# Patient Record
Sex: FEMALE | Race: BLACK OR AFRICAN AMERICAN | NOT HISPANIC OR LATINO | Employment: PART TIME | URBAN - METROPOLITAN AREA
[De-identification: names, ages, dates, MRNs, and addresses within clinical notes are randomized per-mention and may not be internally consistent; named-entity substitution may affect disease eponyms.]

---

## 2023-10-29 RX ORDER — LEVONORGESTREL 52 MG/1
52 INTRAUTERINE DEVICE INTRAUTERINE DAILY
COMMUNITY
Start: 2021-04-21 | End: 2025-04-21

## 2023-10-31 ENCOUNTER — OFFICE VISIT (OUTPATIENT)
Dept: PRIMARY CARE | Facility: CLINIC | Age: 33
End: 2023-10-31
Payer: COMMERCIAL

## 2023-10-31 VITALS
WEIGHT: 118.6 LBS | OXYGEN SATURATION: 99 % | DIASTOLIC BLOOD PRESSURE: 82 MMHG | TEMPERATURE: 96.7 F | SYSTOLIC BLOOD PRESSURE: 110 MMHG | BODY MASS INDEX: 21.83 KG/M2 | HEART RATE: 100 BPM | HEIGHT: 62 IN

## 2023-10-31 DIAGNOSIS — S61.216D LACERATION OF RIGHT LITTLE FINGER, FOREIGN BODY PRESENCE UNSPECIFIED, NAIL DAMAGE STATUS UNSPECIFIED, SUBSEQUENT ENCOUNTER: Primary | ICD-10-CM

## 2023-10-31 DIAGNOSIS — Z23 ENCOUNTER FOR IMMUNIZATION: ICD-10-CM

## 2023-10-31 DIAGNOSIS — R10.84 GENERALIZED ABDOMINAL PAIN: ICD-10-CM

## 2023-10-31 PROCEDURE — 99213 OFFICE O/P EST LOW 20 MIN: CPT | Mod: 25 | Performed by: STUDENT IN AN ORGANIZED HEALTH CARE EDUCATION/TRAINING PROGRAM

## 2023-10-31 PROCEDURE — 90686 IIV4 VACC NO PRSV 0.5 ML IM: CPT | Performed by: STUDENT IN AN ORGANIZED HEALTH CARE EDUCATION/TRAINING PROGRAM

## 2023-10-31 PROCEDURE — 99203 OFFICE O/P NEW LOW 30 MIN: CPT | Performed by: STUDENT IN AN ORGANIZED HEALTH CARE EDUCATION/TRAINING PROGRAM

## 2023-10-31 RX ORDER — PANTOPRAZOLE SODIUM 40 MG/1
1 TABLET, DELAYED RELEASE ORAL
COMMUNITY
Start: 2023-10-12 | End: 2024-01-10

## 2023-10-31 ASSESSMENT — PAIN SCALES - GENERAL: PAINLEVEL: 5

## 2023-10-31 NOTE — PATIENT INSTRUCTIONS
Thank you for coming to see me today.    Flu shot in clinic today.    Continue instructions per gastroenterologist as recommended during your recent telemetry visit.  Follow-up for in person evaluation and consider fusion of EGD as we discussed.    Ortho hand referral entered today, call to schedule.

## 2023-10-31 NOTE — PROGRESS NOTES
Pt stated she has had upper abdominal pain and irregular bowel movements.pt stated she has constipation and nausea stated her urine is darker in color. Pt stated she had an appointment with GI but they didn't do much to help.    Pt stated that on May 29 th 2023 her right pinky finger was cut at work was doing workers comp but not anymore but the pt is still experiencing impairment, pain, and mobility issues with finger because of this work injury. Pt stated she also has numbness, stiffness and cramping in pinky finger since injury.

## 2023-10-31 NOTE — PROGRESS NOTES
"Subjective   William Moya is a 33 y.o. female who presents for No chief complaint on file..    HPI:      Here presenting as new patient to discuss ongoing finger pain and abdominal sx.    Finger Injury:  Previously workers comp, case now closed.  Has not seen ortho/hand specialist for this issue  Recommended that she see plastic surgery, has not yet scheduled.  Ongoing limited ROM following laceration/injury.    Abdominal Pain  Recent televisit with GI.  Given dietary/lifestyle changes.  Has not yet noticed any improvement.  .    Immunization History   Administered Date(s) Administered    Flu vaccine (IIV4), preservative free *Check age/dose* 10/31/2023    HPV, Quadrivalent 05/13/2008    Hepatitis A vaccine, pediatric/adolescent (HAVRIX, VAQTA) 05/13/2008    Influenza, injectable, quadrivalent 10/28/2021    Meningococcal MCV4P 04/05/2007    Moderna SARS-CoV-2 Vaccination 01/23/2022, 02/20/2022    Tdap vaccine, age 7 year and older (BOOSTRIX) 07/16/2015, 05/30/2023    Varicella vaccine, subcutaneous (VARIVAX) 02/06/2007, 04/05/2007, 05/13/2008         Seen by televisit GI on         ROS:    Review of systems is essentially negative for all systems except for any identified issues in HPI above.    Objective     /82   Pulse 100   Temp 35.9 °C (96.7 °F)   Ht 1.575 m (5' 2\")   Wt 53.8 kg (118 lb 9.6 oz)   SpO2 99%   BMI 21.69 kg/m²      PHYSICAL EXAM    GENERAL  Well-appearing, pleasant and cooperative.  No acute distress.    HEENT  HEAD:   Normocephalic.  Atraumatic.  EYES:  PERRLA.  No scleral icterus or conjunctival injection.  EARS:  Tympanic membranes visualized bilaterally without erythema, fluid, or bulging.  NECK:  No adenopathy.  No palpable thyroid enlargement or nodules.    THROAT:  Moist oropharynx without tonsillar enlargement or exudates.    LUNGS:    Clear to auscultation bilaterally.  No wheezes, rales, rhonchi.    CARDIAC:  Regular rate and rhythm.  Normal S1S2.  No " murmurs/rubs/gallops.    ABDOMEN:  Soft, non-tender, non-distended.  No hepatosplenomegaly.  Normoactive bowel sounds.    MUSCULOSKELETAL:  No gross abnormalities.   No joint swelling or erythema,.  No spinal or paraspinal tenderness to palpation.    EXTREMITIES:  Well healed scar on lateral R 5th digit.  4/5  strength on R side.  No LE edema or cyanosis.      NEURO           Alert and oriented x3. No focal deficits.    PSYCH:          Affect appropriate.           Assessment/Plan   Problem List Items Addressed This Visit    None  Visit Diagnoses       Laceration of right little finger, foreign body presence unspecified, nail damage status unspecified, subsequent encounter    -  Primary    Relevant Orders    Referral to Orthopaedic Surgery    Encounter for immunization        Relevant Orders    Referral to Orthopaedic Surgery    Generalized abdominal pain        Instructed to continue with GI recommendations.  Reassured that lifestyle changes may take several weeks to show full impact, F/u with GI if not improving.                 Corinne Hooker MD

## 2023-11-19 ENCOUNTER — APPOINTMENT (OUTPATIENT)
Dept: RADIOLOGY | Facility: HOSPITAL | Age: 33
End: 2023-11-19
Payer: COMMERCIAL

## 2023-11-19 ENCOUNTER — ANESTHESIA EVENT (OUTPATIENT)
Dept: OPERATING ROOM | Facility: HOSPITAL | Age: 33
End: 2023-11-19
Payer: COMMERCIAL

## 2023-11-19 ENCOUNTER — ANESTHESIA (OUTPATIENT)
Dept: OPERATING ROOM | Facility: HOSPITAL | Age: 33
End: 2023-11-19
Payer: COMMERCIAL

## 2023-11-19 ENCOUNTER — HOSPITAL ENCOUNTER (INPATIENT)
Facility: HOSPITAL | Age: 33
LOS: 1 days | Discharge: HOME | End: 2023-11-20
Attending: EMERGENCY MEDICINE | Admitting: ORTHOPAEDIC SURGERY
Payer: COMMERCIAL

## 2023-11-19 DIAGNOSIS — S82.401B TIBIA/FIBULA FRACTURE, RIGHT, OPEN TYPE I OR II, INITIAL ENCOUNTER: Primary | ICD-10-CM

## 2023-11-19 DIAGNOSIS — S82.201B TIBIA/FIBULA FRACTURE, RIGHT, OPEN TYPE I OR II, INITIAL ENCOUNTER: Primary | ICD-10-CM

## 2023-11-19 DIAGNOSIS — S82.201S TIBIA AND FIBULA OPEN FRACTURE, RIGHT, SEQUELA: ICD-10-CM

## 2023-11-19 DIAGNOSIS — G89.18 ACUTE POST-OPERATIVE PAIN: ICD-10-CM

## 2023-11-19 DIAGNOSIS — S82.401S TIBIA AND FIBULA OPEN FRACTURE, RIGHT, SEQUELA: ICD-10-CM

## 2023-11-19 LAB
ABO GROUP (TYPE) IN BLOOD: NORMAL
ANION GAP SERPL CALC-SCNC: 16 MMOL/L (ref 10–20)
ANTIBODY SCREEN: NORMAL
B-HCG SERPL-ACNC: <3 MIU/ML
BASOPHILS # BLD AUTO: 0.02 X10*3/UL (ref 0–0.1)
BASOPHILS NFR BLD AUTO: 0.2 %
BUN SERPL-MCNC: 10 MG/DL (ref 6–23)
CALCIUM SERPL-MCNC: 8.1 MG/DL (ref 8.6–10.6)
CHLORIDE SERPL-SCNC: 108 MMOL/L (ref 98–107)
CO2 SERPL-SCNC: 21 MMOL/L (ref 21–32)
CREAT SERPL-MCNC: 0.59 MG/DL (ref 0.5–1.05)
EOSINOPHIL # BLD AUTO: 0 X10*3/UL (ref 0–0.7)
EOSINOPHIL NFR BLD AUTO: 0 %
ERYTHROCYTE [DISTWIDTH] IN BLOOD BY AUTOMATED COUNT: 12 % (ref 11.5–14.5)
ETHANOL SERPL-MCNC: 94 MG/DL
GFR SERPL CREATININE-BSD FRML MDRD: >90 ML/MIN/1.73M*2
GLUCOSE SERPL-MCNC: 92 MG/DL (ref 74–99)
HCT VFR BLD AUTO: 36.4 % (ref 36–46)
HGB BLD-MCNC: 12.3 G/DL (ref 12–16)
IMM GRANULOCYTES # BLD AUTO: 0.03 X10*3/UL (ref 0–0.7)
IMM GRANULOCYTES NFR BLD AUTO: 0.3 % (ref 0–0.9)
LYMPHOCYTES # BLD AUTO: 1.39 X10*3/UL (ref 1.2–4.8)
LYMPHOCYTES NFR BLD AUTO: 11.7 %
MCH RBC QN AUTO: 33.7 PG (ref 26–34)
MCHC RBC AUTO-ENTMCNC: 33.8 G/DL (ref 32–36)
MCV RBC AUTO: 100 FL (ref 80–100)
MONOCYTES # BLD AUTO: 0.81 X10*3/UL (ref 0.1–1)
MONOCYTES NFR BLD AUTO: 6.8 %
NEUTROPHILS # BLD AUTO: 9.58 X10*3/UL (ref 1.2–7.7)
NEUTROPHILS NFR BLD AUTO: 81 %
NRBC BLD-RTO: 0 /100 WBCS (ref 0–0)
PLATELET # BLD AUTO: 305 X10*3/UL (ref 150–450)
POTASSIUM SERPL-SCNC: 3.3 MMOL/L (ref 3.5–5.3)
RBC # BLD AUTO: 3.65 X10*6/UL (ref 4–5.2)
RH FACTOR (ANTIGEN D): NORMAL
SODIUM SERPL-SCNC: 142 MMOL/L (ref 136–145)
WBC # BLD AUTO: 11.8 X10*3/UL (ref 4.4–11.3)

## 2023-11-19 PROCEDURE — 73600 X-RAY EXAM OF ANKLE: CPT | Mod: RT,FY

## 2023-11-19 PROCEDURE — 2500000005 HC RX 250 GENERAL PHARMACY W/O HCPCS: Mod: SE

## 2023-11-19 PROCEDURE — 11012 DEB SKIN BONE AT FX SITE: CPT

## 2023-11-19 PROCEDURE — 2500000004 HC RX 250 GENERAL PHARMACY W/ HCPCS (ALT 636 FOR OP/ED): Performed by: STUDENT IN AN ORGANIZED HEALTH CARE EDUCATION/TRAINING PROGRAM

## 2023-11-19 PROCEDURE — 99285 EMERGENCY DEPT VISIT HI MDM: CPT | Mod: 25 | Performed by: EMERGENCY MEDICINE

## 2023-11-19 PROCEDURE — 73620 X-RAY EXAM OF FOOT: CPT | Mod: RIGHT SIDE | Performed by: RADIOLOGY

## 2023-11-19 PROCEDURE — 2720000007 HC OR 272 NO HCPCS: Performed by: ORTHOPAEDIC SURGERY

## 2023-11-19 PROCEDURE — 2500000002 HC RX 250 W HCPCS SELF ADMINISTERED DRUGS (ALT 637 FOR MEDICARE OP, ALT 636 FOR OP/ED): Mod: SE

## 2023-11-19 PROCEDURE — 99223 1ST HOSP IP/OBS HIGH 75: CPT

## 2023-11-19 PROCEDURE — 73562 X-RAY EXAM OF KNEE 3: CPT | Mod: RIGHT SIDE | Performed by: RADIOLOGY

## 2023-11-19 PROCEDURE — 82374 ASSAY BLOOD CARBON DIOXIDE: CPT

## 2023-11-19 PROCEDURE — 73562 X-RAY EXAM OF KNEE 3: CPT | Mod: RT,FY

## 2023-11-19 PROCEDURE — 73700 CT LOWER EXTREMITY W/O DYE: CPT | Mod: RIGHT SIDE | Performed by: RADIOLOGY

## 2023-11-19 PROCEDURE — 0QSG06Z REPOSITION RIGHT TIBIA WITH INTRAMEDULLARY INTERNAL FIXATION DEVICE, OPEN APPROACH: ICD-10-PCS | Performed by: ORTHOPAEDIC SURGERY

## 2023-11-19 PROCEDURE — 3700000001 HC GENERAL ANESTHESIA TIME - INITIAL BASE CHARGE: Performed by: ORTHOPAEDIC SURGERY

## 2023-11-19 PROCEDURE — 77071 MNL APPL STRS JT RADIOGRAPHY: CPT

## 2023-11-19 PROCEDURE — A27759 PR TREAT TIBIAL SHAFT FX, INTRAMED IMPLANT

## 2023-11-19 PROCEDURE — 85025 COMPLETE CBC W/AUTO DIFF WBC: CPT

## 2023-11-19 PROCEDURE — 80048 BASIC METABOLIC PNL TOTAL CA: CPT

## 2023-11-19 PROCEDURE — 2500000004 HC RX 250 GENERAL PHARMACY W/ HCPCS (ALT 636 FOR OP/ED): Mod: SE | Performed by: ORTHOPAEDIC SURGERY

## 2023-11-19 PROCEDURE — 36415 COLL VENOUS BLD VENIPUNCTURE: CPT

## 2023-11-19 PROCEDURE — 2500000004 HC RX 250 GENERAL PHARMACY W/ HCPCS (ALT 636 FOR OP/ED): Mod: SE | Performed by: ANESTHESIOLOGY

## 2023-11-19 PROCEDURE — 76377 3D RENDER W/INTRP POSTPROCES: CPT | Mod: RIGHT SIDE | Performed by: RADIOLOGY

## 2023-11-19 PROCEDURE — C1734 ORTH/DEVIC/DRUG BN/BN,TIS/BN: HCPCS | Performed by: ORTHOPAEDIC SURGERY

## 2023-11-19 PROCEDURE — 76000 FLUOROSCOPY <1 HR PHYS/QHP: CPT

## 2023-11-19 PROCEDURE — 73620 X-RAY EXAM OF FOOT: CPT | Mod: RT,FY

## 2023-11-19 PROCEDURE — 3700000002 HC GENERAL ANESTHESIA TIME - EACH INCREMENTAL 1 MINUTE: Performed by: ORTHOPAEDIC SURGERY

## 2023-11-19 PROCEDURE — 82077 ASSAY SPEC XCP UR&BREATH IA: CPT

## 2023-11-19 PROCEDURE — 86850 RBC ANTIBODY SCREEN: CPT

## 2023-11-19 PROCEDURE — 0QBH0ZZ EXCISION OF LEFT TIBIA, OPEN APPROACH: ICD-10-PCS | Performed by: ORTHOPAEDIC SURGERY

## 2023-11-19 PROCEDURE — C1713 ANCHOR/SCREW BN/BN,TIS/BN: HCPCS | Performed by: ORTHOPAEDIC SURGERY

## 2023-11-19 PROCEDURE — 27759 TREATMENT OF TIBIA FRACTURE: CPT

## 2023-11-19 PROCEDURE — 7100000002 HC RECOVERY ROOM TIME - EACH INCREMENTAL 1 MINUTE: Performed by: ORTHOPAEDIC SURGERY

## 2023-11-19 PROCEDURE — 3600000009 HC OR TIME - EACH INCREMENTAL 1 MINUTE - PROCEDURE LEVEL FOUR: Performed by: ORTHOPAEDIC SURGERY

## 2023-11-19 PROCEDURE — 71045 X-RAY EXAM CHEST 1 VIEW: CPT | Mod: FY

## 2023-11-19 PROCEDURE — 27827 TREAT LOWER LEG FRACTURE: CPT

## 2023-11-19 PROCEDURE — 2500000004 HC RX 250 GENERAL PHARMACY W/ HCPCS (ALT 636 FOR OP/ED): Mod: SE | Performed by: EMERGENCY MEDICINE

## 2023-11-19 PROCEDURE — 2500000004 HC RX 250 GENERAL PHARMACY W/ HCPCS (ALT 636 FOR OP/ED): Mod: SE

## 2023-11-19 PROCEDURE — 73590 X-RAY EXAM OF LOWER LEG: CPT | Mod: RIGHT SIDE | Performed by: RADIOLOGY

## 2023-11-19 PROCEDURE — A4217 STERILE WATER/SALINE, 500 ML: HCPCS | Mod: SE | Performed by: ORTHOPAEDIC SURGERY

## 2023-11-19 PROCEDURE — 84702 CHORIONIC GONADOTROPIN TEST: CPT

## 2023-11-19 PROCEDURE — 2500000001 HC RX 250 WO HCPCS SELF ADMINISTERED DRUGS (ALT 637 FOR MEDICARE OP): Performed by: STUDENT IN AN ORGANIZED HEALTH CARE EDUCATION/TRAINING PROGRAM

## 2023-11-19 PROCEDURE — 3600000004 HC OR TIME - INITIAL BASE CHARGE - PROCEDURE LEVEL FOUR: Performed by: ORTHOPAEDIC SURGERY

## 2023-11-19 PROCEDURE — 73700 CT LOWER EXTREMITY W/O DYE: CPT | Mod: RT

## 2023-11-19 PROCEDURE — 73590 X-RAY EXAM OF LOWER LEG: CPT | Mod: RT,FY

## 2023-11-19 PROCEDURE — 2780000003 HC OR 278 NO HCPCS: Performed by: ORTHOPAEDIC SURGERY

## 2023-11-19 PROCEDURE — 2500000001 HC RX 250 WO HCPCS SELF ADMINISTERED DRUGS (ALT 637 FOR MEDICARE OP): Mod: SE | Performed by: ANESTHESIOLOGY

## 2023-11-19 PROCEDURE — 93010 ELECTROCARDIOGRAM REPORT: CPT | Performed by: NURSE PRACTITIONER

## 2023-11-19 PROCEDURE — 73600 X-RAY EXAM OF ANKLE: CPT | Mod: RIGHT SIDE | Performed by: RADIOLOGY

## 2023-11-19 PROCEDURE — 99285 EMERGENCY DEPT VISIT HI MDM: CPT | Performed by: EMERGENCY MEDICINE

## 2023-11-19 PROCEDURE — A27759 PR TREAT TIBIAL SHAFT FX, INTRAMED IMPLANT: Performed by: ANESTHESIOLOGY

## 2023-11-19 PROCEDURE — 1100000001 HC PRIVATE ROOM DAILY

## 2023-11-19 PROCEDURE — 71045 X-RAY EXAM CHEST 1 VIEW: CPT | Performed by: RADIOLOGY

## 2023-11-19 PROCEDURE — 7100000001 HC RECOVERY ROOM TIME - INITIAL BASE CHARGE: Performed by: ORTHOPAEDIC SURGERY

## 2023-11-19 DEVICE — IMPLANTABLE DEVICE: Type: IMPLANTABLE DEVICE | Site: TIBIA | Status: FUNCTIONAL

## 2023-11-19 DEVICE — SCREW, LOCKING, 5 X 45MM: Type: IMPLANTABLE DEVICE | Site: TIBIA | Status: FUNCTIONAL

## 2023-11-19 DEVICE — KIT, AUGMENT INJECTABLE 3CC: Type: IMPLANTABLE DEVICE | Site: TIBIA | Status: FUNCTIONAL

## 2023-11-19 DEVICE — SCREW, LOCKING, 5 X 35MM: Type: IMPLANTABLE DEVICE | Site: TIBIA | Status: FUNCTIONAL

## 2023-11-19 DEVICE — SCREW, BONE, T8 FULL THREAD, 2.7 X36 MM: Type: IMPLANTABLE DEVICE | Site: TIBIA | Status: FUNCTIONAL

## 2023-11-19 DEVICE — SCREW, LOCKING, 5 X 40MM: Type: IMPLANTABLE DEVICE | Site: TIBIA | Status: FUNCTIONAL

## 2023-11-19 DEVICE — SCREW, BONE, T8 FULL THREAD, 2.7 X 34 MM: Type: IMPLANTABLE DEVICE | Site: TIBIA | Status: FUNCTIONAL

## 2023-11-19 DEVICE — GUIDE WIRE, GAM, 3.0MM X 1000MM: Type: IMPLANTABLE DEVICE | Site: TIBIA | Status: NON-FUNCTIONAL

## 2023-11-19 DEVICE — IMPLANTABLE DEVICE: Type: IMPLANTABLE DEVICE | Site: TIBIA | Status: NON-FUNCTIONAL

## 2023-11-19 RX ORDER — METOCLOPRAMIDE HYDROCHLORIDE 5 MG/ML
INJECTION INTRAMUSCULAR; INTRAVENOUS AS NEEDED
Status: DISCONTINUED | OUTPATIENT
Start: 2023-11-19 | End: 2023-11-19

## 2023-11-19 RX ORDER — OXYCODONE HYDROCHLORIDE 5 MG/1
5 TABLET ORAL EVERY 6 HOURS PRN
Qty: 28 TABLET | Refills: 0 | Status: SHIPPED | OUTPATIENT
Start: 2023-11-19 | End: 2023-11-27

## 2023-11-19 RX ORDER — HYDROMORPHONE HYDROCHLORIDE 1 MG/ML
0.2 INJECTION, SOLUTION INTRAMUSCULAR; INTRAVENOUS; SUBCUTANEOUS EVERY 5 MIN PRN
Status: DISCONTINUED | OUTPATIENT
Start: 2023-11-19 | End: 2023-11-19 | Stop reason: HOSPADM

## 2023-11-19 RX ORDER — ROCURONIUM BROMIDE 10 MG/ML
INJECTION, SOLUTION INTRAVENOUS AS NEEDED
Status: DISCONTINUED | OUTPATIENT
Start: 2023-11-19 | End: 2023-11-19

## 2023-11-19 RX ORDER — MAGNESIUM HYDROXIDE 2400 MG/10ML
10 SUSPENSION ORAL DAILY PRN
Status: DISCONTINUED | OUTPATIENT
Start: 2023-11-19 | End: 2023-11-19

## 2023-11-19 RX ORDER — OXYCODONE AND ACETAMINOPHEN 10; 325 MG/1; MG/1
1 TABLET ORAL EVERY 4 HOURS PRN
Status: DISCONTINUED | OUTPATIENT
Start: 2023-11-19 | End: 2023-11-19

## 2023-11-19 RX ORDER — DEXAMETHASONE SODIUM PHOSPHATE 4 MG/ML
INJECTION, SOLUTION INTRA-ARTICULAR; INTRALESIONAL; INTRAMUSCULAR; INTRAVENOUS; SOFT TISSUE AS NEEDED
Status: DISCONTINUED | OUTPATIENT
Start: 2023-11-19 | End: 2023-11-19

## 2023-11-19 RX ORDER — HYDROMORPHONE HYDROCHLORIDE 1 MG/ML
0.5 INJECTION, SOLUTION INTRAMUSCULAR; INTRAVENOUS; SUBCUTANEOUS EVERY 5 MIN PRN
Status: DISCONTINUED | OUTPATIENT
Start: 2023-11-19 | End: 2023-11-19 | Stop reason: HOSPADM

## 2023-11-19 RX ORDER — OXYCODONE HYDROCHLORIDE 5 MG/1
10 TABLET ORAL EVERY 4 HOURS PRN
Status: DISCONTINUED | OUTPATIENT
Start: 2023-11-19 | End: 2023-11-19

## 2023-11-19 RX ORDER — SIMETHICONE 80 MG
1 TABLET,CHEWABLE ORAL EVERY 6 HOURS PRN
COMMUNITY
Start: 2023-10-12 | End: 2024-02-09

## 2023-11-19 RX ORDER — SCOLOPAMINE TRANSDERMAL SYSTEM 1 MG/1
PATCH, EXTENDED RELEASE TRANSDERMAL AS NEEDED
Status: DISCONTINUED | OUTPATIENT
Start: 2023-11-19 | End: 2023-11-19

## 2023-11-19 RX ORDER — OXYCODONE HYDROCHLORIDE 5 MG/1
10 TABLET ORAL EVERY 4 HOURS PRN
Status: DISCONTINUED | OUTPATIENT
Start: 2023-11-19 | End: 2023-11-19 | Stop reason: HOSPADM

## 2023-11-19 RX ORDER — PROMETHAZINE HYDROCHLORIDE 25 MG/1
25 SUPPOSITORY RECTAL EVERY 12 HOURS PRN
Status: DISCONTINUED | OUTPATIENT
Start: 2023-11-19 | End: 2023-11-19

## 2023-11-19 RX ORDER — CEFAZOLIN SODIUM 1 G/50ML
1 SOLUTION INTRAVENOUS ONCE
Status: CANCELLED | OUTPATIENT
Start: 2023-11-19 | End: 2023-11-19

## 2023-11-19 RX ORDER — LORATADINE 10 MG/1
1 TABLET ORAL DAILY
COMMUNITY
Start: 2018-12-04

## 2023-11-19 RX ORDER — HYDRALAZINE HYDROCHLORIDE 20 MG/ML
5 INJECTION INTRAMUSCULAR; INTRAVENOUS EVERY 30 MIN PRN
Status: DISCONTINUED | OUTPATIENT
Start: 2023-11-19 | End: 2023-11-19 | Stop reason: HOSPADM

## 2023-11-19 RX ORDER — ONDANSETRON HYDROCHLORIDE 2 MG/ML
4 INJECTION, SOLUTION INTRAVENOUS EVERY 8 HOURS PRN
Status: DISCONTINUED | OUTPATIENT
Start: 2023-11-19 | End: 2023-11-19

## 2023-11-19 RX ORDER — OXYCODONE HYDROCHLORIDE 5 MG/1
5 TABLET ORAL EVERY 6 HOURS PRN
Status: DISCONTINUED | OUTPATIENT
Start: 2023-11-19 | End: 2023-11-20

## 2023-11-19 RX ORDER — PANTOPRAZOLE SODIUM 40 MG/1
40 TABLET, DELAYED RELEASE ORAL
Status: DISCONTINUED | OUTPATIENT
Start: 2023-11-19 | End: 2023-11-19

## 2023-11-19 RX ORDER — CEFAZOLIN SODIUM 2 G/100ML
2 INJECTION, SOLUTION INTRAVENOUS EVERY 8 HOURS
Status: DISCONTINUED | OUTPATIENT
Start: 2023-11-19 | End: 2023-11-19

## 2023-11-19 RX ORDER — OXYCODONE HYDROCHLORIDE 5 MG/1
2.5 TABLET ORAL EVERY 4 HOURS PRN
Status: DISCONTINUED | OUTPATIENT
Start: 2023-11-19 | End: 2023-11-19

## 2023-11-19 RX ORDER — ALBUTEROL SULFATE 0.83 MG/ML
2.5 SOLUTION RESPIRATORY (INHALATION) ONCE AS NEEDED
Status: DISCONTINUED | OUTPATIENT
Start: 2023-11-19 | End: 2023-11-19 | Stop reason: HOSPADM

## 2023-11-19 RX ORDER — APREPITANT 40 MG/1
CAPSULE ORAL AS NEEDED
Status: DISCONTINUED | OUTPATIENT
Start: 2023-11-19 | End: 2023-11-19

## 2023-11-19 RX ORDER — CEFAZOLIN SODIUM 2 G/100ML
2 INJECTION, SOLUTION INTRAVENOUS EVERY 8 HOURS
Status: COMPLETED | OUTPATIENT
Start: 2023-11-19 | End: 2023-11-20

## 2023-11-19 RX ORDER — NALOXONE HYDROCHLORIDE 0.4 MG/ML
0.2 INJECTION, SOLUTION INTRAMUSCULAR; INTRAVENOUS; SUBCUTANEOUS EVERY 5 MIN PRN
Status: DISCONTINUED | OUTPATIENT
Start: 2023-11-19 | End: 2023-11-19

## 2023-11-19 RX ORDER — ASPIRIN 81 MG/1
81 TABLET ORAL 2 TIMES DAILY
Qty: 56 TABLET | Refills: 0 | Status: SHIPPED | OUTPATIENT
Start: 2023-11-19 | End: 2023-12-18

## 2023-11-19 RX ORDER — LIDOCAINE HYDROCHLORIDE 10 MG/ML
0.1 INJECTION INFILTRATION; PERINEURAL ONCE
Status: DISCONTINUED | OUTPATIENT
Start: 2023-11-19 | End: 2023-11-19 | Stop reason: HOSPADM

## 2023-11-19 RX ORDER — SODIUM CHLORIDE 0.9 G/100ML
IRRIGANT IRRIGATION AS NEEDED
Status: DISCONTINUED | OUTPATIENT
Start: 2023-11-19 | End: 2023-11-19 | Stop reason: HOSPADM

## 2023-11-19 RX ORDER — ASPIRIN 81 MG/1
81 TABLET ORAL 2 TIMES DAILY
Status: DISCONTINUED | OUTPATIENT
Start: 2023-11-19 | End: 2023-11-20 | Stop reason: HOSPADM

## 2023-11-19 RX ORDER — MIDAZOLAM HYDROCHLORIDE 1 MG/ML
INJECTION, SOLUTION INTRAMUSCULAR; INTRAVENOUS AS NEEDED
Status: DISCONTINUED | OUTPATIENT
Start: 2023-11-19 | End: 2023-11-19

## 2023-11-19 RX ORDER — CYCLOBENZAPRINE HCL 10 MG
10 TABLET ORAL 3 TIMES DAILY PRN
Status: DISCONTINUED | OUTPATIENT
Start: 2023-11-19 | End: 2023-11-19

## 2023-11-19 RX ORDER — CEFAZOLIN 1 G/1
INJECTION, POWDER, FOR SOLUTION INTRAVENOUS AS NEEDED
Status: DISCONTINUED | OUTPATIENT
Start: 2023-11-19 | End: 2023-11-19

## 2023-11-19 RX ORDER — HYDROMORPHONE HYDROCHLORIDE 1 MG/ML
0.5 INJECTION, SOLUTION INTRAMUSCULAR; INTRAVENOUS; SUBCUTANEOUS EVERY 2 HOUR PRN
Status: DISCONTINUED | OUTPATIENT
Start: 2023-11-19 | End: 2023-11-19

## 2023-11-19 RX ORDER — ONDANSETRON HYDROCHLORIDE 2 MG/ML
INJECTION, SOLUTION INTRAVENOUS AS NEEDED
Status: DISCONTINUED | OUTPATIENT
Start: 2023-11-19 | End: 2023-11-19

## 2023-11-19 RX ORDER — AMOXICILLIN 250 MG
2 CAPSULE ORAL 2 TIMES DAILY
Status: DISCONTINUED | OUTPATIENT
Start: 2023-11-19 | End: 2023-11-19

## 2023-11-19 RX ORDER — HYDROMORPHONE HYDROCHLORIDE 1 MG/ML
INJECTION, SOLUTION INTRAMUSCULAR; INTRAVENOUS; SUBCUTANEOUS AS NEEDED
Status: DISCONTINUED | OUTPATIENT
Start: 2023-11-19 | End: 2023-11-19

## 2023-11-19 RX ORDER — FENTANYL CITRATE 50 UG/ML
50 INJECTION, SOLUTION INTRAMUSCULAR; INTRAVENOUS ONCE
Status: COMPLETED | OUTPATIENT
Start: 2023-11-19 | End: 2023-11-19

## 2023-11-19 RX ORDER — DOCUSATE SODIUM 100 MG/1
100 CAPSULE, LIQUID FILLED ORAL 2 TIMES DAILY
Status: DISCONTINUED | OUTPATIENT
Start: 2023-11-19 | End: 2023-11-20 | Stop reason: HOSPADM

## 2023-11-19 RX ORDER — METHOCARBAMOL 100 MG/ML
1000 INJECTION, SOLUTION INTRAMUSCULAR; INTRAVENOUS ONCE
Status: DISCONTINUED | OUTPATIENT
Start: 2023-11-19 | End: 2023-11-19 | Stop reason: HOSPADM

## 2023-11-19 RX ORDER — ONDANSETRON HYDROCHLORIDE 2 MG/ML
4 INJECTION, SOLUTION INTRAVENOUS EVERY 8 HOURS PRN
Status: DISCONTINUED | OUTPATIENT
Start: 2023-11-19 | End: 2023-11-20 | Stop reason: HOSPADM

## 2023-11-19 RX ORDER — ACETAMINOPHEN 325 MG/1
650 TABLET ORAL ONCE
Status: COMPLETED | OUTPATIENT
Start: 2023-11-19 | End: 2023-11-19

## 2023-11-19 RX ORDER — MULTIVITAMIN
1 TABLET ORAL 2 TIMES DAILY
Qty: 60 TABLET | Refills: 0 | Status: SHIPPED | OUTPATIENT
Start: 2023-11-19 | End: 2023-12-20

## 2023-11-19 RX ORDER — HYDROMORPHONE HYDROCHLORIDE 1 MG/ML
1 INJECTION, SOLUTION INTRAMUSCULAR; INTRAVENOUS; SUBCUTANEOUS ONCE
Status: COMPLETED | OUTPATIENT
Start: 2023-11-19 | End: 2023-11-19

## 2023-11-19 RX ORDER — HYDROMORPHONE HYDROCHLORIDE 2 MG/ML
0.4 INJECTION, SOLUTION INTRAMUSCULAR; INTRAVENOUS; SUBCUTANEOUS ONCE
Status: CANCELLED | OUTPATIENT
Start: 2023-11-19

## 2023-11-19 RX ORDER — BISACODYL 10 MG/1
10 SUPPOSITORY RECTAL DAILY PRN
Status: DISCONTINUED | OUTPATIENT
Start: 2023-11-19 | End: 2023-11-19

## 2023-11-19 RX ORDER — ONDANSETRON 4 MG/1
4 TABLET, FILM COATED ORAL EVERY 8 HOURS PRN
Status: DISCONTINUED | OUTPATIENT
Start: 2023-11-19 | End: 2023-11-20 | Stop reason: HOSPADM

## 2023-11-19 RX ORDER — NALOXONE HYDROCHLORIDE 0.4 MG/ML
0.2 INJECTION, SOLUTION INTRAMUSCULAR; INTRAVENOUS; SUBCUTANEOUS EVERY 5 MIN PRN
Status: DISCONTINUED | OUTPATIENT
Start: 2023-11-19 | End: 2023-11-19 | Stop reason: SDUPTHER

## 2023-11-19 RX ORDER — CEFAZOLIN SODIUM 1 G/50ML
1 SOLUTION INTRAVENOUS ONCE
Status: COMPLETED | OUTPATIENT
Start: 2023-11-19 | End: 2023-11-19

## 2023-11-19 RX ORDER — LABETALOL HYDROCHLORIDE 5 MG/ML
5 INJECTION, SOLUTION INTRAVENOUS ONCE AS NEEDED
Status: DISCONTINUED | OUTPATIENT
Start: 2023-11-19 | End: 2023-11-19 | Stop reason: HOSPADM

## 2023-11-19 RX ORDER — GABAPENTIN 300 MG/1
300 TABLET, FILM COATED ORAL ONCE
Status: DISCONTINUED | OUTPATIENT
Start: 2023-11-19 | End: 2023-11-19

## 2023-11-19 RX ORDER — DIPHENHYDRAMINE HYDROCHLORIDE 50 MG/ML
12.5 INJECTION INTRAMUSCULAR; INTRAVENOUS ONCE AS NEEDED
Status: DISCONTINUED | OUTPATIENT
Start: 2023-11-19 | End: 2023-11-19 | Stop reason: HOSPADM

## 2023-11-19 RX ORDER — ACETAMINOPHEN 325 MG/1
650 TABLET ORAL EVERY 4 HOURS PRN
Status: DISCONTINUED | OUTPATIENT
Start: 2023-11-19 | End: 2023-11-19 | Stop reason: HOSPADM

## 2023-11-19 RX ORDER — CELECOXIB 200 MG/1
200 CAPSULE ORAL ONCE
Status: COMPLETED | OUTPATIENT
Start: 2023-11-19 | End: 2023-11-19

## 2023-11-19 RX ORDER — DIPHENHYDRAMINE HCL 12.5MG/5ML
12.5 LIQUID (ML) ORAL EVERY 6 HOURS PRN
Status: DISCONTINUED | OUTPATIENT
Start: 2023-11-19 | End: 2023-11-20 | Stop reason: HOSPADM

## 2023-11-19 RX ORDER — PROMETHAZINE HYDROCHLORIDE 25 MG/1
25 TABLET ORAL EVERY 6 HOURS PRN
Status: DISCONTINUED | OUTPATIENT
Start: 2023-11-19 | End: 2023-11-19

## 2023-11-19 RX ORDER — OXYCODONE HYDROCHLORIDE 5 MG/1
5 TABLET ORAL EVERY 6 HOURS PRN
Status: DISCONTINUED | OUTPATIENT
Start: 2023-11-19 | End: 2023-11-19

## 2023-11-19 RX ORDER — ONDANSETRON 4 MG/1
4 TABLET, ORALLY DISINTEGRATING ORAL EVERY 8 HOURS PRN
Status: DISCONTINUED | OUTPATIENT
Start: 2023-11-19 | End: 2023-11-19

## 2023-11-19 RX ORDER — VANCOMYCIN HYDROCHLORIDE 1 G/20ML
INJECTION, POWDER, LYOPHILIZED, FOR SOLUTION INTRAVENOUS AS NEEDED
Status: DISCONTINUED | OUTPATIENT
Start: 2023-11-19 | End: 2023-11-19 | Stop reason: HOSPADM

## 2023-11-19 RX ORDER — ACETAMINOPHEN 325 MG/1
650 TABLET ORAL EVERY 6 HOURS SCHEDULED
Status: DISCONTINUED | OUTPATIENT
Start: 2023-11-19 | End: 2023-11-19

## 2023-11-19 RX ORDER — TRANEXAMIC ACID 100 MG/ML
INJECTION, SOLUTION INTRAVENOUS AS NEEDED
Status: DISCONTINUED | OUTPATIENT
Start: 2023-11-19 | End: 2023-11-19

## 2023-11-19 RX ORDER — TOBRAMYCIN 1.2 G/30ML
INJECTION, POWDER, LYOPHILIZED, FOR SOLUTION INTRAVENOUS AS NEEDED
Status: DISCONTINUED | OUTPATIENT
Start: 2023-11-19 | End: 2023-11-19 | Stop reason: HOSPADM

## 2023-11-19 RX ORDER — SODIUM CHLORIDE, SODIUM LACTATE, POTASSIUM CHLORIDE, CALCIUM CHLORIDE 600; 310; 30; 20 MG/100ML; MG/100ML; MG/100ML; MG/100ML
100 INJECTION, SOLUTION INTRAVENOUS CONTINUOUS
Status: DISCONTINUED | OUTPATIENT
Start: 2023-11-19 | End: 2023-11-19 | Stop reason: HOSPADM

## 2023-11-19 RX ORDER — ACETAMINOPHEN 325 MG/1
650 TABLET ORAL EVERY 6 HOURS SCHEDULED
Status: DISCONTINUED | OUTPATIENT
Start: 2023-11-19 | End: 2023-11-20 | Stop reason: HOSPADM

## 2023-11-19 RX ORDER — OXYCODONE HYDROCHLORIDE 5 MG/1
5 TABLET ORAL EVERY 4 HOURS PRN
Status: DISCONTINUED | OUTPATIENT
Start: 2023-11-19 | End: 2023-11-19 | Stop reason: HOSPADM

## 2023-11-19 RX ORDER — SODIUM CHLORIDE, SODIUM LACTATE, POTASSIUM CHLORIDE, CALCIUM CHLORIDE 600; 310; 30; 20 MG/100ML; MG/100ML; MG/100ML; MG/100ML
125 INJECTION, SOLUTION INTRAVENOUS CONTINUOUS
Status: DISCONTINUED | OUTPATIENT
Start: 2023-11-19 | End: 2023-11-19

## 2023-11-19 RX ORDER — ONDANSETRON HYDROCHLORIDE 2 MG/ML
4 INJECTION, SOLUTION INTRAVENOUS ONCE AS NEEDED
Status: DISCONTINUED | OUTPATIENT
Start: 2023-11-19 | End: 2023-11-19 | Stop reason: HOSPADM

## 2023-11-19 RX ORDER — SODIUM CHLORIDE, SODIUM LACTATE, POTASSIUM CHLORIDE, CALCIUM CHLORIDE 600; 310; 30; 20 MG/100ML; MG/100ML; MG/100ML; MG/100ML
100 INJECTION, SOLUTION INTRAVENOUS CONTINUOUS
Status: ACTIVE | OUTPATIENT
Start: 2023-11-19 | End: 2023-11-20

## 2023-11-19 RX ORDER — PROPOFOL 10 MG/ML
INJECTION, EMULSION INTRAVENOUS AS NEEDED
Status: DISCONTINUED | OUTPATIENT
Start: 2023-11-19 | End: 2023-11-19

## 2023-11-19 RX ORDER — DOCUSATE SODIUM 100 MG/1
100 CAPSULE, LIQUID FILLED ORAL 2 TIMES DAILY PRN
Qty: 14 CAPSULE | Refills: 0 | Status: SHIPPED | OUTPATIENT
Start: 2023-11-19 | End: 2023-11-27

## 2023-11-19 RX ORDER — GABAPENTIN 300 MG/1
300 CAPSULE ORAL ONCE
Status: COMPLETED | OUTPATIENT
Start: 2023-11-19 | End: 2023-11-19

## 2023-11-19 RX ORDER — NALOXONE HYDROCHLORIDE 0.4 MG/ML
0.2 INJECTION, SOLUTION INTRAMUSCULAR; INTRAVENOUS; SUBCUTANEOUS EVERY 5 MIN PRN
Status: DISCONTINUED | OUTPATIENT
Start: 2023-11-19 | End: 2023-11-20 | Stop reason: HOSPADM

## 2023-11-19 RX ORDER — FENTANYL CITRATE 50 UG/ML
INJECTION, SOLUTION INTRAMUSCULAR; INTRAVENOUS
Status: COMPLETED
Start: 2023-11-19 | End: 2023-11-19

## 2023-11-19 RX ORDER — LIDOCAINE HYDROCHLORIDE 20 MG/ML
INJECTION, SOLUTION INFILTRATION; PERINEURAL AS NEEDED
Status: DISCONTINUED | OUTPATIENT
Start: 2023-11-19 | End: 2023-11-19

## 2023-11-19 RX ORDER — FENTANYL CITRATE 50 UG/ML
INJECTION, SOLUTION INTRAMUSCULAR; INTRAVENOUS AS NEEDED
Status: DISCONTINUED | OUTPATIENT
Start: 2023-11-19 | End: 2023-11-19

## 2023-11-19 RX ADMIN — DEXAMETHASONE SODIUM PHOSPHATE 8 MG: 4 INJECTION, SOLUTION INTRA-ARTICULAR; INTRALESIONAL; INTRAMUSCULAR; INTRAVENOUS; SOFT TISSUE at 09:01

## 2023-11-19 RX ADMIN — CEFAZOLIN SODIUM 1 G: 1 INJECTION, SOLUTION INTRAVENOUS at 07:00

## 2023-11-19 RX ADMIN — SODIUM CHLORIDE, SODIUM LACTATE, POTASSIUM CHLORIDE, AND CALCIUM CHLORIDE: 600; 310; 30; 20 INJECTION, SOLUTION INTRAVENOUS at 08:46

## 2023-11-19 RX ADMIN — PROPOFOL 200 MG: 10 INJECTION, EMULSION INTRAVENOUS at 08:47

## 2023-11-19 RX ADMIN — ROCURONIUM BROMIDE 20 MG: 50 INJECTION, SOLUTION INTRAVENOUS at 09:58

## 2023-11-19 RX ADMIN — ACETAMINOPHEN 650 MG: 325 TABLET ORAL at 12:24

## 2023-11-19 RX ADMIN — OXYCODONE HYDROCHLORIDE 5 MG: 5 TABLET ORAL at 21:13

## 2023-11-19 RX ADMIN — OXYCODONE HYDROCHLORIDE 5 MG: 5 TABLET ORAL at 14:47

## 2023-11-19 RX ADMIN — CELECOXIB 200 MG: 200 CAPSULE ORAL at 08:31

## 2023-11-19 RX ADMIN — ROCURONIUM BROMIDE 50 MG: 50 INJECTION, SOLUTION INTRAVENOUS at 08:48

## 2023-11-19 RX ADMIN — LIDOCAINE HYDROCHLORIDE 50 MG: 20 INJECTION, SOLUTION INFILTRATION; PERINEURAL at 08:47

## 2023-11-19 RX ADMIN — SODIUM CHLORIDE, POTASSIUM CHLORIDE, SODIUM LACTATE AND CALCIUM CHLORIDE 100 ML/HR: 600; 310; 30; 20 INJECTION, SOLUTION INTRAVENOUS at 10:40

## 2023-11-19 RX ADMIN — FENTANYL CITRATE 50 MCG: 50 INJECTION INTRAMUSCULAR; INTRAVENOUS at 03:58

## 2023-11-19 RX ADMIN — FENTANYL CITRATE 50 MCG: 50 INJECTION, SOLUTION INTRAMUSCULAR; INTRAVENOUS at 08:30

## 2023-11-19 RX ADMIN — DOCUSATE SODIUM 100 MG: 100 CAPSULE, LIQUID FILLED ORAL at 12:43

## 2023-11-19 RX ADMIN — DOCUSATE SODIUM 100 MG: 100 CAPSULE, LIQUID FILLED ORAL at 21:14

## 2023-11-19 RX ADMIN — CEFAZOLIN SODIUM 2 G: 2 INJECTION, SOLUTION INTRAVENOUS at 16:56

## 2023-11-19 RX ADMIN — TRANEXAMIC ACID 1000 MG: 100 INJECTION INTRAVENOUS at 09:01

## 2023-11-19 RX ADMIN — ASPIRIN 81 MG: 81 TABLET, COATED ORAL at 21:12

## 2023-11-19 RX ADMIN — SCOPALAMINE 1 PATCH: 1 PATCH, EXTENDED RELEASE TRANSDERMAL at 08:30

## 2023-11-19 RX ADMIN — GABAPENTIN 300 MG: 300 CAPSULE ORAL at 08:40

## 2023-11-19 RX ADMIN — MIDAZOLAM 2 MG: 1 INJECTION INTRAMUSCULAR; INTRAVENOUS at 08:30

## 2023-11-19 RX ADMIN — HYDROMORPHONE HYDROCHLORIDE 0.5 MG: 1 INJECTION, SOLUTION INTRAMUSCULAR; INTRAVENOUS; SUBCUTANEOUS at 10:59

## 2023-11-19 RX ADMIN — HYDROMORPHONE HYDROCHLORIDE 0.4 MG: 1 INJECTION, SOLUTION INTRAMUSCULAR; INTRAVENOUS; SUBCUTANEOUS at 10:50

## 2023-11-19 RX ADMIN — CEFAZOLIN 2 G: 1 INJECTION, POWDER, FOR SOLUTION INTRAMUSCULAR; INTRAVENOUS at 09:01

## 2023-11-19 RX ADMIN — HYDROMORPHONE HYDROCHLORIDE 0.4 MG: 1 INJECTION, SOLUTION INTRAMUSCULAR; INTRAVENOUS; SUBCUTANEOUS at 09:58

## 2023-11-19 RX ADMIN — FENTANYL CITRATE 50 MCG: 50 INJECTION, SOLUTION INTRAMUSCULAR; INTRAVENOUS at 05:42

## 2023-11-19 RX ADMIN — HYDROMORPHONE HYDROCHLORIDE 0.2 MG: 1 INJECTION, SOLUTION INTRAMUSCULAR; INTRAVENOUS; SUBCUTANEOUS at 10:16

## 2023-11-19 RX ADMIN — ACETAMINOPHEN 650 MG: 325 TABLET ORAL at 08:31

## 2023-11-19 RX ADMIN — FENTANYL CITRATE 50 MCG: 50 INJECTION, SOLUTION INTRAMUSCULAR; INTRAVENOUS at 08:47

## 2023-11-19 RX ADMIN — ASPIRIN 81 MG: 81 TABLET, COATED ORAL at 12:43

## 2023-11-19 RX ADMIN — SUGAMMADEX 200 MG: 100 INJECTION, SOLUTION INTRAVENOUS at 10:29

## 2023-11-19 RX ADMIN — ONDANSETRON 4 MG: 2 INJECTION INTRAMUSCULAR; INTRAVENOUS at 09:31

## 2023-11-19 RX ADMIN — HYDROMORPHONE HYDROCHLORIDE 1 MG: 1 INJECTION, SOLUTION INTRAMUSCULAR; INTRAVENOUS; SUBCUTANEOUS at 03:26

## 2023-11-19 RX ADMIN — FENTANYL CITRATE 50 MCG: 50 INJECTION INTRAMUSCULAR; INTRAVENOUS at 05:42

## 2023-11-19 RX ADMIN — SODIUM CHLORIDE, POTASSIUM CHLORIDE, SODIUM LACTATE AND CALCIUM CHLORIDE 100 ML/HR: 600; 310; 30; 20 INJECTION, SOLUTION INTRAVENOUS at 12:20

## 2023-11-19 RX ADMIN — METOCLOPRAMIDE 10 MG: 5 INJECTION, SOLUTION INTRAMUSCULAR; INTRAVENOUS at 08:47

## 2023-11-19 RX ADMIN — CEFAZOLIN SODIUM 1 G: 1 INJECTION, SOLUTION INTRAVENOUS at 06:43

## 2023-11-19 RX ADMIN — APREPITANT 40 MG: 40 CAPSULE ORAL at 08:30

## 2023-11-19 RX ADMIN — HYDROMORPHONE HYDROCHLORIDE 0.5 MG: 1 INJECTION, SOLUTION INTRAMUSCULAR; INTRAVENOUS; SUBCUTANEOUS at 11:12

## 2023-11-19 SDOH — ECONOMIC STABILITY: FOOD INSECURITY: WITHIN THE PAST 12 MONTHS, YOU WORRIED THAT YOUR FOOD WOULD RUN OUT BEFORE YOU GOT MONEY TO BUY MORE.: NEVER TRUE

## 2023-11-19 SDOH — SOCIAL STABILITY: SOCIAL INSECURITY
WITHIN THE LAST YEAR, HAVE YOU BEEN KICKED, HIT, SLAPPED, OR OTHERWISE PHYSICALLY HURT BY YOUR PARTNER OR EX-PARTNER?: NO

## 2023-11-19 SDOH — SOCIAL STABILITY: SOCIAL NETWORK
DO YOU BELONG TO ANY CLUBS OR ORGANIZATIONS SUCH AS CHURCH GROUPS UNIONS, FRATERNAL OR ATHLETIC GROUPS, OR SCHOOL GROUPS?: NO

## 2023-11-19 SDOH — ECONOMIC STABILITY: INCOME INSECURITY: IN THE PAST 12 MONTHS, HAS THE ELECTRIC, GAS, OIL, OR WATER COMPANY THREATENED TO SHUT OFF SERVICE IN YOUR HOME?: NO

## 2023-11-19 SDOH — SOCIAL STABILITY: SOCIAL INSECURITY: ARE YOU OR HAVE YOU BEEN THREATENED OR ABUSED PHYSICALLY, EMOTIONALLY, OR SEXUALLY BY ANYONE?: NO

## 2023-11-19 SDOH — SOCIAL STABILITY: SOCIAL INSECURITY: DO YOU FEEL UNSAFE GOING BACK TO THE PLACE WHERE YOU ARE LIVING?: NO

## 2023-11-19 SDOH — SOCIAL STABILITY: SOCIAL NETWORK: IN A TYPICAL WEEK, HOW MANY TIMES DO YOU TALK ON THE PHONE WITH FAMILY, FRIENDS, OR NEIGHBORS?: ONCE A WEEK

## 2023-11-19 SDOH — SOCIAL STABILITY: SOCIAL INSECURITY: WITHIN THE LAST YEAR, HAVE YOU BEEN HUMILIATED OR EMOTIONALLY ABUSED IN OTHER WAYS BY YOUR PARTNER OR EX-PARTNER?: NO

## 2023-11-19 SDOH — SOCIAL STABILITY: SOCIAL INSECURITY
WITHIN THE LAST YEAR, HAVE TO BEEN RAPED OR FORCED TO HAVE ANY KIND OF SEXUAL ACTIVITY BY YOUR PARTNER OR EX-PARTNER?: NO

## 2023-11-19 SDOH — SOCIAL STABILITY: SOCIAL INSECURITY: DOES ANYONE TRY TO KEEP YOU FROM HAVING/CONTACTING OTHER FRIENDS OR DOING THINGS OUTSIDE YOUR HOME?: NO

## 2023-11-19 SDOH — SOCIAL STABILITY: SOCIAL NETWORK: HOW OFTEN DO YOU GET TOGETHER WITH FRIENDS OR RELATIVES?: ONCE A WEEK

## 2023-11-19 SDOH — SOCIAL STABILITY: SOCIAL NETWORK: ARE YOU MARRIED, WIDOWED, DIVORCED, SEPARATED, NEVER MARRIED, OR LIVING WITH A PARTNER?: MARRIED

## 2023-11-19 SDOH — SOCIAL STABILITY: SOCIAL INSECURITY: HAS ANYONE EVER THREATENED TO HURT YOUR FAMILY OR YOUR PETS?: NO

## 2023-11-19 SDOH — SOCIAL STABILITY: SOCIAL INSECURITY: WITHIN THE LAST YEAR, HAVE YOU BEEN AFRAID OF YOUR PARTNER OR EX-PARTNER?: NO

## 2023-11-19 SDOH — ECONOMIC STABILITY: FOOD INSECURITY: WITHIN THE PAST 12 MONTHS, THE FOOD YOU BOUGHT JUST DIDN'T LAST AND YOU DIDN'T HAVE MONEY TO GET MORE.: NEVER TRUE

## 2023-11-19 SDOH — SOCIAL STABILITY: SOCIAL INSECURITY: DO YOU FEEL ANYONE HAS EXPLOITED OR TAKEN ADVANTAGE OF YOU FINANCIALLY OR OF YOUR PERSONAL PROPERTY?: NO

## 2023-11-19 SDOH — SOCIAL STABILITY: SOCIAL INSECURITY: ARE THERE ANY APPARENT SIGNS OF INJURIES/BEHAVIORS THAT COULD BE RELATED TO ABUSE/NEGLECT?: NO

## 2023-11-19 SDOH — HEALTH STABILITY: MENTAL HEALTH
STRESS IS WHEN SOMEONE FEELS TENSE, NERVOUS, ANXIOUS, OR CAN'T SLEEP AT NIGHT BECAUSE THEIR MIND IS TROUBLED. HOW STRESSED ARE YOU?: NOT AT ALL

## 2023-11-19 SDOH — HEALTH STABILITY: MENTAL HEALTH: CURRENT SMOKER: 1

## 2023-11-19 SDOH — SOCIAL STABILITY: SOCIAL INSECURITY: WERE YOU ABLE TO COMPLETE ALL THE BEHAVIORAL HEALTH SCREENINGS?: YES

## 2023-11-19 SDOH — SOCIAL STABILITY: SOCIAL NETWORK: HOW OFTEN DO YOU ATTENT MEETINGS OF THE CLUB OR ORGANIZATION YOU BELONG TO?: NEVER

## 2023-11-19 SDOH — SOCIAL STABILITY: SOCIAL NETWORK: HOW OFTEN DO YOU ATTEND CHURCH OR RELIGIOUS SERVICES?: NEVER

## 2023-11-19 SDOH — HEALTH STABILITY: PHYSICAL HEALTH: ON AVERAGE, HOW MANY MINUTES DO YOU ENGAGE IN EXERCISE AT THIS LEVEL?: 30 MIN

## 2023-11-19 SDOH — SOCIAL STABILITY: SOCIAL INSECURITY: HAVE YOU HAD THOUGHTS OF HARMING ANYONE ELSE?: NO

## 2023-11-19 SDOH — HEALTH STABILITY: PHYSICAL HEALTH: ON AVERAGE, HOW MANY DAYS PER WEEK DO YOU ENGAGE IN MODERATE TO STRENUOUS EXERCISE (LIKE A BRISK WALK)?: 4 DAYS

## 2023-11-19 SDOH — SOCIAL STABILITY: SOCIAL INSECURITY: ABUSE: ADULT

## 2023-11-19 ASSESSMENT — PAIN - FUNCTIONAL ASSESSMENT
PAIN_FUNCTIONAL_ASSESSMENT: 0-10

## 2023-11-19 ASSESSMENT — COGNITIVE AND FUNCTIONAL STATUS - GENERAL
DRESSING REGULAR LOWER BODY CLOTHING: A LITTLE
STANDING UP FROM CHAIR USING ARMS: A LITTLE
TURNING FROM BACK TO SIDE WHILE IN FLAT BAD: A LITTLE
PERSONAL GROOMING: A LITTLE
HELP NEEDED FOR BATHING: A LITTLE
PATIENT BASELINE BEDBOUND: NO
CLIMB 3 TO 5 STEPS WITH RAILING: A LITTLE
WALKING IN HOSPITAL ROOM: A LITTLE
DRESSING REGULAR UPPER BODY CLOTHING: A LITTLE
TOILETING: A LITTLE
MOVING FROM LYING ON BACK TO SITTING ON SIDE OF FLAT BED WITH BEDRAILS: A LITTLE
MOVING TO AND FROM BED TO CHAIR: A LITTLE
MOBILITY SCORE: 18
DAILY ACTIVITIY SCORE: 18
EATING MEALS: A LITTLE

## 2023-11-19 ASSESSMENT — PAIN SCALES - PAIN ASSESSMENT IN ADVANCED DEMENTIA (PAINAD)
NEGVOCALIZATION: OCCASIONAL MOAN/GROAN, LOW SPEECH, NEGATIVE/DISAPPROVING QUALITY
FACIALEXPRESSION: FACIAL GRIMACING

## 2023-11-19 ASSESSMENT — ACTIVITIES OF DAILY LIVING (ADL)
JUDGMENT_ADEQUATE_SAFELY_COMPLETE_DAILY_ACTIVITIES: YES
GROOMING: INDEPENDENT
BATHING: INDEPENDENT
WALKS IN HOME: INDEPENDENT
ADEQUATE_TO_COMPLETE_ADL: YES
HEARING - RIGHT EAR: FUNCTIONAL
PATIENT'S MEMORY ADEQUATE TO SAFELY COMPLETE DAILY ACTIVITIES?: YES
LACK_OF_TRANSPORTATION: NO
WALKS IN HOME: INDEPENDENT
HEARING - LEFT EAR: FUNCTIONAL
JUDGMENT_ADEQUATE_SAFELY_COMPLETE_DAILY_ACTIVITIES: YES
HEARING - LEFT EAR: FUNCTIONAL
FEEDING YOURSELF: INDEPENDENT
TOILETING: INDEPENDENT
TOILETING: INDEPENDENT
DRESSING YOURSELF: INDEPENDENT
PATIENT'S MEMORY ADEQUATE TO SAFELY COMPLETE DAILY ACTIVITIES?: YES
FEEDING YOURSELF: INDEPENDENT
GROOMING: INDEPENDENT
ADEQUATE_TO_COMPLETE_ADL: YES
DRESSING YOURSELF: INDEPENDENT
BATHING: INDEPENDENT
HEARING - RIGHT EAR: FUNCTIONAL

## 2023-11-19 ASSESSMENT — LIFESTYLE VARIABLES
AUDIT TOTAL SCORE: 0
HAVE YOU OR SOMEONE ELSE BEEN INJURED AS A RESULT OF YOUR DRINKING: NO
SUBSTANCE_ABUSE_PAST_12_MONTHS: YES
HOW OFTEN DURING THE LAST YEAR HAVE YOU FAILED TO DO WHAT WAS NORMALLY EXPECTED FROM YOU BECAUSE OF DRINKING: NEVER
AUDIT TOTAL SCORE: 4
HOW MANY STANDARD DRINKS CONTAINING ALCOHOL DO YOU HAVE ON A TYPICAL DAY: 1 OR 2
PRESCIPTION_ABUSE_PAST_12_MONTHS: YES
HAS A RELATIVE, FRIEND, DOCTOR, OR ANOTHER HEALTH PROFESSIONAL EXPRESSED CONCERN ABOUT YOUR DRINKING OR SUGGESTED YOU CUT DOWN: NO
HOW OFTEN DURING THE LAST YEAR HAVE YOU HAD A FEELING OF GUILT OR REMORSE AFTER DRINKING: NEVER
HOW OFTEN DO YOU HAVE 6 OR MORE DRINKS ON ONE OCCASION: LESS THAN MONTHLY
SKIP TO QUESTIONS 9-10: 0
AUDIT-C TOTAL SCORE: 4
HOW OFTEN DURING THE LAST YEAR HAVE YOU NEEDED AN ALCOHOLIC DRINK FIRST THING IN THE MORNING TO GET YOURSELF GOING AFTER A NIGHT OF HEAVY DRINKING: NEVER
AUDIT-C TOTAL SCORE: 4
HOW OFTEN DURING THE LAST YEAR HAVE YOU FOUND THAT YOU WERE NOT ABLE TO STOP DRINKING ONCE YOU HAD STARTED: NEVER
HOW OFTEN DURING THE LAST YEAR HAVE YOU BEEN UNABLE TO REMEMBER WHAT HAPPENED THE NIGHT BEFORE BECAUSE YOU HAD BEEN DRINKING: NEVER
HOW OFTEN DO YOU HAVE A DRINK CONTAINING ALCOHOL: 2-3 TIMES A WEEK

## 2023-11-19 ASSESSMENT — PAIN DESCRIPTION - LOCATION
LOCATION: LEG
LOCATION: LEG
LOCATION: ANKLE
LOCATION: LEG
LOCATION: LEG
LOCATION: ANKLE

## 2023-11-19 ASSESSMENT — PAIN SCALES - GENERAL
PAINLEVEL_OUTOF10: 10 - WORST POSSIBLE PAIN
PAINLEVEL_OUTOF10: 9
PAINLEVEL_OUTOF10: 6
PAINLEVEL_OUTOF10: 5 - MODERATE PAIN
PAINLEVEL_OUTOF10: 5 - MODERATE PAIN
PAINLEVEL_OUTOF10: 7
PAINLEVEL_OUTOF10: 10 - WORST POSSIBLE PAIN
PAINLEVEL_OUTOF10: 7
PAINLEVEL_OUTOF10: 8
PAINLEVEL_OUTOF10: 3

## 2023-11-19 ASSESSMENT — PATIENT HEALTH QUESTIONNAIRE - PHQ9
SUM OF ALL RESPONSES TO PHQ9 QUESTIONS 1 & 2: 0
1. LITTLE INTEREST OR PLEASURE IN DOING THINGS: NOT AT ALL
2. FEELING DOWN, DEPRESSED OR HOPELESS: NOT AT ALL

## 2023-11-19 ASSESSMENT — PAIN DESCRIPTION - ORIENTATION
ORIENTATION: RIGHT
ORIENTATION: LEFT
ORIENTATION: RIGHT

## 2023-11-19 ASSESSMENT — COLUMBIA-SUICIDE SEVERITY RATING SCALE - C-SSRS
6. HAVE YOU EVER DONE ANYTHING, STARTED TO DO ANYTHING, OR PREPARED TO DO ANYTHING TO END YOUR LIFE?: NO
2. HAVE YOU ACTUALLY HAD ANY THOUGHTS OF KILLING YOURSELF?: NO
1. IN THE PAST MONTH, HAVE YOU WISHED YOU WERE DEAD OR WISHED YOU COULD GO TO SLEEP AND NOT WAKE UP?: NO

## 2023-11-19 ASSESSMENT — PAIN SCALES - WONG BAKER
WONGBAKER_NUMERICALRESPONSE: HURTS EVEN MORE
WONGBAKER_NUMERICALRESPONSE: HURTS WHOLE LOT
WONGBAKER_NUMERICALRESPONSE: HURTS WORST

## 2023-11-19 NOTE — OP NOTE
Insertion Intramedullary Nail Tibia (R), Debridement Lower Extremity (R) Operative Note     Date: 2023  OR Location: [facility] OR    Name: William Moya, : 1990, Age: 33 y.o., MRN: [id number], Sex: female    Diagnosis  Pre-op Diagnosis     * Tibia and fibula open fracture, right, sequela [S82.201S, S82.401S] Post-op Diagnosis     * Tibia and fibula open fracture, right, sequela [S82.201S, S82.401S]     Procedures  Insertion Intramedullary Nail Tibia  37377 - [place] TX TIBL SHFT FX IMED IMPLT W/WO SCREWS&/CERCLA    Debridement Lower Extremity  71767 - [place] DEBRIDEMENT BONE MUSCLE &/FASCIA 20 SQ CM/<  Irrigation and debridement right open tibia fracture down to and including bone and subcutaneous tissue  ORIF R tibial plafond  IMN R tibia  Right ankle stress exam under anesthesia    Surgeons      * Zurdo Guerra - Primary    Resident/Fellow/Other Assistant:  Surgeon(s) and Role:     * Lucas R Haase, MD - Resident - Assisting    Procedure Summary  Anesthesia: General  ASA: II  Anesthesia Staff: Anesthesiologist: Donna Bunch MD  C-AA: DANILO Robertson  Estimated Blood Loss: 100mL  Intra-op Medications:   Medication Name Total Dose   sodium chloride 0.9 % irrigation solution 3,000 mL   vancomycin (Vancocin) vial for injection 1 g   tobramycin (Nebcin) injection 1,200 mg   acetaminophen (Tylenol) tablet 650 mg 650 mg   celecoxib (CeleBREX) capsule 200 mg 200 mg   gabapentin (Neurontin) capsule 300 mg 300 mg   acetaminophen (Tylenol) tablet 650 mg Cannot be calculated   benzocaine-menthol (Cepastat Sore Throat) 15-3.6 mg lozenge 1 lozenge Cannot be calculated   bisacodyl (Dulcolax) suppository 10 mg Cannot be calculated   cyclobenzaprine (Flexeril) tablet 10 mg Cannot be calculated   HYDROmorphone (Dilaudid) injection 0.5 mg Cannot be calculated   lactated Ringer's infusion 16.67 mL   magnesium hydroxide (Milk of Magnesia) 2,400 mg/10 mL suspension 10 mL Cannot be calculated   naloxone (Narcan) injection 0.2 mg  Cannot be calculated   naloxone (Narcan) injection 0.2 mg Cannot be calculated   oxyCODONE (Roxicodone) immediate release tablet 10 mg Cannot be calculated   oxyCODONE (Roxicodone) immediate release tablet 2.5 mg Cannot be calculated   oxyCODONE (Roxicodone) immediate release tablet 5 mg Cannot be calculated   pantoprazole (ProtoNix) EC tablet 40 mg Cannot be calculated   sennosides-docusate sodium (Samantha-Colace) 8.6-50 mg per tablet 2 tablet Cannot be calculated              Anesthesia Record               Intraprocedure I/O Totals          Intake    Propofol Drip 0.00 mL    The total shown is the total volume documented since Anesthesia Start was filed.    Total Intake 0 mL          Specimen: No specimens collected     Staff:   Circulator: Anamika Morin RN  Scrub Person: Neelam Reyes         Drains and/or Catheters: * None in log *    Tourniquet Times:         Implants:  Implants       Type Name Action Serial No.      Screw WIRE, JOEY 3 X 285 - [id number] Used, Not Implanted      Joint GUIDE WIRE, ANGEL, 3.0MM X 1000MM - [id number] Used, Not Implanted      Joint NAIL, TIBIAL, T2 ALPHA, 9 X 345MM - [id number] Implanted      Screw SCREW, LOCKING, 5 X 40MM - [id number] Implanted      Screw SCREW, LOCKING, 5 X 45MM - [id number] Implanted      Implant KIT, AUGMENT INJECTABLE 3CC - [id number] Implanted      Screw SCREW, LOCKING, 5 X 35MM - [id number] Implanted      Screw SCREW, LOCKING, 5 X 45MM - [id number] Implanted      Screw SCREW, BONE, T8 FULL THREAD, 2.7 X 34 MM - [id number] Implanted      Screw SCREW, BONE, T8 FULL THREAD, 2.7 X36 MM - [id number] Implanted               Findings: Open right tibia fracture with non displaced plafond fracture    Indications:   INDICATIONS:  This is a 33 year old female who presented to the ED with right ankle pain and bleeding after a fall. she was worked up in the ED with CT scan and found to have an associated posterior malleolus fracture. She received appropriate abx  prophylaxis.She was indicated intramedullary nail of the right tibia. Risks, benefits and alternatives of surgery were discussed at length.  These include, but not limited to damage to nerves or blood vessels, blood loss, infection, nonunion, malunion, need for revision surgery, DVT, PE, MI, or any other unanticipated anesthesia complications.  The patient understood these risks and elected to proceed.  Informed consent was obtained.    DESCRIPTION OF PROCEDURE:    On the date of surgery, the patient was met in the preoperative holding area.  Once again, risks, benefits and alternatives of surgery were discussed at length and informed consent was confirmed. The patient was brought back to the operating room table and placed in the supine position with bone foam under the right leg on a stille table.  At this point, a time-out was performed where the procedure, the patient and the extremity were confirmed and there were no outstanding issues at this point in time.  The patient was then induced under general endotracheal anesthesia by the Anesthesia staff.  He was administered cefazolin for surgical prophylaxis. TXA was also administered by the anesthesia staff. The right lower extremity was then prepped and draped in typical sterile fashion.    Procedure was begun with irrigation and debridement of the open fracture. There was a poke hole open wound over the anteromedial tiba, this was extended both proximally and distally sharply to allow full visualization of the fracture ends. Currettes and rongeur were used to debride the bone and subcutaneous tissue. This was then irrigated with 3L of NS.    We next proceeded with fixation of the nondisplaced tibial plafond fracture. A fully threaded 2.7mm screw was introduced in a anterior to posterior trajectory. A second 2.7mm was placed in a similar fashion. Fluoro confirmed appropriate placement with regard to the joint and the posterior malleolus fracture.      We then  began with the placement of our intramedullary nail. The patella was palpated and an incision was made roughly 4 cm in length at the proximal aspect of the patella. A 10blade was used to incise the skin, subcutaneous tissue and quad tendon. The tibial nail sleeve was then introduced under the patella and the start point was identified on the medial aspect of the lateral tibial spine. The started wire was placed followed by the opening reamer. The fracture was then reduced anatomically. This was a spiral fracture with a large medial spike. A pointed reduction clamp was introduced through the traumatic wound, this provided anatomic reduction of the fracture. We then introduced the ball tipped guide wire, and measured the appropriate length of the nail to be 345mm. We sequentially reamed the intramedullary canal to a 10.5mm reamer to introduce a 9mm x 345 mm tibial nail. The nail was inserted and using the guide. We placed two transverse interlocking screws proximally in the static holes. We then proceeded to place two distal interlocking screws utilizing the distal targeting guide.   Fluoroscopy confirmed appropriate alignment and placement of the intramedullary nail. Prior to removing the tibial nail , the ankle was stressed with a dorsiflexion/external rotation stress. This did not demonstrate syndesmotic instability.     We then thoroughly irrigated the wounds. The suprapatellar incision was closed with 0 vicryl, 2-0 vicryl and staples.The remainder of the percutaneous incisions were closed with staples. The traumatic wound was closed with 2-0 vicryl and 2-0 nylon in a horizontal matterss fashion. Incisions were dressed with a combination of mepilex, xeroform, fluff and an ACE wrap.  The patient was awoken uneventfully from anesthesia and transferred from the OR table to the [facility]. He was transferred to the PACU to further recover from the anesthetic.    Post-operative Plan:        The patient  will be weight bearing as tolerated to the operative leg. She will be admitted to the orthopedics service for physical therapy and 24 hours of perioperative antibiotics. She will be started on ASA 81mg BID for 4 weeks for his DVT chemo ppx. She will follow up with Dr. Guerra in 3 weeks for a post operative visit with XR of the tibia at that time.     Complications:  None; patient tolerated the procedure well.    Disposition: PACU - hemodynamically stable.  Condition: stable         Additional Details: Weight bearing as tolerated RLE  Ancef x 24 hours  ASA 81mg BID  PT/OT    Attending Attestation:     Zurdo Guerra  Phone Number: [phone]

## 2023-11-19 NOTE — ANESTHESIA PREPROCEDURE EVALUATION
"Patient: William Moya    Procedure Information       Date/Time: 11/19/23 0740    Procedures:       Insertion Intramedullary Nail Tibia (Right: Leg Lower)      Debridement Lower Extremity (Right: Leg Lower)    Location: [facility] OR 09 / [facility] OR    Surgeons: Zurdo Guerra MD          Past Medical History:   Diagnosis Date    Disease of stomach and duodenum, unspecified     Stomach problems    Encounter for gynecological examination (general) (routine) without abnormal findings 02/12/2016    Well woman exam    Headache, unspecified 12/30/2013    Headache    Other conditions influencing health status     Menstruation    Other conditions influencing health status     History of pregnancy    Presence of (intrauterine) contraceptive device     IUD contraception        Chemistry    Lab Results   Component Value Date/Time     11/19/2023 0425    K 3.3 (L) 11/19/2023 0425     (H) 11/19/2023 0425    CO2 21 11/19/2023 0425    BUN 10 11/19/2023 0425    CREATININE 0.59 11/19/2023 0425    Lab Results   Component Value Date/Time    CALCIUM 8.1 (L) 11/19/2023 0425          Lab Results   Component Value Date/Time    WBC 11.8 (H) 11/19/2023 0425    HGB 12.3 11/19/2023 0425    HCT 36.4 11/19/2023 0425     11/19/2023 0425     Vitals:    11/19/23 0630   BP: (!) 132/93   Pulse: 87   Resp: 18   Temp:    SpO2: 98%      No results found for: \"PROTIME\", \"PTT\", \"INR\"  No results found for this or any previous visit (from the past 4464 hour(s)).  No results found for this or any previous visit from the past 1095 days.   Relevant Problems   No relevant active problems       Clinical information reviewed:   Tobacco  Allergies    Med Hx  Surg Hx  OB Status           NPO Detail:  NPO/Void Status  Date of Last Liquid: 11/19/23         Physical Exam    Airway  Mallampati: II  TM distance: >3 FB  Neck ROM: full     Cardiovascular   Rhythm: regular  Rate: normal     Dental    Pulmonary    Abdominal  "         Anesthesia Plan    ASA 2     general     The patient is a current smoker.  Patient did not smoke on day of procedure.    intravenous induction   Anesthetic plan and risks discussed with patient.

## 2023-11-19 NOTE — ED NOTES
Pharmacy Medication History Review    William Moya is a 33 y.o. female admitted for Tibia and fibula open fracture, right, sequela. Pharmacy reviewed the patient's [id number] medications and allergies for accuracy.    The list below reflects the updated PTA list. Comments regarding how patient may be taking medications differently can be found in the Admit Orders Activity  Prior to Admission Medications   Prescriptions Last Dose Informant Patient Reported? Taking?   NON FORMULARY   Yes Yes   Sig: Take 1 each by mouth once daily. Tumeric suppliment   levonorgestreL (Liletta) 20.4 mcg/24 hrs (8 yrs) 52 mg intrauterine device   Yes Yes   Sig: by intrauterine route.   loratadine (Claritin) 10 mg tablet   Yes Yes   Sig: Take 1 tablet (10 mg) by mouth once daily.   pantoprazole (ProtoNix) 40 mg EC tablet   Yes Yes   Sig: Take 1 tablet (40 mg) by mouth once daily.   simethicone (Mylicon) 80 mg chewable tablet   Yes Yes   Sig: Chew 1 tablet (80 mg) every 6 hours if needed for flatulence.      Facility-Administered Medications: None        The list below reflects the updated allergy list. Please review each documented allergy for additional clarification and justification.  Allergies  Reviewed by hSelley Tom RN on 11/19/2023        Severity Reactions Comments    Latex Not Specified Unknown             Patient declines M2B at discharge. Pharmacy has been updated to [facility].    Sources used to complete the med history include medication dispense history, OARRS, care everywhere, and patient interview. Patient was a good historian of medications.    Below are additional concerns with the patient's PTA list.  None to note    Cristo GriffinD   Meds PGY1 Pharmacy Resident   [facility]  Please reach out via [employer] Chat for questions, or if no response call [employer] or [employer] “[facility]”

## 2023-11-19 NOTE — DISCHARGE INSTRUCTIONS
Follow-Up Instructions  You will need to be seen in clinic by Dr. Guerra in 2 weeks for a post-operative evaluation.    You will need to call and schedule an appointment, unless there is a previous appointment that appears on your discharge instructions.  The direct orthopaedic clinic appointment line phone number is [phone].  Please do not delay in calling to make this appointment.    You should also follow up with your primary care provider in 1-2 weeks.    Activity Restrictions  1) No driving until further instructed by your orthopaedic physician, which will be addressed at your outpatient appointments.    2) No driving or operating heavy machinery while taking narcotic pain medication.    3) Weight bearing status --> you can put weight on both legs as much as tolerated.     Discharge Medications  You have been sent home with the following home medications: Oxycodone, Colace, and Aspirin.  Please wean yourself off the oxycodone, as tolerated. A good time to take the medication is before physical therapy sessions and bedtime. Colace is a stool softener to reduce the narcotic pain medications cause. Take it twice a day while taking narcotic pain medication to ensure you maintain your regular bowel movement frequency. Baby aspirin is taken twice daily to help reduce your risk of blood clots.    You should also take tylenol 650mg every 6 hours as needed to reduce the amount of oxycodone you need for pain.    Wound care instructions:   1) Leave operative dressing in place until 7 days after surgery. Then remove and leave incision open to air. Let water run freely over incision when showering, do not scrub. Do not soak in pool or tub.    2) Call if any drainage after 7 days, increased redness/warmth/swelling at incision site, abnormal pain/tenderness of the extremity, abnormal swelling of the extremity that does not respond to elevation, SOB/chest pain.

## 2023-11-19 NOTE — ANESTHESIA POSTPROCEDURE EVALUATION
Patient: William Moya    Procedure Summary       Date: 11/19/23 Room / Location: [facility] OR 09 / Virtual [facility] OR    Anesthesia Start: 0830 Anesthesia Stop: 1055    Procedures:       Insertion Intramedullary Nail Tibia (Right: Leg Lower)      Debridement Lower Extremity (Right: Leg Lower) Diagnosis:       Tibia and fibula open fracture, right, sequela      (Tibia and fibula open fracture, right, sequela [S82.201S, S82.401S])    Surgeons: Zurdo Guerra MD Responsible Provider: Donna Bunch MD    Anesthesia Type: general ASA Status: 2            Anesthesia Type: general    Vitals Value Taken Time   /71 11/19/23 1115   Temp 37.3 °C (99.1 °F) 11/19/23 1040   Pulse 77 11/19/23 1122   Resp 11 11/19/23 1122   SpO2 100 % 11/19/23 1122   Vitals shown include unvalidated device data.    Anesthesia Post Evaluation    Patient location during evaluation: PACU  Patient participation: complete - patient participated  Level of consciousness: awake and alert  Pain management: adequate  Airway patency: patent  Cardiovascular status: hemodynamically stable  Respiratory status: acceptable  Hydration status: acceptable  Postoperative Nausea and Vomiting: none    No notable events documented.

## 2023-11-19 NOTE — BRIEF OP NOTE
Date: 2023  OR Location: [facility] OR    Name: William Moya, : 1990, Age: 33 y.o., MRN: [id number], Sex: female    Diagnosis  Pre-op Diagnosis     * Tibia and fibula open fracture, right, sequela [S82.201S, S82.401S] Post-op Diagnosis     * Tibia and fibula open fracture, right, sequela [S82.201S, S82.401S]     Procedures  Insertion Intramedullary Nail Tibia  39177 - [place] TX TIBL SHFT FX IMED IMPLT W/WO SCREWS&/CERCLA    Debridement Lower Extremity  94362 - [place] DEBRIDEMENT BONE MUSCLE &/FASCIA 20 SQ CM/<  ORIF R tibial plafond  IMN R tibia   Irrigation and debridement right open tibia  Stress exam under anesthesia right ankle    Surgeons      * Zurdo Guerra - Primary    Resident/Fellow/Other Assistant:  Surgeon(s) and Role:     * Lucas R Haase, MD - Resident - Assisting    Procedure Summary  Anesthesia: General  ASA: II  Anesthesia Staff: Anesthesiologist: Donna Bunch MD  C-AA: DANILO Robertson  Estimated Blood Loss: 100mL  Intra-op Medications:   Medication Name Total Dose   sodium chloride 0.9 % irrigation solution 3,000 mL   vancomycin (Vancocin) vial for injection 1 g   tobramycin (Nebcin) injection 1,200 mg   acetaminophen (Tylenol) tablet 650 mg Cannot be calculated   benzocaine-menthol (Cepastat Sore Throat) 15-3.6 mg lozenge 1 lozenge Cannot be calculated   bisacodyl (Dulcolax) suppository 10 mg Cannot be calculated   cyclobenzaprine (Flexeril) tablet 10 mg Cannot be calculated   HYDROmorphone (Dilaudid) injection 0.5 mg Cannot be calculated   magnesium hydroxide (Milk of Magnesia) 2,400 mg/10 mL suspension 10 mL Cannot be calculated   naloxone (Narcan) injection 0.2 mg Cannot be calculated   naloxone (Narcan) injection 0.2 mg Cannot be calculated   oxyCODONE (Roxicodone) immediate release tablet 10 mg Cannot be calculated   oxyCODONE (Roxicodone) immediate release tablet 2.5 mg Cannot be calculated   oxyCODONE (Roxicodone) immediate release tablet 5 mg Cannot be calculated   pantoprazole  (ProtoNix) EC tablet 40 mg Cannot be calculated   sennosides-docusate sodium (Samantha-Colace) 8.6-50 mg per tablet 2 tablet Cannot be calculated   acetaminophen (Tylenol) tablet 650 mg 650 mg   celecoxib (CeleBREX) capsule 200 mg 200 mg   gabapentin (Neurontin) capsule 300 mg 300 mg              Anesthesia Record               Intraprocedure I/O Totals          Intake    Propofol Drip 0.00 mL    The total shown is the total volume documented since Anesthesia Start was filed.    Total Intake 0 mL          Specimen: No specimens collected     Staff:   Circulator: Anamika Morin RN  Scrub Person: Neelam Reyes          Findings: Consistent with diagnosis, no opening of the syndesmosis on stress    Complications:  None; patient tolerated the procedure well.     Disposition: PACU - hemodynamically stable.  Condition: stable  Specimens Collected: No specimens collected  Attending Attestation:     Zurdo Guerra  Phone Number: [phone]

## 2023-11-19 NOTE — ED PROVIDER NOTES
HPI   Chief Complaint   Patient presents with   • Leg Injury     Right deformity       HPI  Patient is a 33-year-old female who presents to the emergency department for concerns over right lower leg injury following a fall.  Patient states that she was at a wine tasting and came home and tripped walking on her stairs, striking her leg on a stair.  There is deformity noted in the right lower extremity with a small open laceration to the anterior shin.  Patient is in visible pain and was given 100 mg of fentanyl in route.  Patient denies any other injuries, including to her ankle and knee of the right leg.  She does endorse some nausea due to pain.                  No data recorded                Patient History   Past Medical History:   Diagnosis Date   • Disease of stomach and duodenum, unspecified     Stomach problems   • Encounter for gynecological examination (general) (routine) without abnormal findings 02/12/2016    Well woman exam   • Headache, unspecified 12/30/2013    Headache   • Other conditions influencing health status     Menstruation   • Other conditions influencing health status     History of pregnancy   • Presence of (intrauterine) contraceptive device     IUD contraception     No past surgical history on file.  No family history on file.  Social History     Tobacco Use   • Smoking status: Some Days     Types: Cigarettes   • Smokeless tobacco: Not on file   Vaping Use   • Vaping Use: Never used   Substance Use Topics   • Alcohol use: Not Currently   • Drug use: Never       Physical Exam   ED Triage Vitals [11/19/23 0252]   Temp Heart Rate Resp BP   37.1 °C (98.7 °F) 81 18 120/78      SpO2 Temp Source Heart Rate Source Patient Position   99 % Temporal -- --      BP Location FiO2 (%)     -- --       Physical Exam  Vitals and nursing note reviewed.   Constitutional:       General: She is not in acute distress.     Appearance: She is well-developed.   HENT:      Head: Normocephalic and atraumatic.    Eyes:      Conjunctiva/sclera: Conjunctivae normal.   Cardiovascular:      Rate and Rhythm: Normal rate and regular rhythm.      Heart sounds: No murmur heard.  Pulmonary:      Effort: Pulmonary effort is normal. No respiratory distress.      Breath sounds: Normal breath sounds.   Abdominal:      Palpations: Abdomen is soft.      Tenderness: There is no abdominal tenderness.   Musculoskeletal:         General: No swelling.      Cervical back: Neck supple.      Right lower leg: Swelling, deformity and tenderness present.      Comments: Patient's right lower leg had severe tenderness to palpation and substantial swelling and deformity to the anterior tib-fib.  A small laceration was noted to the anterior tib-fib area   Skin:     General: Skin is warm and dry.      Capillary Refill: Capillary refill takes less than 2 seconds.   Neurological:      Mental Status: She is alert.   Psychiatric:         Mood and Affect: Mood normal.       ED Course & MDM   ED Course as of 11/23/23 1552   Sun Nov 19, 2023   0417 XR tibia fibula right 2 views [JY]   0513 XR tibia fibula right 2 views [JY]   0513 XR tibia fibula right 2 views [JY]   2306 CT ankle right wo IV contrast [DW]      ED Course User Index  [DW] Anmol Licea DO  [JY] Feng Gardner DO         Diagnoses as of 11/23/23 1552   Tibia/fibula fracture, right, open type I or II, initial encounter       Medical Decision Making  Patient is a 33-year-old female who presents to the emergency department for concerns over right lower leg injury following a mechanical fall.  Patient tripped and struck her shin on a stair, without head strike or loss of consciousness.  Patient was given 1 mg of Dilaudid and 50 mcg of fentanyl for pain control.  And x-ray of tib-fib, ankle, knee, and foot of the right leg was ordered.  Ortho was consulted and preop orders were placed.  A CT without contrast of the right tib-fib and ankle were ordered.  X-rays revealed an acute comminuted,  displaced, and angulated fractures of the proximal fibula and mid tibia, oblique linear lucency best appreciated on lateral views and favored to represent a nondisplaced fracture of the posterior malleolus, and soft tissue gas of the anterior right lower extremity.  Ortho splinted the right leg.  At time of signout patient care transferred to  with patient in stable condition awaiting Ortho recommendations.    Procedure  Procedures none     Anmol Licea DO  Resident  11/19/23 0719       Anmol Licea DO  Resident  11/23/23 2518

## 2023-11-19 NOTE — HOSPITAL COURSE
33 year-old female who presented with right open tibia/fibula fracture with associated posterior malleolus fracture. Patient is now s/p right tibia IMN on 11/19 by Dr. Guerra. On the day of surgery, patient was identified in the pre-operative holding area and agreeable to proceed with surgery. Written consent was obtained.  Please see operative note for further details of this procedure. Patient received 24 hours of thelma-operative antibiotics. Patient recovered in the PACU before transfer to a regular nursing floor. Patient was started on oxycodone, tylenol, and breakthrough dilaudid for pain control and ASA 81 mg bid for DVT prophylaxis. Physical therapy recommended continued recovery at home with continued physical therapy and wound care. On the day of discharge, patient was afebrile with stable vital signs. Patient was neurovascularly intact at time of discharge. Patient was discharged with prescription of ASA 81 mg bid for DVT prophylaxis for 4 weeks. Patient will follow-up with Dr. Guerra in 2 weeks for post-operative visit.

## 2023-11-19 NOTE — H&P
History Of Present Illness  Name: William Moya ([id number])  Primary: ED, anticipate ortho trauma  Staff: Charlie  Location: ED35  Injury: R GA1 open distal ? tibial shaft fx w intra-articular extension, proximal fibula fx  HPI: 33F (healthy) p/a GLF while walking up stairs after wine tasting this morning. States she tripped an struck her shin on a stair. + EtOH. No other injuries, secondary negative. Open wound over anterior mid-shin w gross deformity. Numb on dorsum of foot, good pulses, flickers toes DF/PF though very limited. XR w above injury. CT showing posterior mal fragment. LLS. Ancef, tetanus.  Plan: NPO. Work up in progress. Consented for I&D, IMN R tibia on 11/19 w Dr. Guerra.    PMH, PSH, FH, SH, Allx reviewed. Per above or EMR.   Full 12 point PROS done. Negative except what is mentioned above.      Objective:  Const: NAD. Cooperative  HEENT: NCAT, PERRL  Cards: RRR, peripherally well perf  Pulm: Breathing comfortably, O2 sat appropriate  Abdomen: soft, non-distended  Neuro: per MSK  Psych: appropriate mood/behavior  Skin: warm, dry, remainder per MSK  MSK:   - RLE gross deformity lower leg  - Poke hole open injury about anterior distal tibia  - Strong palpable pulses  - Diminished sensation foot dorsum, 1st webspace. Intact foot plantar aspect  - Ankle/toe DF/PF severely limited. Can flicker toes PF/DF though weak and delayed  - Negative MSK secondary    A&P:  33F (healthy) p/a GLF sustaining R GA1 open distal ? tibial shaft fx w intra-articular extension, proximal fibula fx    - NPO for upcoming surgery with orthopedics.  - Admit to ortho trauma  - Please obtain pre-operative labs/studies: T&S, PT/INR, CBC, BMP, COVID, CXR, EKG, pregnancy test prior to transfer to floor  - Consented and posted to OR schedule Tibia IMN, I&D  - WB status: NWB RLE LLS  - Pre-operative ABx: ancef 2g q8h  - No indication for transfusion pre-operatively, 2U PRBC on hold for OR  - DVT PPx: SCDs, hold chemoppx in setting of  "upcoming surgery     D/w attending.          D/w attending.       Past Medical History  She has a past medical history of Disease of stomach and duodenum, unspecified, Encounter for gynecological examination (general) (routine) without abnormal findings (02/12/2016), Headache, unspecified (12/30/2013), Other conditions influencing health status, Other conditions influencing health status, and Presence of (intrauterine) contraceptive device.    Surgical History  She has no past surgical history on file.     Social History  She reports that she has been smoking cigarettes. She does not have any smokeless tobacco history on file. She reports that she does not currently use alcohol. She reports that she does not use drugs.    Family History  No family history on file.     Allergies  Latex    Review of Systems     Physical Exam     Last Recorded Vitals  Blood pressure (!) 132/93, pulse 87, temperature 37.1 °C (98.7 °F), temperature source Temporal, resp. rate 18, height 1.6 m (5' 3\"), weight 53.5 kg (118 lb), SpO2 98 %.    Relevant Results      Scheduled medications  ceFAZolin in dextrose (iso-os), 1 g, intravenous, Once  ceFAZolin in dextrose (iso-os), 1 g, intravenous, Once      Continuous medications     PRN medications    Results for orders placed or performed during the hospital encounter of 11/19/23 (from the past 24 hour(s))   CBC and Auto Differential   Result Value Ref Range    WBC 11.8 (H) 4.4 - 11.3 x10*3/uL    nRBC 0.0 0.0 - 0.0 /100 WBCs    RBC 3.65 (L) 4.00 - 5.20 x10*6/uL    Hemoglobin 12.3 12.0 - 16.0 g/dL    Hematocrit 36.4 36.0 - 46.0 %     80 - 100 fL    MCH 33.7 26.0 - 34.0 pg    MCHC 33.8 32.0 - 36.0 g/dL    RDW 12.0 11.5 - 14.5 %    Platelets 305 150 - 450 x10*3/uL    Neutrophils % 81.0 40.0 - 80.0 %    Immature Granulocytes %, Automated 0.3 0.0 - 0.9 %    Lymphocytes % 11.7 13.0 - 44.0 %    Monocytes % 6.8 2.0 - 10.0 %    Eosinophils % 0.0 0.0 - 6.0 %    Basophils % 0.2 0.0 - 2.0 %    " Neutrophils Absolute 9.58 (H) 1.20 - 7.70 x10*3/uL    Immature Granulocytes Absolute, Automated 0.03 0.00 - 0.70 x10*3/uL    Lymphocytes Absolute 1.39 1.20 - 4.80 x10*3/uL    Monocytes Absolute 0.81 0.10 - 1.00 x10*3/uL    Eosinophils Absolute 0.00 0.00 - 0.70 x10*3/uL    Basophils Absolute 0.02 0.00 - 0.10 x10*3/uL   Basic metabolic panel   Result Value Ref Range    Glucose 92 74 - 99 mg/dL    Sodium 142 136 - 145 mmol/L    Potassium 3.3 (L) 3.5 - 5.3 mmol/L    Chloride 108 (H) 98 - 107 mmol/L    Bicarbonate 21 21 - 32 mmol/L    Anion Gap 16 10 - 20 mmol/L    Urea Nitrogen 10 6 - 23 mg/dL    Creatinine 0.59 0.50 - 1.05 mg/dL    eGFR >90 >60 mL/min/1.73m*2    Calcium 8.1 (L) 8.6 - 10.6 mg/dL   Type and Screen   Result Value Ref Range    ABO TYPE B     Rh TYPE NEG     ANTIBODY SCREEN NEG    hCG, quantitative, pregnancy   Result Value Ref Range    HCG, Beta-Quantitative <3 <5 mIU/mL       Assessment/Plan   Principal Problem:    Tibia and fibula open fracture, right, sequela

## 2023-11-19 NOTE — ED TRIAGE NOTES
Pt BIBA c/c of mechanical fall resulting in R lower Leg deformity /injury. Pt states she went wine tasting and came home and walking up her home stairs and tripped and leg hit stare. Small lac to R lower leg bleeding controled     Pt A&Ox3, pt alert calm cooperative with care. Pt resp even and unlabored.     PMH: N/A

## 2023-11-19 NOTE — H&P
[facility] Department of Orthopaedic Surgery   Surgical History & Physical <30 Days    Reason for Surgery: L tibia open fx  Planned Procedure: L tibia fx IMN, I&D    History & Physical Reviewed:  I have reviewed the History and Physical for obtained within the last 30 days. Relevant findings and updates are noted below:  No significant changes.    Home medications were reviewed with significant updates noted below:  No significant changes.    ERAS patient?: No    COVID-19 Risk Consent:   Surgeon has reviewed the key risks related to zabrina COVID-19 and subsequent sequelae.     11/19/23 at 7:32 AM - Theodore Morales DO

## 2023-11-19 NOTE — ANESTHESIA PROCEDURE NOTES
Airway  Date/Time: 11/19/2023 9:05 AM  Urgency: elective    Airway not difficult    Staffing  Performed: DANILO   Authorized by: DANILO Robertson    Performed by: DANILO Robertson  Patient location during procedure: OR    Indications and Patient Condition  Indications for airway management: anesthesia  Spontaneous Ventilation: absent  Sedation level: deep  Preoxygenated: yes  Patient position: sniffing  MILS maintained throughout  Mask difficulty assessment: 1 - vent by mask    Final Airway Details  Final airway type: endotracheal airway      Successful airway: ETT  Cuffed: yes   Successful intubation technique: direct laryngoscopy  Facilitating devices/methods: intubating stylet  Endotracheal tube insertion site: oral  Blade: Martha  Blade size: #3  ETT size (mm): 7.0  Cormack-Lehane Classification: grade I - full view of glottis  Placement verified by: chest auscultation and capnometry   Measured from: lips  ETT to lips (cm): 21  Number of attempts at approach: 1

## 2023-11-20 ENCOUNTER — HOME HEALTH ADMISSION (OUTPATIENT)
Dept: HOME HEALTH SERVICES | Facility: HOME HEALTH | Age: 33
End: 2023-11-20
Payer: COMMERCIAL

## 2023-11-20 ENCOUNTER — HOME CARE VISIT (OUTPATIENT)
Dept: HOME HEALTH SERVICES | Facility: HOME HEALTH | Age: 33
End: 2023-11-20

## 2023-11-20 ENCOUNTER — PHARMACY VISIT (OUTPATIENT)
Dept: PHARMACY | Facility: CLINIC | Age: 33
End: 2023-11-20
Payer: MEDICAID

## 2023-11-20 VITALS
BODY MASS INDEX: 20.91 KG/M2 | HEART RATE: 113 BPM | OXYGEN SATURATION: 100 % | WEIGHT: 118 LBS | RESPIRATION RATE: 17 BRPM | SYSTOLIC BLOOD PRESSURE: 111 MMHG | TEMPERATURE: 97.5 F | HEIGHT: 63 IN | DIASTOLIC BLOOD PRESSURE: 66 MMHG

## 2023-11-20 LAB
ANION GAP SERPL CALC-SCNC: 12 MMOL/L (ref 10–20)
BUN SERPL-MCNC: 16 MG/DL (ref 6–23)
CALCIUM SERPL-MCNC: 8.4 MG/DL (ref 8.6–10.6)
CHLORIDE SERPL-SCNC: 102 MMOL/L (ref 98–107)
CO2 SERPL-SCNC: 27 MMOL/L (ref 21–32)
CREAT SERPL-MCNC: 0.75 MG/DL (ref 0.5–1.05)
ERYTHROCYTE [DISTWIDTH] IN BLOOD BY AUTOMATED COUNT: 11.9 % (ref 11.5–14.5)
GFR SERPL CREATININE-BSD FRML MDRD: >90 ML/MIN/1.73M*2
GLUCOSE SERPL-MCNC: 89 MG/DL (ref 74–99)
HCT VFR BLD AUTO: 28.6 % (ref 36–46)
HGB BLD-MCNC: 9.3 G/DL (ref 12–16)
MCH RBC QN AUTO: 33.5 PG (ref 26–34)
MCHC RBC AUTO-ENTMCNC: 32.5 G/DL (ref 32–36)
MCV RBC AUTO: 103 FL (ref 80–100)
NRBC BLD-RTO: 0 /100 WBCS (ref 0–0)
PLATELET # BLD AUTO: 249 X10*3/UL (ref 150–450)
POTASSIUM SERPL-SCNC: 3.6 MMOL/L (ref 3.5–5.3)
RBC # BLD AUTO: 2.78 X10*6/UL (ref 4–5.2)
SODIUM SERPL-SCNC: 137 MMOL/L (ref 136–145)
WBC # BLD AUTO: 13.9 X10*3/UL (ref 4.4–11.3)

## 2023-11-20 PROCEDURE — 80048 BASIC METABOLIC PNL TOTAL CA: CPT | Performed by: STUDENT IN AN ORGANIZED HEALTH CARE EDUCATION/TRAINING PROGRAM

## 2023-11-20 PROCEDURE — 2500000001 HC RX 250 WO HCPCS SELF ADMINISTERED DRUGS (ALT 637 FOR MEDICARE OP): Performed by: STUDENT IN AN ORGANIZED HEALTH CARE EDUCATION/TRAINING PROGRAM

## 2023-11-20 PROCEDURE — 2500000001 HC RX 250 WO HCPCS SELF ADMINISTERED DRUGS (ALT 637 FOR MEDICARE OP)

## 2023-11-20 PROCEDURE — 36415 COLL VENOUS BLD VENIPUNCTURE: CPT | Performed by: STUDENT IN AN ORGANIZED HEALTH CARE EDUCATION/TRAINING PROGRAM

## 2023-11-20 PROCEDURE — RXMED WILLOW AMBULATORY MEDICATION CHARGE

## 2023-11-20 PROCEDURE — 97161 PT EVAL LOW COMPLEX 20 MIN: CPT | Mod: GP

## 2023-11-20 PROCEDURE — 85027 COMPLETE CBC AUTOMATED: CPT | Performed by: STUDENT IN AN ORGANIZED HEALTH CARE EDUCATION/TRAINING PROGRAM

## 2023-11-20 PROCEDURE — 97165 OT EVAL LOW COMPLEX 30 MIN: CPT | Mod: GO

## 2023-11-20 PROCEDURE — 2500000004 HC RX 250 GENERAL PHARMACY W/ HCPCS (ALT 636 FOR OP/ED): Performed by: STUDENT IN AN ORGANIZED HEALTH CARE EDUCATION/TRAINING PROGRAM

## 2023-11-20 RX ORDER — OXYCODONE HYDROCHLORIDE 5 MG/1
5 TABLET ORAL EVERY 4 HOURS PRN
Status: DISCONTINUED | OUTPATIENT
Start: 2023-11-20 | End: 2023-11-20 | Stop reason: HOSPADM

## 2023-11-20 RX ORDER — GABAPENTIN 100 MG/1
100 CAPSULE ORAL EVERY 8 HOURS SCHEDULED
Status: DISCONTINUED | OUTPATIENT
Start: 2023-11-20 | End: 2023-11-20 | Stop reason: HOSPADM

## 2023-11-20 RX ORDER — OXYCODONE HYDROCHLORIDE 5 MG/1
10 TABLET ORAL EVERY 6 HOURS PRN
Status: DISCONTINUED | OUTPATIENT
Start: 2023-11-20 | End: 2023-11-20 | Stop reason: HOSPADM

## 2023-11-20 RX ADMIN — ASPIRIN 81 MG: 81 TABLET, COATED ORAL at 09:59

## 2023-11-20 RX ADMIN — OXYCODONE HYDROCHLORIDE 10 MG: 5 TABLET ORAL at 14:45

## 2023-11-20 RX ADMIN — ACETAMINOPHEN 650 MG: 325 TABLET ORAL at 18:23

## 2023-11-20 RX ADMIN — OXYCODONE HYDROCHLORIDE 5 MG: 5 TABLET ORAL at 02:01

## 2023-11-20 RX ADMIN — ACETAMINOPHEN 650 MG: 325 TABLET ORAL at 00:11

## 2023-11-20 RX ADMIN — GABAPENTIN 100 MG: 100 CAPSULE ORAL at 18:23

## 2023-11-20 RX ADMIN — OXYCODONE HYDROCHLORIDE 10 MG: 5 TABLET ORAL at 18:22

## 2023-11-20 RX ADMIN — GABAPENTIN 100 MG: 100 CAPSULE ORAL at 09:59

## 2023-11-20 RX ADMIN — ACETAMINOPHEN 650 MG: 325 TABLET ORAL at 11:50

## 2023-11-20 RX ADMIN — OXYCODONE HYDROCHLORIDE 5 MG: 5 TABLET ORAL at 06:05

## 2023-11-20 RX ADMIN — ACETAMINOPHEN 650 MG: 325 TABLET ORAL at 01:01

## 2023-11-20 RX ADMIN — OXYCODONE HYDROCHLORIDE 10 MG: 5 TABLET ORAL at 10:05

## 2023-11-20 RX ADMIN — DOCUSATE SODIUM 100 MG: 100 CAPSULE, LIQUID FILLED ORAL at 09:59

## 2023-11-20 RX ADMIN — ACETAMINOPHEN 650 MG: 325 TABLET ORAL at 06:06

## 2023-11-20 RX ADMIN — CEFAZOLIN SODIUM 2 G: 2 INJECTION, SOLUTION INTRAVENOUS at 01:02

## 2023-11-20 ASSESSMENT — PAIN - FUNCTIONAL ASSESSMENT
PAIN_FUNCTIONAL_ASSESSMENT: 0-10

## 2023-11-20 ASSESSMENT — PAIN SCALES - GENERAL
PAINLEVEL_OUTOF10: 8
PAINLEVEL_OUTOF10: 7
PAINLEVEL_OUTOF10: 10 - WORST POSSIBLE PAIN
PAINLEVEL_OUTOF10: 9
PAINLEVEL_OUTOF10: 5 - MODERATE PAIN
PAINLEVEL_OUTOF10: 8

## 2023-11-20 ASSESSMENT — PAIN DESCRIPTION - ORIENTATION
ORIENTATION: RIGHT

## 2023-11-20 ASSESSMENT — COGNITIVE AND FUNCTIONAL STATUS - GENERAL
HELP NEEDED FOR BATHING: A LITTLE
PERSONAL GROOMING: A LITTLE
DAILY ACTIVITIY SCORE: 20
DRESSING REGULAR LOWER BODY CLOTHING: A LITTLE
WALKING IN HOSPITAL ROOM: A LITTLE
MOVING FROM LYING ON BACK TO SITTING ON SIDE OF FLAT BED WITH BEDRAILS: A LITTLE
HELP NEEDED FOR BATHING: A LITTLE
MOBILITY SCORE: 18
EATING MEALS: A LITTLE
MOVING FROM LYING ON BACK TO SITTING ON SIDE OF FLAT BED WITH BEDRAILS: A LITTLE
MOVING TO AND FROM BED TO CHAIR: A LITTLE
MOVING TO AND FROM BED TO CHAIR: A LITTLE
STANDING UP FROM CHAIR USING ARMS: A LITTLE
DAILY ACTIVITIY SCORE: 18
STANDING UP FROM CHAIR USING ARMS: A LITTLE
TOILETING: A LITTLE
WALKING IN HOSPITAL ROOM: A LITTLE
DRESSING REGULAR LOWER BODY CLOTHING: A LITTLE
MOBILITY SCORE: 18
DRESSING REGULAR UPPER BODY CLOTHING: A LITTLE
PERSONAL GROOMING: A LITTLE
TURNING FROM BACK TO SIDE WHILE IN FLAT BAD: A LITTLE
CLIMB 3 TO 5 STEPS WITH RAILING: A LITTLE
TOILETING: A LITTLE
CLIMB 3 TO 5 STEPS WITH RAILING: A LITTLE
TURNING FROM BACK TO SIDE WHILE IN FLAT BAD: A LITTLE

## 2023-11-20 ASSESSMENT — PAIN DESCRIPTION - LOCATION
LOCATION: ANKLE

## 2023-11-20 ASSESSMENT — ACTIVITIES OF DAILY LIVING (ADL)
BATHING_ASSISTANCE: STAND BY
ADL_ASSISTANCE: INDEPENDENT
ADL_ASSISTANCE: INDEPENDENT

## 2023-11-20 NOTE — PROGRESS NOTES
I met with William at the bedside regarding discharge planning and home going needs. Patient lives at home with her family and she is usually independent with ADL's without assistive devices. Patient is medically ready for discharge home with [facility] services via private transportation. Referral placed awaiting SOC I will continue to follow with a safe discharge plan.

## 2023-11-20 NOTE — PROGRESS NOTES
"Orthopaedic Surgery Progress Note    S:  No acute events overnight. Pain well controlled. Denies chest pain, shortness of breath, or fevers.    O:  /70   Pulse 86   Temp 38.2 °C (100.8 °F) (Tympanic)   Resp 16   Ht 1.6 m (5' 3\")   Wt 53.5 kg (118 lb)   SpO2 100%   BMI 20.90 kg/m²     Gen: arousable, NAD, appropriately conversational  Cardiac: RRR to peripheral palpation  Resp: nonlabored on RA  GI: soft, nondistended    MSK:  right Lower Extremity:   -dressing cdi  -Fires DF/PF/EHL/FHL  -decreased sensation over dorsum  -Foot warm, well perfused  -Palpable DP pulse, brisk cap refill  -Compartments soft and compressible      A/P: 33 y.o. female s/p R tibia nail, ORIF R tibia plafond on 11/19 with Dr. Guerra.      Plan:  - Weight bearing: WBAT RLE  - DVT ppx: SCDs, ASA BID  - Diet: Regular  - Pain: Tylenol, oxycodone 5/10  - Antibiotics: perioperative ancef 2g q8hr x3 doses  - FEN: HLIV with good PO intake  - Bowel Regimen: Colace, senna, dulcolax  - PT/OT  - Pulm: Encourage IS  - Continue home medications  - No lewis    Dispo: anticipate dc today    Marycruz Gonzáles, DO  PGY-2 Orthopedic Surgery  [facility]  Pager: [phone]  Available by Epic Message    While inpatient, this patient will be followed by the Orthopaedic Trauma team. Please see contact information below:    While admitted, this patient will be followed by the Ortho Trauma Team. Please contact below residents with any questions (available via Epic Chat).     First call: Gonzalo Washington, PGY-1  Second call: Marycruz Gonzáles, PGY-2  Third call: Michael Rutledge, PGY-3      "

## 2023-11-20 NOTE — CARE PLAN
The patient's goals for the shift include    Problem: Fall/Injury  Goal: Not fall by end of shift  Outcome: Progressing  Goal: Be free from injury by end of the shift  Outcome: Progressing  Goal: Verbalize understanding of personal risk factors for fall in the hospital  Outcome: Progressing  Goal: Verbalize understanding of risk factor reduction measures to prevent injury from fall in the home  Outcome: Progressing  Goal: Use assistive devices by end of the shift  Outcome: Progressing  Goal: Pace activities to prevent fatigue by end of the shift  Outcome: Progressing     Problem: Pain  Goal: Takes deep breaths with improved pain control throughout the shift  Outcome: Progressing  Goal: Turns in bed with improved pain control throughout the shift  Outcome: Progressing  Goal: Walks with improved pain control throughout the shift  Outcome: Progressing  Goal: Performs ADL's with improved pain control throughout shift  Outcome: Progressing  Goal: Participates in PT with improved pain control throughout the shift  Outcome: Progressing  Goal: Free from opioid side effects throughout the shift  Outcome: Progressing  Goal: Free from acute confusion related to pain meds throughout the shift  Outcome: Progressing       The clinical goals for the shift include pt will be able to rate pain of 5/10 or less by end of shift

## 2023-11-20 NOTE — PROGRESS NOTES
Physical Therapy    Physical Therapy Evaluation    Patient Name: William Moya  MRN: [id number]  Today's Date: 11/20/2023   Time Calculation  Start Time: 0830  Stop Time: 0851  Time Calculation (min): 21 min    Assessment/Plan   PT Assessment  PT Assessment Results: Decreased strength, Decreased endurance, Impaired balance, Decreased mobility  Rehab Prognosis: Excellent  Evaluation/Treatment Tolerance: Patient tolerated treatment well  Medical Staff Made Aware: Yes  End of Session Communication: Bedside nurse  Assessment Comment: pt tolerated session well despite high pain levels. Pt cooperative and motivated. Pt indep at baseline.  End of Session Patient Position: Up in chair  IP OR SWING BED PT PLAN  Inpatient or Swing Bed: Inpatient  PT Plan  Treatment/Interventions: Bed mobility, Transfer training, Gait training, Stair training, Balance training, Strengthening, Endurance training, Range of motion, Therapeutic exercise, Therapeutic activity  PT Plan: Skilled PT  PT Frequency: 6 times per week  PT Discharge Recommendations: Low intensity level of continued care  Equipment Recommended upon Discharge: Wheeled walker  PT Recommended Transfer Status: Contact guard  PT - OK to Discharge: Yes      Subjective   General Visit Information:  General  Reason for Referral: s/p R tibia nail, ORIF R tibia plafond on 11/19 with Dr. Guerra  Past Medical History Relevant to Rehab: No significant PMHx  Family/Caregiver Present: Yes  Caregiver Feedback: Pt's daughter present at bedside during session.  Co-Treatment: OT  Co-Treatment Reason: Co-treatment with OT to optimize safety for mobiltiy and patient with increased pain  Prior to Session Communication: Bedside nurse  Patient Position Received: Bed, 3 rail up  Home Living:  Home Living  Type of Home: House  Lives With: Spouse, Dependent children ( and dtr)  Home Adaptive Equipment: None  Home Access: Stairs to enter with rails  Entrance Stairs-Number of Steps: 15  Bathroom  "Shower/Tub: Tub/shower unit  Bathroom Toilet: Standard  Bathroom Equipment: Shower chair with back  Home Living Comments:  and dtr able to assist  Prior Level of Function:  Prior Function Per Pt/Caregiver Report  Level of [place]: Independent with ADLs and functional transfers, Independent with homemaking with ambulation  ADL Assistance: Independent  Homemaking Assistance: Independent  Ambulatory Assistance: Independent  Vocational: Part time employment  Precautions:  Precautions  LE Weight Bearing Status: Weight Bearing as Tolerated  Medical Precautions: Fall precautions    Objective   Pain:  Pain Assessment  Pain Assessment: 0-10  Pain Score:  (\"10.5\"/10 at rest)  Pain Type: Surgical pain  Pain Location: Leg  Pain Interventions: Cold pack  Cognition:  Cognition  Overall Cognitive Status: Within Functional Limits  Orientation Level: Oriented X4    General Assessments:      Activity Tolerance  Endurance: Endurance does not limit participation in activity    Sensation  Sensation Comment: pt reports numbness and burning in RLE  Functional Assessments:  Bed Mobility  Bed Mobility: Yes  Bed Mobility 1  Bed Mobility 1: Supine to sitting  Level of Assistance 1: Close supervision  Bed Mobility Comments 1: HOB slightly elevate,d pt long sitting    Transfers  Transfer: Yes  Transfer 1  Transfer From 1: Sit to, Stand to  Transfer to 1: Stand, Sit  Technique 1: Sit to stand, Stand to sit  Transfer Device 1: Walker  Transfer Level of Assistance 1: Contact guard, Minimal verbal cues  Trials/Comments 1: cues for hand placement using FWW    Ambulation/Gait Training  Ambulation/Gait Training Performed: Yes  Ambulation/Gait Training 1  Surface 1: Level tile  Device 1: Rolling walker  Assistance 1: Contact guard  Quality of Gait 1:  (decreased WB on RLE due to pain)  Comments/Distance (ft) 1: patient ambulated in room and into lott 25' with FWW. Patient able to complete despite high pain levels  Extremity/Trunk " Assessments:  RLE   RLE :  (hip appears WFL)  LLE   LLE : Within Functional Limits  Outcome Measures:  [facility] Basic Mobility  Turning from your back to your side while in a flat bed without using bedrails: A little  Moving from lying on your back to sitting on the side of a flat bed without using bedrails: A little  Moving to and from bed to chair (including a wheelchair): A little  Standing up from a chair using your arms (e.g. wheelchair or bedside chair): A little  To walk in hospital room: A little  Climbing 3-5 steps with railing: A little  Basic Mobility - Total Score: 18    Encounter Problems       Encounter Problems (Active)       Mobility       STG - Patient will ambulate 250' with LRAD and modif indep  (Progressing)       Start:  11/20/23    Expected End:  12/11/23            STG - Patient will ascend and descend 5+10 steps SBA (Progressing)       Start:  11/20/23    Expected End:  12/11/23               Transfers       STG - Patient will perform bed mobility indep (Progressing)       Start:  11/20/23    Expected End:  12/11/23            STG - Patient will transfer sit to and from stand indep LRAD (Progressing)       Start:  11/20/23    Expected End:  12/11/23                   Education Documentation  Precautions, taught by Linsey Stock PT at 11/20/2023 10:08 AM.  Learner: Patient  Readiness: Acceptance  Method: Explanation  Response: Verbalizes Understanding  Comment: edu on WBAT, use of FWW and role of PT    Mobility Training, taught by Linsey Stock PT at 11/20/2023 10:08 AM.  Learner: Patient  Readiness: Acceptance  Method: Explanation  Response: Verbalizes Understanding  Comment: edu on WBAT, use of FWW and role of PT    Education Comments  No comments found.

## 2023-11-20 NOTE — NURSING NOTE
Patient discharged. Family at bedside and discharge reviewed with family. Patient agreeable to discharge. IV removed. Meds delivered to patient.  at bedside to wheel patient out to car for home going

## 2023-11-20 NOTE — PROGRESS NOTES
Occupational Therapy    Evaluation    Patient Name: William Moya  MRN: [id number]  Today's Date: 11/20/2023       Assessment  IP OT Assessment  OT Assessment: Pt s/p R tibia nail, ORIF R tibia plafond on 11/19 with Dr. Guerra. Pt presents with decreased ADL status and functional mobility secondary to pain. Pt currently contact guard assist for functional mobility. Anticipate stand by assist for ADLs for safety. Pt would benefit from skilled OT services to address deficits and increase functional independence for safe DC home. Recommending low intensity continued level of care following DC.  Prognosis: Excellent  Barriers to Discharge: None  Evaluation/Treatment Tolerance: Patient tolerated treatment well (Pt was not limited due to pain.)  Medical Staff Made Aware: Yes  End of Session Communication: Bedside nurse  End of Session Patient Position: Up in chair, Alarm off, caregiver present (Daughter present at bedside.)  Plan:  Treatment Interventions: ADL retraining, Patient/family training, Equipment evaluation/education, Compensatory technique education  OT Frequency: 2 times per week  OT Discharge Recommendations: Low intensity level of continued care  OT Recommended Transfer Status: Assist of 1  OT - OK to Discharge: Yes    Subjective   Current Problem:  1. Tibia/fibula fracture, right, open type I or II, initial encounter  aspirin 81 mg EC tablet    oxyCODONE (Roxicodone) 5 mg immediate release tablet    docusate sodium (Colace) 100 mg capsule    calcium carbonate-vitamin D3 600 mg-10 mcg (400 unit) tablet      2. Tibia and fibula open fracture, right, sequela  Case Request Operating Room: Insertion Intramedullary Nail Tibia, Debridement Lower Extremity    Case Request Operating Room: Insertion Intramedullary Nail Tibia, Debridement Lower Extremity      3. Acute post-operative pain  oxyCODONE (Roxicodone) 5 mg immediate release tablet        General:  General  Reason for Referral: s/p R tibia nail, ORIF R tibia  renetta on 11/19 with Dr. Guerra.  Past Medical History Relevant to Rehab: No significant PMHx  Family/Caregiver Present: Yes  Caregiver Feedback: Pt's daughter present at bedside during session.  Co-Treatment: PT  Co-Treatment Reason: Co-treatment with PT to optimize safety for mobiltiy.  Prior to Session Communication: Bedside nurse  Patient Position Received: Bed, 3 rail up, Alarm off, not on at start of session (Daughter present at bedside)  Precautions:  LE Weight Bearing Status: Weight Bearing as Tolerated  Medical Precautions: Fall precautions  Vital Signs:     Pain:  Pain Assessment  Pain Assessment: 0-10  Pain Score: 10 - Worst possible pain  Pain Location: Ankle  Pain Orientation: Right  Pain Interventions: Cold applied  Pain 2  Pain Score 2: 10 - Worst possible pain (Numbness, radiating pain)  Pain Location 2: Knee  Pain Orientation 2: Right  Pain Interventions 2: Cold applied    Objective   Cognition:  Overall Cognitive Status: Within Functional Limits  Orientation Level: Oriented X4           Home Living:  Type of Home: House  Lives With: Spouse, Dependent children (Lives with  and daughter)  Home Adaptive Equipment: None  Home Layout: Two level (lives on second level)  Home Access: Stairs to enter with rails  Entrance Stairs-Number of Steps: 15  Bathroom Shower/Tub: Tub/shower unit  Bathroom Toilet: Standard  Bathroom Equipment: Shower chair with back  Home Living Comments: Per pt report, bed/bath accessible on one level;  and daughter able to assist PRN   Prior Function:  Level of Sac: Independent with ADLs and functional transfers, Independent with homemaking with ambulation  ADL Assistance: Independent  Homemaking Assistance: Independent  Ambulatory Assistance: Independent  Vocational: Part time employment  IADL History:  Current License: Yes  Occupation: Part time employment  Type of Occupation: Works at [employer]  ADL:  Eating Assistance: Independent  (anticipate)  Grooming Assistance: Stand by (anticipate)  Bathing Assistance: Stand by (anticipate)  UE Dressing Assistance: Independent (anticipate)  LE Dressing Assistance: Stand by (Pt donned socks long sitting in bed with SBA)  Toileting Assistance with Device: Stand by (anticipate)  Functional Assistance: Stand by (Pt ambulated OOB around room and in hallway using FWW and SBA. No LOB or SOB noted.)  Activity Tolerance:  Endurance: Endurance does not limit participation in activity  Bed Mobility/Transfers: Bed Mobility  Bed Mobility: Yes  Bed Mobility 1  Bed Mobility 1: Supine to sitting  Level of Assistance 1: Close supervision  Bed Mobility Comments 1: HOB elevated slightly    Transfers  Transfer: Yes  Transfer 1  Transfer From 1: Bed to  Transfer to 1: Stand  Technique 1: Sit to stand  Transfer Device 1: Walker  Transfer Level of Assistance 1: Contact guard, Minimal verbal cues  Trials/Comments 1: cueing for hand placement on AD  Transfers 2  Transfer From 2: Stand to  Transfer to 2: Chair with arms  Technique 2: Stand to sit, Stand pivot  Transfer Device 2: Walker  Transfer Level of Assistance 2: Contact guard, Minimal verbal cues  Trials/Comments 2: cueing for hand placement on AD and sequencing stand <>sit  Modalities:  Modalities Used: No  IADL's:   Current License: Yes  Occupation: Part time employment  Type of Occupation: Works at [employer]  Vision: Vision - Basic Assessment  Current Vision: No visual deficits  Sensation:  Light Touch: No apparent deficits  Strength:  Strength Comments: Cj UE strength WFL evidenced during functional transfers  Perception:  Inattention/Neglect: Appears intact  Coordination:  Movements are Fluid and Coordinated: Yes   Hand Function:  Hand Function  Gross Grasp: Functional  Coordination: Functional  Extremities: RUE   RUE : Within Functional Limits and LUE   LUE: Within Functional Limits      Outcome Measures: [facility] Daily Activity  Putting on and taking off  regular lower body clothing: A little  Bathing (including washing, rinsing, drying): A little  Putting on and taking off regular upper body clothing: None  Toileting, which includes using toilet, bedpan or urinal: A little  Taking care of personal grooming such as brushing teeth: A little  Eating Meals: None  Daily Activity - Total Score: 20         and OT Adult Other Outcome Measures  4AT: Negative  Education Documentation  Precautions, taught by RUBEN Allred at 11/20/2023  9:16 AM.  Learner: Patient  Readiness: Acceptance  Method: Explanation  Response: Verbalizes Understanding    ADL Training, taught by RUBEN Allred at 11/20/2023  9:16 AM.  Learner: Patient  Readiness: Acceptance  Method: Explanation  Response: Verbalizes Understanding    Education Comments  No comments found.      Goals:   Encounter Problems       Encounter Problems (Active)       ADLs       Patient will perform UB and LB bathing with modified independent level of assistance and shower chair and long-handled sponge.       Start:  11/20/23    Expected End:  12/04/23            Patient with complete lower body dressing with modified independent level of assistance donning and doffing all LE clothes  with PRN adaptive equipment while edge of bed.       Start:  11/20/23    Expected End:  12/04/23            Patient will complete toileting including hygiene clothing management/hygiene with modified independent level of assistance.       Start:  11/20/23    Expected End:  12/04/23               MOBILITY       Patient will perform Functional mobility min Household distances/Community Distances with independent level of assistance and least restrictive device in order to improve safety and functional mobility.       Start:  11/20/23    Expected End:  12/04/23               TRANSFERS       Patient will perform bed mobility independent level of assistance in order to improve safety and independence with mobility       Start:   11/20/23    Expected End:  12/04/23            Patient will complete functional transfer to chair with least restrictive device with independent level of assistance.       Start:  11/20/23    Expected End:  12/04/23

## 2023-11-20 NOTE — CARE PLAN
The patient's goals for the shift include      The clinical goals for the shift include patient will have managed pain during shift

## 2023-11-20 NOTE — NURSING NOTE
RN spoke with team and was notified that patient is medically ready for discharge. RN spoke with patient about plan of care and patient seems very hesitant about home going. RN expressed concern to team and MD and RN at bedside spoke with patient about medication regimen at home and that PT will also be at her house to help with recovery. Patient still expressed worry about discharge but team still states that patient is ready for discharge. RN notifying meds to beds to deliver medication. AVS to be reviewed with patient. Awaiting med delivery

## 2023-11-21 NOTE — DISCHARGE SUMMARY
Discharge Diagnosis  Tibia and fibula open fracture, right, sequela    Issues Requiring Follow-Up  Tibia/fibula fracture, open requiring operative fixation     Test Results Pending At Discharge  Pending Labs       No current pending labs.            Hospital Course  33 year-old female who presented with right open tibia/fibula fracture with associated posterior malleolus fracture. Patient is now s/p right tibia IMN on 11/19 by Dr. Guerra. On the day of surgery, patient was identified in the pre-operative holding area and agreeable to proceed with surgery. Written consent was obtained.  Please see operative note for further details of this procedure. Patient received 24 hours of thelma-operative antibiotics. Patient recovered in the PACU before transfer to a regular nursing floor. Patient was started on oxycodone, tylenol, and breakthrough dilaudid for pain control and ASA 81 mg bid for DVT prophylaxis. Physical therapy recommended continued recovery at home with continued physical therapy and wound care. On the day of discharge, patient was afebrile with stable vital signs. Patient was neurovascularly intact at time of discharge. Patient was discharged with prescription of ASA 81 mg bid for DVT prophylaxis for 4 weeks. Patient will follow-up with Dr. Guerra in 2 weeks for post-operative visit.      Pertinent Physical Exam At Time of Discharge      Home Medications     Medication List      START taking these medications     aspirin 81 mg EC tablet; Take 1 tablet (81 mg) by mouth 2 times a day   for 28 days.   calcium carbonate-vitamin D3 600 mg-10 mcg (400 unit) tablet; Take 1   tablet by mouth 2 times a day.   docusate sodium 100 mg capsule; Commonly known as: Colace; Take 1   capsule (100 mg) by mouth 2 times a day as needed for constipation for up   to 7 days.   oxyCODONE 5 mg immediate release tablet; Commonly known as: Roxicodone;   Take 1 tablet (5 mg) by mouth every 6 hours if needed for severe pain (7 -   10)  for up to 7 days.     CONTINUE taking these medications     Liletta 20.4 mcg/24 hrs (8 yrs) 52 mg intrauterine device; Generic drug:   levonorgestreL   loratadine 10 mg tablet; Commonly known as: Claritin   NON FORMULARY   pantoprazole 40 mg EC tablet; Commonly known as: ProtoNix   simethicone 80 mg chewable tablet; Commonly known as: Mylicon       Outpatient Follow-Up  Future Appointments   Date Time Provider Department Center   12/5/2023 10:15 AM Kimberly Escobar PA-C [id number] [facility]       Michael Rutledge DO

## 2023-11-22 ENCOUNTER — HOSPITAL ENCOUNTER (OUTPATIENT)
Dept: CARDIOLOGY | Facility: HOSPITAL | Age: 33
Discharge: HOME | End: 2023-11-22
Payer: COMMERCIAL

## 2023-11-22 LAB
ATRIAL RATE: 84 BPM
P AXIS: 74 DEGREES
P OFFSET: 181 MS
P ONSET: 134 MS
PR INTERVAL: 178 MS
Q ONSET: 223 MS
QRS COUNT: 14 BEATS
QRS DURATION: 84 MS
QT INTERVAL: 364 MS
QTC CALCULATION(BAZETT): 430 MS
QTC FREDERICIA: 407 MS
R AXIS: 18 DEGREES
T AXIS: 66 DEGREES
T OFFSET: 405 MS
VENTRICULAR RATE: 84 BPM

## 2023-11-22 PROCEDURE — 93005 ELECTROCARDIOGRAM TRACING: CPT

## 2023-11-24 DIAGNOSIS — S82.401B TIBIA/FIBULA FRACTURE, RIGHT, OPEN TYPE I OR II, INITIAL ENCOUNTER: ICD-10-CM

## 2023-11-24 DIAGNOSIS — G89.18 ACUTE POST-OPERATIVE PAIN: ICD-10-CM

## 2023-11-24 DIAGNOSIS — S82.201B TIBIA/FIBULA FRACTURE, RIGHT, OPEN TYPE I OR II, INITIAL ENCOUNTER: ICD-10-CM

## 2023-11-24 RX ORDER — OXYCODONE HYDROCHLORIDE 5 MG/1
5 TABLET ORAL EVERY 6 HOURS PRN
Qty: 28 TABLET | Refills: 0 | OUTPATIENT
Start: 2023-11-24 | End: 2023-12-01

## 2023-11-30 ENCOUNTER — HOME CARE VISIT (OUTPATIENT)
Dept: HOME HEALTH SERVICES | Facility: HOME HEALTH | Age: 33
End: 2023-11-30
Payer: COMMERCIAL

## 2023-11-30 PROCEDURE — 0023 HH SOC

## 2023-11-30 PROCEDURE — G0151 HHCP-SERV OF PT,EA 15 MIN: HCPCS

## 2023-11-30 ASSESSMENT — GAIT ASSESSMENTS
STEP SYMMETRY: 0 - RIGHT AND LEFT STEP LENGTH NOT EQUAL
WALKING STANCE: 0 - HEELS APART
INITIATION OF GAIT IMMEDIATELY AFTER GO: 0 - ANY HESITANCY OR MULTIPLE ATTEMPTS TO START
TRUNK SCORE: 1
PATH: 1 - MILD/MODERATE DEVIATION OR USES WALKING AID
STEP CONTINUITY: 0 - STOPPING OR DISCONTINUITY BETWEEN STEPS
GAIT SCORE: 4
TRUNK: 1 - NO SWAY BUT FLEXION OF KNEES OR BACK OR SPREADS ARMS WHILE WALKING
PATH SCORE: 1
BALANCE AND GAIT SCORE: 11

## 2023-11-30 ASSESSMENT — BALANCE ASSESSMENTS
STANDING BALANCE: 1 - STEADY BUT WIDE STANCE AND USES CANE OR OTHER SUPPORT
TURNING 360 DEGREES STEPS: 0 - DISCONTINUOUS STEPS
SITTING BALANCE: 1 - STEADY, SAFE
SITTING DOWN: 1 - USES ARMS OR NOT SMOOTH MOTION
ARISING SCORE: 1
NUDGED: 0 - BEGINS TO FALL
ARISES: 1 - ABLE, USES ARMS TO HELP
ATTEMPTS TO ARISE: 1 - ABLE, REQUIRES MORE THAN ONE ATTEMPT
IMMEDIATE STANDING BALANCE FIRST 5 SECONDS: 1 - STEADY BUT USES WALKER OR OTHER SUPPORT
NUDGED SCORE: 0
BALANCE SCORE: 7
EYES CLOSED AT MAXIMUM POSITION NUDGED: 1 - STEADY

## 2023-11-30 ASSESSMENT — ACTIVITIES OF DAILY LIVING (ADL)
ENTERING_EXITING_HOME: MODERATE ASSIST
CURRENT_FUNCTION: MODERATE ASSIST
AMBULATION_DISTANCE/DURATION_TOLERATED: 20 FT
AMBULATION ASSISTANCE ON FLAT SURFACES: 1
AMBULATION ASSISTANCE: 1
CURRENT_FUNCTION: STAND BY ASSIST
PHYSICAL_TRANSFERS_DEVICES: BODY SUPPORT
AMBULATION ASSISTANCE: MODERATE ASSIST
AMBULATION ASSISTANCE: STAND BY ASSIST
PHYSICAL TRANSFERS ASSESSED: 1

## 2023-11-30 ASSESSMENT — ENCOUNTER SYMPTOMS
LOWEST PAIN SEVERITY IN PAST 24 HOURS: 6/10
PAIN LOCATION - PAIN SEVERITY: 9/10
PAIN LOCATION: RIGHT LEG
PAIN SEVERITY GOAL: 3/10
LIMITED RANGE OF MOTION: 1
MUSCLE WEAKNESS: 1
HIGHEST PAIN SEVERITY IN PAST 24 HOURS: 9/10
PAIN: 1
SUBJECTIVE PAIN PROGRESSION: UNCHANGED

## 2023-12-01 VITALS — TEMPERATURE: 99.1 F | HEART RATE: 112 BPM | RESPIRATION RATE: 18 BRPM | OXYGEN SATURATION: 97 %

## 2023-12-01 SDOH — HEALTH STABILITY: PHYSICAL HEALTH: EXERCISE TYPE: KNEE POST-SURGERY PROTOCOL

## 2023-12-01 SDOH — HEALTH STABILITY: PHYSICAL HEALTH: EXERCISE ACTIVITY: KNEE POST-SURGERY PROTOCOL

## 2023-12-01 SDOH — HEALTH STABILITY: PHYSICAL HEALTH: RESISTANCE: PROGRESS AS TOLERATED

## 2023-12-01 SDOH — HEALTH STABILITY: PHYSICAL HEALTH: PHYSICAL EXERCISE: SUPINE, SITTING, STANDING

## 2023-12-01 SDOH — HEALTH STABILITY: PHYSICAL HEALTH: EXERCISE ACTIVITY: GAIT AND BALANCE TRAINING

## 2023-12-01 ASSESSMENT — ENCOUNTER SYMPTOMS: PAIN LOCATION - RELIEVING FACTORS: PAIN MEDS

## 2023-12-05 ENCOUNTER — OFFICE VISIT (OUTPATIENT)
Dept: ORTHOPEDIC SURGERY | Facility: CLINIC | Age: 33
End: 2023-12-05
Payer: COMMERCIAL

## 2023-12-05 ENCOUNTER — ANCILLARY PROCEDURE (OUTPATIENT)
Dept: RADIOLOGY | Facility: CLINIC | Age: 33
End: 2023-12-05
Payer: COMMERCIAL

## 2023-12-05 DIAGNOSIS — S82.401S FRACTURE OF TIBIA WITH FIBULA, RIGHT, OPEN, SEQUELA: Primary | ICD-10-CM

## 2023-12-05 DIAGNOSIS — S82.201S FRACTURE OF TIBIA WITH FIBULA, RIGHT, OPEN, SEQUELA: Primary | ICD-10-CM

## 2023-12-05 DIAGNOSIS — S82.201S FRACTURE OF TIBIA WITH FIBULA, RIGHT, OPEN, SEQUELA: ICD-10-CM

## 2023-12-05 DIAGNOSIS — S82.401S FRACTURE OF TIBIA WITH FIBULA, RIGHT, OPEN, SEQUELA: ICD-10-CM

## 2023-12-05 PROCEDURE — 73590 X-RAY EXAM OF LOWER LEG: CPT | Mod: RIGHT SIDE | Performed by: STUDENT IN AN ORGANIZED HEALTH CARE EDUCATION/TRAINING PROGRAM

## 2023-12-05 PROCEDURE — 73590 X-RAY EXAM OF LOWER LEG: CPT | Mod: RT

## 2023-12-05 PROCEDURE — 99024 POSTOP FOLLOW-UP VISIT: CPT | Performed by: PHYSICIAN ASSISTANT

## 2023-12-05 PROCEDURE — L4361 PNEUMA/VAC WALK BOOT PRE OTS: HCPCS | Performed by: PHYSICIAN ASSISTANT

## 2023-12-05 RX ORDER — GABAPENTIN 300 MG/1
300 CAPSULE ORAL 3 TIMES DAILY
Qty: 90 CAPSULE | Refills: 0 | Status: SHIPPED | OUTPATIENT
Start: 2023-12-05 | End: 2024-01-03 | Stop reason: SDUPTHER

## 2023-12-05 RX ORDER — OXYCODONE AND ACETAMINOPHEN 5; 325 MG/1; MG/1
1 TABLET ORAL EVERY 6 HOURS
Qty: 28 TABLET | Refills: 0 | Status: SHIPPED | OUTPATIENT
Start: 2023-12-05 | End: 2023-12-12

## 2023-12-05 ASSESSMENT — PAIN - FUNCTIONAL ASSESSMENT: PAIN_FUNCTIONAL_ASSESSMENT: 0-10

## 2023-12-05 ASSESSMENT — PAIN SCALES - GENERAL: PAINLEVEL_OUTOF10: 8

## 2023-12-05 ASSESSMENT — PAIN DESCRIPTION - DESCRIPTORS: DESCRIPTORS: ACHING;THROBBING;SORE

## 2023-12-05 NOTE — PROGRESS NOTES
History of Present Illness   William Moya is a 33 y.o. female presenting today for post-op check from IMN open R tib/fib fx on 11/19/23. Overall doing ok. Has mild pain that is requiring narcotics for pain control. Rates pain a 7/10. Incisions are well healing without redness or drainage. Compliant with WB recommendations. Denies numbness, tingling, f/c, CP, SOB or any other complaints or concerns.      Past Medical History:   Diagnosis Date    Disease of stomach and duodenum, unspecified     Stomach problems    Encounter for gynecological examination (general) (routine) without abnormal findings 02/12/2016    Well woman exam    Headache, unspecified 12/30/2013    Headache    Other conditions influencing health status     Menstruation    Other conditions influencing health status     History of pregnancy    Presence of (intrauterine) contraceptive device     IUD contraception       Medication Documentation Review Audit       Reviewed by Kimberly Escobar PA-C (Physician Assistant) on 12/05/23 at 1217      Medication Order Taking? Sig Documenting Provider Last Dose Status   aspirin 81 mg EC tablet [id number] No Take 1 tablet (81 mg) by mouth 2 times a day for 28 days.   Patient not taking: Reported on 12/5/2023    Gonzalo Washington MD Not Taking Active   calcium carbonate-vitamin D3 600 mg-10 mcg (400 unit) tablet [id number] Yes Take 1 tablet by mouth 2 times a day. Gonzalo Washington MD Taking Active   docusate sodium (Colace) 100 mg capsule [id number] No Take 1 capsule (100 mg) by mouth 2 times a day. Historical Provider, MD Not Taking Active   levonorgestreL (Liletta) 20.4 mcg/24 hrs (8 yrs) 52 mg intrauterine device [id number] No 52 mg by intrauterine route once daily. Historical Provider, MD Not Taking Active   loratadine (Claritin) 10 mg tablet [id number] No Take 1 tablet (10 mg) by mouth once daily. Pita Bennett MD Not Taking Active   NON FORMULARY [id number]  Take 1 each by mouth once daily. Tumeric suppliment  Historical Provider, MD  Active   oxyCODONE (Roxicodone) 5 mg immediate release tablet [id number]  Take 5 mg by mouth every 6 hours. Indications: pain Historical Provider, MD  Active   pantoprazole (ProtoNix) 40 mg EC tablet [id number]  Take 1 tablet by mouth once daily. Historical Provider, MD  Active   simethicone (Mylicon) 80 mg chewable tablet [id number]  Chew 1 tablet every 6 hours if needed for flatulence. Historical Provider, MD  Active                    Allergies   Allergen Reactions    Latex Unknown       Social History     Socioeconomic History    Marital status:      Spouse name: Not on file    Number of children: Not on file    Years of education: Not on file    Highest education level: Not on file   Occupational History    Not on file   Tobacco Use    Smoking status: Some Days     Types: Cigarettes    Smokeless tobacco: Not on file   Vaping Use    Vaping Use: Never used   Substance and Sexual Activity    Alcohol use: Not Currently    Drug use: Never    Sexual activity: Not on file   Other Topics Concern    Not on file   Social History Narrative    Not on file     Social Determinants of Health     Financial Resource Strain: Low Risk  (11/19/2023)    Overall Financial Resource Strain (CARDIA)     Difficulty of Paying Living Expenses: Not hard at all   Food Insecurity: No Food Insecurity (11/19/2023)    Hunger Vital Sign     Worried About Running Out of Food in the Last Year: Never true     Ran Out of Food in the Last Year: Never true   Transportation Needs: No Transportation Needs (11/30/2023)    OASIS : Transportation     Lack of Transportation (Medical): No     Lack of Transportation (Non-Medical): No     Patient Unable or Declines to Respond: No   Physical Activity: Insufficiently Active (11/19/2023)    Exercise Vital Sign     Days of Exercise per Week: 4 days     Minutes of Exercise per Session: 30 min   Stress: No Stress Concern Present (11/19/2023)    [employer] - Occupational Stress Questionnaire     Feeling of Stress : Not at all   Social Connections: Feeling Socially Integrated (11/30/2023)    OASIS : Social Isolation     Frequency of experiencing loneliness or isolation: Never   Recent Concern: Social Connections - Socially Isolated (11/19/2023)    Social Connection and Isolation Panel [NHANES]     Frequency of Communication with Friends and Family: Once a week     Frequency of Social Gatherings with Friends and Family: Once a week     Attends Judaism Services: Never     Active Member of Clubs or Organizations: No     Attends Club or Organization Meetings: Never     Marital Status:    Intimate Partner Violence: Not At Risk (11/19/2023)    Humiliation, Afraid, Rape, and Kick questionnaire     Fear of Current or Ex-Partner: No     Emotionally Abused: No     Physically Abused: No     Sexually Abused: No   Housing Stability: Low Risk  (11/19/2023)    Housing Stability Vital Sign     Unable to Pay for Housing in the Last Year: No     Number of Places Lived in the Last Year: 2     Unstable Housing in the Last Year: No       History reviewed. No pertinent surgical history.       Review of Systems:  30 point ROS reviewed and negative other than as listed in the HPI     Physical Exam:  Gen: The pt is A&Ox3, NAD, and appear state age and weight  Psychiatric: mood and affect are appropriate   Eyes: sclera are white, EOM grossly intact  ENT: MMM  Neck: supple, thyroid is midline  Respiratory: respirations are nonlabored, chest rise symmetric  CV: rate is regular by palpation of distal pulses  Abdomen: nondistended   Integument: no obvious cutaneous lesions noted. No signs of lymphangitis. No signs of systemic edema.   MSK:  RLE  surgical incision well healing without edema, erythema, drainage, or other s/sx of infection. Appropriately tender to palpation around the incision. SILT throughout the leg intact. Intact plantarflexion and dorsiflexion. Foot warm and  well perfused.      Imaging:  I personally reviewed multiple views of the  R tib/fib  were obtained in the office today demonstrate maintenance of reduction, interval healing, and a stable position of the hardware.      Assessment   33 y.o. female post-op from IMN open R tib/fib fx on 11/19/23.     Plan:  Continue WBAT on  RLE. Will fit her for a boot to wear for support as needed with WB. She can do ROM as tolerated.   Incisions well healing; sutures/staples removed in office and steri's placed with wound care instructed   Continue DVT ppx and vit d/calcium for bone health   Pt requesting refill of narcotics. OARSS was reviewed and not concerning for signs of abuse thus in setting of acute post-operative period patient provided a refill after counseling on risk of addiction.      Patient was prescribed a CAM boot for  RLE fx  injury. This mobility device is required for the following reasons:     1. The patient has a mobility limitation that significantly impairs their ability to participate in one or more mobility-related activities of daily living (MRADL) in the home; and  2. The patient is able to safely use the mobility device; and  3. The functional mobility deficit can be sufficiently resolved with use of the mobility device     Verbal and written instructions for the use, wear schedule, cleaning and application of this item were given. Education provided also included gait training and safety precautions when using this device. Patient was instructed that should the item result in increased pain, decreased sensation, increased swelling, or an overall worsening of their medical condition, to please contact our office immediately.      Follow-up 1 month with 2 views right tib/fib.     All of the patient's questions/concerns address and they are in agreement with the plan.

## 2023-12-06 ENCOUNTER — HOME CARE VISIT (OUTPATIENT)
Dept: HOME HEALTH SERVICES | Facility: HOME HEALTH | Age: 33
End: 2023-12-06
Payer: COMMERCIAL

## 2023-12-06 PROCEDURE — G0151 HHCP-SERV OF PT,EA 15 MIN: HCPCS

## 2023-12-06 SDOH — HEALTH STABILITY: PHYSICAL HEALTH: EXERCISE ACTIVITY: QUAD SETS

## 2023-12-06 SDOH — HEALTH STABILITY: PHYSICAL HEALTH: PHYSICAL EXERCISE: 20

## 2023-12-06 SDOH — HEALTH STABILITY: PHYSICAL HEALTH: EXERCISE ACTIVITIES SETS: 1

## 2023-12-06 SDOH — HEALTH STABILITY: PHYSICAL HEALTH: PHYSICAL EXERCISE: 10

## 2023-12-06 SDOH — HEALTH STABILITY: PHYSICAL HEALTH: EXERCISE ACTIVITY: ANKLE EVERSION

## 2023-12-06 SDOH — HEALTH STABILITY: PHYSICAL HEALTH: EXERCISE ACTIVITY: ANKLE PF AND DF

## 2023-12-06 SDOH — HEALTH STABILITY: PHYSICAL HEALTH: EXERCISE ACTIVITY: ANKLE INVERSION

## 2023-12-06 SDOH — HEALTH STABILITY: PHYSICAL HEALTH: PHYSICAL EXERCISE: SUPINE

## 2023-12-06 SDOH — HEALTH STABILITY: PHYSICAL HEALTH: EXERCISE ACTIVITY: HEELSLIDES

## 2023-12-06 SDOH — HEALTH STABILITY: PHYSICAL HEALTH: EXERCISE ACTIVITY: LAQS

## 2023-12-06 SDOH — HEALTH STABILITY: PHYSICAL HEALTH: PHYSICAL EXERCISE: SITTING

## 2023-12-06 SDOH — HEALTH STABILITY: PHYSICAL HEALTH: EXERCISE ACTIVITY: ANKLE CIRCLES

## 2023-12-06 SDOH — HEALTH STABILITY: PHYSICAL HEALTH: EXERCISE TYPE: RIGHT ANKLE AND KNEE ROM AND STRETCHING ACTIVITIES

## 2023-12-06 SDOH — ECONOMIC STABILITY: HOUSING INSECURITY: OPEN FLAME PRESENT: 1

## 2023-12-06 SDOH — ECONOMIC STABILITY: HOUSING INSECURITY
HOME SAFETY: THERAPIST DISCUSSED WITH PATIENT AND CAREGIVER IMPORTANCE OF HOME SAFETY, SPECIFICALLY FOR CLUTTER FREE WALKING PATHWAYS AND ADEUQATE LIGHTING

## 2023-12-06 ASSESSMENT — ENCOUNTER SYMPTOMS
PAIN LOCATION - PAIN SEVERITY: 7/10
PAIN LOCATION - PAIN FREQUENCY: FREQUENT
MUSCLE WEAKNESS: 1
HIGHEST PAIN SEVERITY IN PAST 24 HOURS: 8/10
PAIN LOCATION: RIGHT ANKLE
OCCASIONAL FEELINGS OF UNSTEADINESS: 1
PAIN SEVERITY GOAL: 3/10
PERSON REPORTING PAIN: PATIENT
LOWEST PAIN SEVERITY IN PAST 24 HOURS: 5/10
PAIN LOCATION - RELIEVING FACTORS: PAIN MEDS, ICE
ARTHRALGIAS: 1
LOSS OF SENSATION IN FEET: 1
PAIN: 1
PAIN LOCATION - EXACERBATING FACTORS: ROM
DEPRESSION: 0

## 2023-12-06 ASSESSMENT — ACTIVITIES OF DAILY LIVING (ADL)
AMBULATION ASSISTANCE: STAND BY ASSIST
AMBULATION ASSISTANCE: ONE PERSON
AMBULATION ASSISTANCE: 1
PHYSICAL TRANSFERS ASSESSED: 1
OASIS_M1830: 05
CURRENT_FUNCTION: SUPERVISION
AMBULATION ASSISTANCE ON FLAT SURFACES: 1
CURRENT_FUNCTION: ONE PERSON

## 2023-12-08 ENCOUNTER — HOME CARE VISIT (OUTPATIENT)
Dept: HOME HEALTH SERVICES | Facility: HOME HEALTH | Age: 33
End: 2023-12-08
Payer: COMMERCIAL

## 2023-12-11 ENCOUNTER — HOME CARE VISIT (OUTPATIENT)
Dept: HOME HEALTH SERVICES | Facility: HOME HEALTH | Age: 33
End: 2023-12-11
Payer: COMMERCIAL

## 2023-12-11 PROCEDURE — G0151 HHCP-SERV OF PT,EA 15 MIN: HCPCS

## 2023-12-11 SDOH — HEALTH STABILITY: PHYSICAL HEALTH: PHYSICAL EXERCISE: 10

## 2023-12-11 SDOH — ECONOMIC STABILITY: HOUSING INSECURITY: OPEN FLAME PRESENT: 1

## 2023-12-11 SDOH — HEALTH STABILITY: PHYSICAL HEALTH: EXERCISE TYPE: RIGHT ANKLE ROM

## 2023-12-11 SDOH — HEALTH STABILITY: PHYSICAL HEALTH: PHYSICAL EXERCISE: SUPINE

## 2023-12-11 SDOH — HEALTH STABILITY: PHYSICAL HEALTH: EXERCISE ACTIVITIES SETS: 2

## 2023-12-11 SDOH — HEALTH STABILITY: PHYSICAL HEALTH: EXERCISE ACTIVITY: ANKLE ROM

## 2023-12-11 SDOH — HEALTH STABILITY: PHYSICAL HEALTH
EXERCISE COMMENTS: ANKLE ROM ACTIVITIES INCLUDE PLANTARFLEXION AND DORSIFLEXION, EVERSION AND INVERSION, ANKLE CIRCLES, AND ANKLE ALPHABETS

## 2023-12-11 ASSESSMENT — ACTIVITIES OF DAILY LIVING (ADL)
AMBULATION ASSISTANCE: STAND BY ASSIST
AMBULATION ASSISTANCE: 1
CURRENT_FUNCTION: INDEPENDENT
PHYSICAL TRANSFERS ASSESSED: 1
AMBULATION ASSISTANCE: ONE PERSON
AMBULATION_DISTANCE/DURATION_TOLERATED: 100 FEET WITH SUPERVISION AND NO DEVICE
AMBULATION ASSISTANCE ON FLAT SURFACES: 1

## 2023-12-11 ASSESSMENT — ENCOUNTER SYMPTOMS
PAIN LOCATION - PAIN FREQUENCY: FREQUENT
OCCASIONAL FEELINGS OF UNSTEADINESS: 1
PAIN SEVERITY GOAL: 3/10
LOWEST PAIN SEVERITY IN PAST 24 HOURS: 6/10
HIGHEST PAIN SEVERITY IN PAST 24 HOURS: 10/10
PAIN LOCATION - PAIN SEVERITY: 8/10
LIMITED RANGE OF MOTION: 1
DEPRESSION: 0
PAIN LOCATION: RIGHT LEG
MUSCLE WEAKNESS: 1
LOSS OF SENSATION IN FEET: 1
SUBJECTIVE PAIN PROGRESSION: GRADUALLY WORSENING
PAIN LOCATION - RELIEVING FACTORS: NOTHING
PERSON REPORTING PAIN: PATIENT
PAIN: 1

## 2023-12-15 ENCOUNTER — HOME CARE VISIT (OUTPATIENT)
Dept: HOME HEALTH SERVICES | Facility: HOME HEALTH | Age: 33
End: 2023-12-15
Payer: COMMERCIAL

## 2023-12-15 ENCOUNTER — PATIENT MESSAGE (OUTPATIENT)
Dept: ORTHOPEDIC SURGERY | Facility: CLINIC | Age: 33
End: 2023-12-15
Payer: COMMERCIAL

## 2023-12-15 DIAGNOSIS — S82.401S FRACTURE OF TIBIA WITH FIBULA, RIGHT, OPEN, SEQUELA: Primary | ICD-10-CM

## 2023-12-15 DIAGNOSIS — S82.201S FRACTURE OF TIBIA WITH FIBULA, RIGHT, OPEN, SEQUELA: Primary | ICD-10-CM

## 2023-12-15 PROCEDURE — G0151 HHCP-SERV OF PT,EA 15 MIN: HCPCS

## 2023-12-15 SDOH — HEALTH STABILITY: PHYSICAL HEALTH: EXERCISE ACTIVITY: ANKLE INVERSION

## 2023-12-15 SDOH — HEALTH STABILITY: PHYSICAL HEALTH: PHYSICAL EXERCISE: 5

## 2023-12-15 SDOH — HEALTH STABILITY: PHYSICAL HEALTH: PHYSICAL EXERCISE: SITTING

## 2023-12-15 SDOH — HEALTH STABILITY: PHYSICAL HEALTH: EXERCISE ACTIVITY: ANKLE EVERSION

## 2023-12-15 SDOH — HEALTH STABILITY: PHYSICAL HEALTH: EXERCISE TYPE: RIGHT ANKLE ROM

## 2023-12-15 SDOH — HEALTH STABILITY: PHYSICAL HEALTH: EXERCISE ACTIVITIES SETS: 2

## 2023-12-15 SDOH — HEALTH STABILITY: PHYSICAL HEALTH: EXERCISE ACTIVITY: SEATED ANKLE PF

## 2023-12-15 SDOH — HEALTH STABILITY: PHYSICAL HEALTH: EXERCISE ACTIVITY: ANKLE DF

## 2023-12-15 SDOH — ECONOMIC STABILITY: HOUSING INSECURITY: OPEN FLAME PRESENT: 1

## 2023-12-15 SDOH — HEALTH STABILITY: PHYSICAL HEALTH: EXERCISE ACTIVITY: ANKLE CIRCLES

## 2023-12-15 ASSESSMENT — ACTIVITIES OF DAILY LIVING (ADL)
AMBULATION ASSISTANCE: 1
CURRENT_FUNCTION: INDEPENDENT
AMBULATION ASSISTANCE: STAND BY ASSIST
AMBULATION ASSISTANCE: ONE PERSON
AMBULATION ASSISTANCE ON FLAT SURFACES: 1
PHYSICAL TRANSFERS ASSESSED: 1

## 2023-12-15 ASSESSMENT — ENCOUNTER SYMPTOMS
LOWEST PAIN SEVERITY IN PAST 24 HOURS: 6/10
MUSCLE WEAKNESS: 1
PAIN LOCATION - PAIN SEVERITY: 8/10
PAIN: 1
PERSON REPORTING PAIN: PATIENT
PAIN LOCATION - EXACERBATING FACTORS: ROM
OCCASIONAL FEELINGS OF UNSTEADINESS: 1
LIMITED RANGE OF MOTION: 1
PAIN LOCATION - RELIEVING FACTORS: GABAPENTIN
PAIN LOCATION: RIGHT ANKLE
LOSS OF SENSATION IN FEET: 1
HIGHEST PAIN SEVERITY IN PAST 24 HOURS: 8/10
SUBJECTIVE PAIN PROGRESSION: UNCHANGED
PAIN LOCATION - PAIN FREQUENCY: FREQUENT
PAIN SEVERITY GOAL: 3/10
DEPRESSION: 0
ARTHRALGIAS: 1

## 2023-12-17 NOTE — TELEPHONE ENCOUNTER
From: William Moya  To: Kimberly Escobar PA-C  Sent: 12/15/2023 4:04 PM EST  Subject: Pain control plan    Good evening, I'm reaching out concerning a prescription refill, my physical therapist Kvng has also been calling & leaving messages to discuss my pain level interfering our therapy sessions. If you could reach out to me as soon as possible.

## 2023-12-18 DIAGNOSIS — S82.401S FRACTURE OF TIBIA WITH FIBULA, RIGHT, OPEN, SEQUELA: ICD-10-CM

## 2023-12-18 DIAGNOSIS — S82.201S FRACTURE OF TIBIA WITH FIBULA, RIGHT, OPEN, SEQUELA: ICD-10-CM

## 2023-12-18 RX ORDER — OXYCODONE AND ACETAMINOPHEN 5; 325 MG/1; MG/1
1 TABLET ORAL EVERY 6 HOURS
Qty: 28 TABLET | Refills: 0 | Status: SHIPPED | OUTPATIENT
Start: 2023-12-18 | End: 2023-12-18 | Stop reason: SDUPTHER

## 2023-12-18 RX ORDER — OXYCODONE AND ACETAMINOPHEN 5; 325 MG/1; MG/1
1 TABLET ORAL EVERY 6 HOURS
Qty: 28 TABLET | Refills: 0 | Status: SHIPPED | OUTPATIENT
Start: 2023-12-18 | End: 2023-12-25

## 2023-12-18 RX ORDER — CYCLOBENZAPRINE HCL 10 MG
10 TABLET ORAL 3 TIMES DAILY PRN
Qty: 21 TABLET | Refills: 0 | Status: SHIPPED | OUTPATIENT
Start: 2023-12-18 | End: 2024-01-03 | Stop reason: SDUPTHER

## 2023-12-20 ENCOUNTER — HOME CARE VISIT (OUTPATIENT)
Dept: HOME HEALTH SERVICES | Facility: HOME HEALTH | Age: 33
End: 2023-12-20
Payer: COMMERCIAL

## 2023-12-20 PROCEDURE — G0157 HHC PT ASSISTANT EA 15: HCPCS

## 2023-12-22 ENCOUNTER — HOME CARE VISIT (OUTPATIENT)
Dept: HOME HEALTH SERVICES | Facility: HOME HEALTH | Age: 33
End: 2023-12-22
Payer: COMMERCIAL

## 2023-12-29 ENCOUNTER — HOME CARE VISIT (OUTPATIENT)
Dept: HOME HEALTH SERVICES | Facility: HOME HEALTH | Age: 33
End: 2023-12-29
Payer: COMMERCIAL

## 2023-12-29 PROCEDURE — G0151 HHCP-SERV OF PT,EA 15 MIN: HCPCS

## 2023-12-29 SDOH — HEALTH STABILITY: PHYSICAL HEALTH: EXERCISE ACTIVITY: RIGHT ANKLE PF

## 2023-12-29 SDOH — HEALTH STABILITY: PHYSICAL HEALTH: PHYSICAL EXERCISE: 10

## 2023-12-29 SDOH — HEALTH STABILITY: PHYSICAL HEALTH
EXERCISE COMMENTS: KNEE PROTOCOL INCLUDES THE FOLLOWING:  SUPINE- ANKLE PUMPS, QUAD SETS, GLUTEAL SETS, KNEE FLEXION, SLR, AND SAQS  SEATED- LAQS, HIP FLEXION, HEEL SLIDES  STANDING- CALF RAISES, KNEE FLEXION, MARCHING

## 2023-12-29 SDOH — HEALTH STABILITY: PHYSICAL HEALTH: EXERCISE ACTIVITY: RIGHT ANKLE DF

## 2023-12-29 SDOH — HEALTH STABILITY: PHYSICAL HEALTH: PHYSICAL EXERCISE: SITTING

## 2023-12-29 SDOH — ECONOMIC STABILITY: HOUSING INSECURITY: UNSAFE COOKING RANGE AREA: 1

## 2023-12-29 SDOH — HEALTH STABILITY: PHYSICAL HEALTH: EXERCISE ACTIVITIES SETS: 2

## 2023-12-29 SDOH — HEALTH STABILITY: PHYSICAL HEALTH: EXERCISE ACTIVITY: RIGHT KNEE PROTOCOL

## 2023-12-29 SDOH — HEALTH STABILITY: PHYSICAL HEALTH: EXERCISE ACTIVITY: RIGHT ANKLE INVERSION, EVERSION

## 2023-12-29 SDOH — HEALTH STABILITY: PHYSICAL HEALTH: EXERCISE TYPE: RIGHT ANKLE AND KNEE ROM ACTIVITIES

## 2023-12-29 SDOH — HEALTH STABILITY: PHYSICAL HEALTH: PHYSICAL EXERCISE: SUPINE SITTING STANDING

## 2023-12-29 ASSESSMENT — ENCOUNTER SYMPTOMS
LOWEST PAIN SEVERITY IN PAST 24 HOURS: 3/10
ARTHRALGIAS: 1
DEPRESSION: 0
PAIN LOCATION - PAIN SEVERITY: 4/10
HIGHEST PAIN SEVERITY IN PAST 24 HOURS: 6/10
PAIN LOCATION - RELIEVING FACTORS: ICE, PAIN MEDS
PAIN LOCATION - PAIN FREQUENCY: FREQUENT
PAIN SEVERITY GOAL: 3/10
PERSON REPORTING PAIN: PATIENT
MUSCLE WEAKNESS: 1
PAIN LOCATION - EXACERBATING FACTORS: PROLONGED STANDING, ROM
LOSS OF SENSATION IN FEET: 1
PAIN: 1
SUBJECTIVE PAIN PROGRESSION: GRADUALLY IMPROVING
LIMITED RANGE OF MOTION: 1
OCCASIONAL FEELINGS OF UNSTEADINESS: 1
PAIN LOCATION: RIGHT ANKLE

## 2023-12-29 ASSESSMENT — ACTIVITIES OF DAILY LIVING (ADL)
AMBULATION ASSISTANCE: 1
AMBULATION ASSISTANCE: STAND BY ASSIST
AMBULATION ASSISTANCE ON FLAT SURFACES: 1
PHYSICAL TRANSFERS ASSESSED: 1
AMBULATION ASSISTANCE: ONE PERSON
CURRENT_FUNCTION: INDEPENDENT

## 2024-01-03 ENCOUNTER — OFFICE VISIT (OUTPATIENT)
Dept: ORTHOPEDIC SURGERY | Facility: CLINIC | Age: 34
End: 2024-01-03
Payer: COMMERCIAL

## 2024-01-03 ENCOUNTER — ANCILLARY PROCEDURE (OUTPATIENT)
Dept: RADIOLOGY | Facility: CLINIC | Age: 34
End: 2024-01-03
Payer: COMMERCIAL

## 2024-01-03 ENCOUNTER — PATIENT MESSAGE (OUTPATIENT)
Dept: ORTHOPEDIC SURGERY | Facility: CLINIC | Age: 34
End: 2024-01-03
Payer: COMMERCIAL

## 2024-01-03 ENCOUNTER — HOME CARE VISIT (OUTPATIENT)
Dept: HOME HEALTH SERVICES | Facility: HOME HEALTH | Age: 34
End: 2024-01-03
Payer: COMMERCIAL

## 2024-01-03 VITALS — BODY MASS INDEX: 20.55 KG/M2 | HEIGHT: 63 IN | WEIGHT: 116 LBS

## 2024-01-03 DIAGNOSIS — S82.401S FRACTURE OF TIBIA WITH FIBULA, RIGHT, OPEN, SEQUELA: Primary | ICD-10-CM

## 2024-01-03 DIAGNOSIS — S82.401S FRACTURE OF TIBIA WITH FIBULA, RIGHT, OPEN, SEQUELA: ICD-10-CM

## 2024-01-03 DIAGNOSIS — S82.201S FRACTURE OF TIBIA WITH FIBULA, RIGHT, OPEN, SEQUELA: ICD-10-CM

## 2024-01-03 DIAGNOSIS — S82.201S FRACTURE OF TIBIA WITH FIBULA, RIGHT, OPEN, SEQUELA: Primary | ICD-10-CM

## 2024-01-03 PROCEDURE — 73590 X-RAY EXAM OF LOWER LEG: CPT | Mod: RT

## 2024-01-03 PROCEDURE — G0151 HHCP-SERV OF PT,EA 15 MIN: HCPCS

## 2024-01-03 PROCEDURE — 73590 X-RAY EXAM OF LOWER LEG: CPT | Mod: RIGHT SIDE | Performed by: RADIOLOGY

## 2024-01-03 PROCEDURE — 0023 HH SOC

## 2024-01-03 PROCEDURE — 99024 POSTOP FOLLOW-UP VISIT: CPT | Performed by: PHYSICIAN ASSISTANT

## 2024-01-03 RX ORDER — CYCLOBENZAPRINE HCL 10 MG
10 TABLET ORAL 3 TIMES DAILY PRN
Qty: 21 TABLET | Refills: 0 | Status: SHIPPED | OUTPATIENT
Start: 2024-01-03 | End: 2024-01-05 | Stop reason: SDUPTHER

## 2024-01-03 RX ORDER — GABAPENTIN 300 MG/1
300 CAPSULE ORAL 3 TIMES DAILY
Qty: 90 CAPSULE | Refills: 11 | Status: SHIPPED | OUTPATIENT
Start: 2024-01-03 | End: 2024-02-21

## 2024-01-03 SDOH — HEALTH STABILITY: PHYSICAL HEALTH: EXERCISE ACTIVITIES SETS: 2

## 2024-01-03 SDOH — HEALTH STABILITY: PHYSICAL HEALTH: PHYSICAL EXERCISE: 15

## 2024-01-03 SDOH — ECONOMIC STABILITY: HOUSING INSECURITY: OPEN FLAME PRESENT: 1

## 2024-01-03 SDOH — HEALTH STABILITY: PHYSICAL HEALTH: PHYSICAL EXERCISE: SITTING

## 2024-01-03 SDOH — HEALTH STABILITY: PHYSICAL HEALTH: PHYSICAL EXERCISE: STANDING

## 2024-01-03 SDOH — HEALTH STABILITY: PHYSICAL HEALTH: EXERCISE ACTIVITY: STANDING HEP

## 2024-01-03 SDOH — HEALTH STABILITY: PHYSICAL HEALTH: EXERCISE ACTIVITY: ANKLE ROM

## 2024-01-03 SDOH — HEALTH STABILITY: PHYSICAL HEALTH: EXERCISE TYPE: STANDING HEP

## 2024-01-03 SDOH — HEALTH STABILITY: PHYSICAL HEALTH
EXERCISE COMMENTS: STANDING HEP INCLUDES: CALF RAISES, KNEE FLEXION, HIP FLEXION, MARCHING, HIP ABDUCTION, HIP EXTENSION, AND MINI SQUATS  ANKLE ROM ACTIVITIES INCLUDE PLANTARFLEXION AND DORSIFLEXION, EVERSION AND INVERSION, ANKLE CIRCLES, AND ANKLE ALPHABETS

## 2024-01-03 ASSESSMENT — ACTIVITIES OF DAILY LIVING (ADL)
PHYSICAL TRANSFERS ASSESSED: 1
AMBULATION ASSISTANCE: SUPERVISION
AMBULATION ASSISTANCE ON FLAT SURFACES: 1
CURRENT_FUNCTION: INDEPENDENT
AMBULATION ASSISTANCE: ONE PERSON
AMBULATION ASSISTANCE: 1

## 2024-01-03 ASSESSMENT — ENCOUNTER SYMPTOMS
OCCASIONAL FEELINGS OF UNSTEADINESS: 1
DEPRESSION: 0
PERSON REPORTING PAIN: PATIENT
PAIN SEVERITY GOAL: 3/10
MUSCLE WEAKNESS: 1
PAIN LOCATION - EXACERBATING FACTORS: ROM
LIMITED RANGE OF MOTION: 1
SUBJECTIVE PAIN PROGRESSION: UNCHANGED
PAIN LOCATION: RIGHT ANKLE
PAIN LOCATION - PAIN FREQUENCY: FREQUENT
PAIN: PATIENT DILIGENT WITH PAIN CONTROL TECHNIQUES
PAIN: 1
LOWEST PAIN SEVERITY IN PAST 24 HOURS: 2/10
PAIN LOCATION - PAIN SEVERITY: 5/10
ARTHRALGIAS: 1
HIGHEST PAIN SEVERITY IN PAST 24 HOURS: 7/10
LOSS OF SENSATION IN FEET: 1

## 2024-01-03 ASSESSMENT — PAIN DESCRIPTION - DESCRIPTORS: DESCRIPTORS: ACHING;THROBBING;TENDER;SORE

## 2024-01-03 ASSESSMENT — PAIN - FUNCTIONAL ASSESSMENT: PAIN_FUNCTIONAL_ASSESSMENT: 0-10

## 2024-01-03 ASSESSMENT — PAIN SCALES - GENERAL: PAINLEVEL_OUTOF10: 6

## 2024-01-03 NOTE — TELEPHONE ENCOUNTER
From: William Moya  To: Kimberly Escobar PA-C  Sent: 1/3/2024 2:26 PM EST  Subject: Refill for cyclobenzaprine 10 mg tablet    Hi, I had an appointment 1/3/24 & I meant to ask for a refill for cyclobenzaprine 10 mg tablet if possible.

## 2024-01-05 ENCOUNTER — HOME CARE VISIT (OUTPATIENT)
Dept: HOME HEALTH SERVICES | Facility: HOME HEALTH | Age: 34
End: 2024-01-05
Payer: COMMERCIAL

## 2024-01-05 PROCEDURE — G0151 HHCP-SERV OF PT,EA 15 MIN: HCPCS

## 2024-01-05 RX ORDER — CYCLOBENZAPRINE HCL 10 MG
10 TABLET ORAL 3 TIMES DAILY PRN
Qty: 21 TABLET | Refills: 0 | Status: SHIPPED | OUTPATIENT
Start: 2024-01-05 | End: 2024-01-12

## 2024-01-05 SDOH — HEALTH STABILITY: PHYSICAL HEALTH
EXERCISE COMMENTS: KNEE PROTOCOL INCLUDES THE FOLLOWING:  SUPINE- ANKLE PUMPS, QUAD SETS, GLUTEAL SETS, KNEE FLEXION, SLR, AND SAQS  SEATED- LAQS, HIP FLEXION, HEEL SLIDES  STANDING- CALF RAISES, KNEE FLEXION, MARCHING    ANKLE ROM ACTIVITIES INCLUDE PLANTARFLEXION AND

## 2024-01-05 SDOH — HEALTH STABILITY: PHYSICAL HEALTH: EXERCISE ACTIVITIES SETS: 2

## 2024-01-05 SDOH — HEALTH STABILITY: PHYSICAL HEALTH: EXERCISE ACTIVITY: KNEE PROTOCOL

## 2024-01-05 SDOH — ECONOMIC STABILITY: HOUSING INSECURITY: OPEN FLAME PRESENT: 1

## 2024-01-05 SDOH — HEALTH STABILITY: PHYSICAL HEALTH: EXERCISE ACTIVITY: ANKLE PROTOCOL

## 2024-01-05 SDOH — HEALTH STABILITY: PHYSICAL HEALTH: EXERCISE COMMENTS: DORSIFLEXION, EVERSION AND INVERSION, ANKLE CIRCLES, AND ANKLE ALPHABETS

## 2024-01-05 SDOH — HEALTH STABILITY: PHYSICAL HEALTH: PHYSICAL EXERCISE: 15

## 2024-01-05 SDOH — HEALTH STABILITY: PHYSICAL HEALTH: EXERCISE TYPE: KNEE AND ANKLE HEP

## 2024-01-05 SDOH — HEALTH STABILITY: PHYSICAL HEALTH: PHYSICAL EXERCISE: SUPINE, SITTING, STANDING

## 2024-01-05 SDOH — HEALTH STABILITY: PHYSICAL HEALTH: PHYSICAL EXERCISE: LONG SITTING

## 2024-01-05 ASSESSMENT — ENCOUNTER SYMPTOMS
PAIN: 1
LOSS OF SENSATION IN FEET: 1
PERSON REPORTING PAIN: PATIENT
PAIN LOCATION - PAIN FREQUENCY: FREQUENT
MUSCLE WEAKNESS: 1
LIMITED RANGE OF MOTION: 1
DEPRESSION: 0
OCCASIONAL FEELINGS OF UNSTEADINESS: 1
PAIN: PATIENT DILIGENT WITH PAIN CONTROL TECHNIQUES
PAIN LOCATION: RIGHT ANKLE
HIGHEST PAIN SEVERITY IN PAST 24 HOURS: 7/10
PAIN LOCATION - PAIN SEVERITY: 7/10
PAIN SEVERITY GOAL: 3/10
SUBJECTIVE PAIN PROGRESSION: UNCHANGED
PAIN LOCATION - EXACERBATING FACTORS: PROLONGED STANDING
LOWEST PAIN SEVERITY IN PAST 24 HOURS: 4/10

## 2024-01-05 ASSESSMENT — ACTIVITIES OF DAILY LIVING (ADL)
AMBULATION ASSISTANCE ON FLAT SURFACES: 1
AMBULATION ASSISTANCE: STAND BY ASSIST
PHYSICAL TRANSFERS ASSESSED: 1
AMBULATION ASSISTANCE: ONE PERSON
CURRENT_FUNCTION: INDEPENDENT
AMBULATION ASSISTANCE: 1

## 2024-01-05 NOTE — PROGRESS NOTES
History of Present Illness   William Moya is a 33 y.o. female presenting today for post-op check from IMN open R tib/fib fx on 11/19/23. Overall doing ok. Has mild pain that is still rated as severe but improved with gabapentin. Incisions are well healing without redness or drainage; her anterior shin wound continues to slowly heal. Compliant with WB recommendations and working with therapy. Denies numbness, tingling, f/c, CP, SOB or any other complaints or concerns.      Past Medical History:   Diagnosis Date    Disease of stomach and duodenum, unspecified     Stomach problems    Encounter for gynecological examination (general) (routine) without abnormal findings 02/12/2016    Well woman exam    Headache, unspecified 12/30/2013    Headache    Other conditions influencing health status     Menstruation    Other conditions influencing health status     History of pregnancy    Presence of (intrauterine) contraceptive device     IUD contraception       Medication Documentation Review Audit       Reviewed by Amari Mandujano on 01/03/24 at 1208      Medication Order Taking? Sig Documenting Provider Last Dose Status   cyclobenzaprine (Flexeril) 10 mg tablet [id number]  Take 1 tablet (10 mg) by mouth 3 times a day as needed for muscle spasms for up to 7 days. Kimberly Escobar PA-C  Active   docusate sodium (Colace) 100 mg capsule [id number] No Take 1 capsule (100 mg) by mouth 2 times a day. Historical Provider, MD Not Taking Active   gabapentin (Neurontin) 300 mg capsule [id number]  Take 1 capsule (300 mg) by mouth 3 times a day. Kimberly Escobar PA-C  Active   levonorgestreL (Liletta) 20.4 mcg/24 hrs (8 yrs) 52 mg intrauterine device [id number] No 52 mg by intrauterine route once daily. Historical Provider, MD Not Taking Active   loratadine (Claritin) 10 mg tablet [id number] No Take 1 tablet (10 mg) by mouth once daily. Historical Provider, MD Not Taking Active   NON FORMULARY [id number]  Take 1 each by mouth once daily.  Tumeric suppliment Historical Provider, MD  Active   oxyCODONE (Roxicodone) 5 mg immediate release tablet [id number]  Take 5 mg by mouth every 6 hours. Indications: pain Historical Provider, MD  Active   pantoprazole (ProtoNix) 40 mg EC tablet [id number] No Take 1 tablet by mouth once daily. Historical Provider, MD Taking Active   simethicone (Mylicon) 80 mg chewable tablet [id number]  Chew 1 tablet every 6 hours if needed for flatulence. Historical Provider, MD  Active                    Allergies   Allergen Reactions    Latex Unknown       Social History     Socioeconomic History    Marital status:      Spouse name: Not on file    Number of children: Not on file    Years of education: Not on file    Highest education level: Not on file   Occupational History    Not on file   Tobacco Use    Smoking status: Some Days     Types: Cigarettes    Smokeless tobacco: Not on file   Vaping Use    Vaping Use: Never used   Substance and Sexual Activity    Alcohol use: Not Currently    Drug use: Never    Sexual activity: Not on file   Other Topics Concern    Not on file   Social History Narrative    Not on file     Social Determinants of Health     Financial Resource Strain: Low Risk  (11/19/2023)    Overall Financial Resource Strain (CARDIA)     Difficulty of Paying Living Expenses: Not hard at all   Food Insecurity: No Food Insecurity (11/19/2023)    Hunger Vital Sign     Worried About Running Out of Food in the Last Year: Never true     Ran Out of Food in the Last Year: Never true   Transportation Needs: No Transportation Needs (11/30/2023)    OASIS : Transportation     Lack of Transportation (Medical): No     Lack of Transportation (Non-Medical): No     Patient Unable or Declines to Respond: No   Physical Activity: Insufficiently Active (11/19/2023)    Exercise Vital Sign     Days of Exercise per Week: 4 days     Minutes of Exercise per Session: 30 min   Stress: No Stress Concern Present (11/19/2023)    [place]  [place] [employer] - Occupational Stress Questionnaire     Feeling of Stress : Not at all   Social Connections: Feeling Socially Integrated (11/30/2023)    OASIS : Social Isolation     Frequency of experiencing loneliness or isolation: Never   Recent Concern: Social Connections - Socially Isolated (11/19/2023)    Social Connection and Isolation Panel [NHANES]     Frequency of Communication with Friends and Family: Once a week     Frequency of Social Gatherings with Friends and Family: Once a week     Attends Jehovah's witness Services: Never     Active Member of Clubs or Organizations: No     Attends Club or Organization Meetings: Never     Marital Status:    Intimate Partner Violence: Not At Risk (11/19/2023)    Humiliation, Afraid, Rape, and Kick questionnaire     Fear of Current or Ex-Partner: No     Emotionally Abused: No     Physically Abused: No     Sexually Abused: No   Housing Stability: Low Risk  (11/19/2023)    Housing Stability Vital Sign     Unable to Pay for Housing in the Last Year: No     Number of Places Lived in the Last Year: 2     Unstable Housing in the Last Year: No       No past surgical history on file.       Review of Systems:  30 point ROS reviewed and negative other than as listed in the HPI     Physical Exam:  Gen: The pt is A&Ox3, NAD, and appear state age and weight  Psychiatric: mood and affect are appropriate   Eyes: sclera are white, EOM grossly intact  ENT: MMM  Neck: supple, thyroid is midline  Respiratory: respirations are nonlabored, chest rise symmetric  CV: rate is regular by palpation of distal pulses  Abdomen: nondistended   Integument: no obvious cutaneous lesions noted. No signs of lymphangitis. No signs of systemic edema.   MSK:  RLE  surgical incision well healing without edema, erythema, drainage, or other s/sx of infection. Her anterior shin wound from site of open fx has a wound that measures less than 1 cm wide by about 3 cm long with fibrinous tissue  in the wound bed but no active drainage. Appropriately tender to palpation around the incision. SILT throughout the leg intact. Intact plantarflexion and dorsiflexion. Foot warm and well perfused.      Imaging:  I personally reviewed multiple views of the  R tib/fib  were obtained in the office today demonstrate maintenance of reduction, interval healing, and a stable position of the hardware.      Assessment   33 y.o. female post-op from IMN open R tib/fib fx on 11/19/23.     Plan:  Continue WBAT on RLE and ROM as tolerated  Start BID WTD Dakins dressings  Refill for james     Follow-up 3 weeks for wound check only.     All of the patient's questions/concerns address and they are in agreement with the plan.

## 2024-01-09 ENCOUNTER — HOME CARE VISIT (OUTPATIENT)
Dept: HOME HEALTH SERVICES | Facility: HOME HEALTH | Age: 34
End: 2024-01-09
Payer: COMMERCIAL

## 2024-01-09 PROCEDURE — G0151 HHCP-SERV OF PT,EA 15 MIN: HCPCS

## 2024-01-09 SDOH — HEALTH STABILITY: PHYSICAL HEALTH: PHYSICAL EXERCISE: SITTING

## 2024-01-09 SDOH — HEALTH STABILITY: PHYSICAL HEALTH: EXERCISE ACTIVITY: LAQS

## 2024-01-09 SDOH — HEALTH STABILITY: PHYSICAL HEALTH: EXERCISE ACTIVITY: HIP FLEXION

## 2024-01-09 SDOH — HEALTH STABILITY: PHYSICAL HEALTH: PHYSICAL EXERCISE: 10

## 2024-01-09 SDOH — ECONOMIC STABILITY: HOUSING INSECURITY: OPEN FLAME PRESENT: 1

## 2024-01-09 SDOH — HEALTH STABILITY: PHYSICAL HEALTH: EXERCISE ACTIVITIES SETS: 1

## 2024-01-09 SDOH — HEALTH STABILITY: PHYSICAL HEALTH: EXERCISE ACTIVITY: KNEE HEELSLIDES

## 2024-01-09 SDOH — HEALTH STABILITY: PHYSICAL HEALTH: EXERCISE TYPE: SEATED HEP

## 2024-01-09 SDOH — HEALTH STABILITY: PHYSICAL HEALTH: EXERCISE ACTIVITIES SETS: 2

## 2024-01-09 SDOH — HEALTH STABILITY: PHYSICAL HEALTH: PHYSICAL EXERCISE: 15

## 2024-01-09 SDOH — HEALTH STABILITY: PHYSICAL HEALTH: EXERCISE ACTIVITY: ANKLE ROM

## 2024-01-09 ASSESSMENT — ACTIVITIES OF DAILY LIVING (ADL)
AMBULATION_DISTANCE/DURATION_TOLERATED: 100 FEET WITH WHEELED WALKER AND SUPERVISION
AMBULATION ASSISTANCE ON FLAT SURFACES: 1
AMBULATION ASSISTANCE: 1
PHYSICAL TRANSFERS ASSESSED: 1
AMBULATION ASSISTANCE: ONE PERSON
AMBULATION ASSISTANCE: STAND BY ASSIST
CURRENT_FUNCTION: INDEPENDENT

## 2024-01-09 ASSESSMENT — ENCOUNTER SYMPTOMS
ARTHRALGIAS: 1
PERSON REPORTING PAIN: PATIENT
OCCASIONAL FEELINGS OF UNSTEADINESS: 1
PAIN LOCATION - PAIN SEVERITY: 8/10
LOSS OF SENSATION IN FEET: 1
PAIN LOCATION - PAIN FREQUENCY: FREQUENT
PAIN LOCATION - EXACERBATING FACTORS: PROLONGED STANDING
DEPRESSION: 0
PAIN: 1
SUBJECTIVE PAIN PROGRESSION: WAXING AND WANING
LIMITED RANGE OF MOTION: 1
HIGHEST PAIN SEVERITY IN PAST 24 HOURS: 9/10
MUSCLE WEAKNESS: 1
PAIN SEVERITY GOAL: 3/10
PAIN LOCATION: RIGHT ANKLE
LOWEST PAIN SEVERITY IN PAST 24 HOURS: 5/10

## 2024-01-10 ENCOUNTER — HOME CARE VISIT (OUTPATIENT)
Dept: HOME HEALTH SERVICES | Facility: HOME HEALTH | Age: 34
End: 2024-01-10
Payer: COMMERCIAL

## 2024-01-10 PROCEDURE — G0151 HHCP-SERV OF PT,EA 15 MIN: HCPCS

## 2024-01-10 SDOH — HEALTH STABILITY: PHYSICAL HEALTH: PHYSICAL EXERCISE: STANDING

## 2024-01-10 SDOH — HEALTH STABILITY: PHYSICAL HEALTH: EXERCISE ACTIVITIES SETS: 2

## 2024-01-10 SDOH — HEALTH STABILITY: PHYSICAL HEALTH: EXERCISE ACTIVITY: ANTERIOR ANDPOSTERIOR WEIGHT SHIFTING

## 2024-01-10 SDOH — HEALTH STABILITY: PHYSICAL HEALTH: PHYSICAL EXERCISE: 10

## 2024-01-10 SDOH — HEALTH STABILITY: PHYSICAL HEALTH: EXERCISE ACTIVITY: LATERAL WEIGHT SHIFTING

## 2024-01-10 SDOH — HEALTH STABILITY: PHYSICAL HEALTH: PHYSICAL EXERCISE: SITTING

## 2024-01-10 SDOH — HEALTH STABILITY: PHYSICAL HEALTH: EXERCISE TYPE: STANDING HEP AND ANKLE ROM

## 2024-01-10 SDOH — HEALTH STABILITY: PHYSICAL HEALTH: EXERCISE ACTIVITY: HIP FLEXION

## 2024-01-10 SDOH — HEALTH STABILITY: PHYSICAL HEALTH: EXERCISE ACTIVITY: ANKLE ROM

## 2024-01-10 ASSESSMENT — ENCOUNTER SYMPTOMS
PAIN LOCATION - PAIN SEVERITY: 5/10
PAIN: PATIENT DILIGENT WITH PAIN CONTROL TECHNIQUES
PAIN: 1
SUBJECTIVE PAIN PROGRESSION: UNCHANGED
ARTHRALGIAS: 1
PAIN SEVERITY GOAL: 3/10
PAIN LOCATION - PAIN FREQUENCY: FREQUENT
MUSCLE WEAKNESS: 1
PAIN LOCATION - EXACERBATING FACTORS: PROLONGED STANDING, ROM
LOWEST PAIN SEVERITY IN PAST 24 HOURS: 4/10
OCCASIONAL FEELINGS OF UNSTEADINESS: 1
LOSS OF SENSATION IN FEET: 1
PERSON REPORTING PAIN: PATIENT
HIGHEST PAIN SEVERITY IN PAST 24 HOURS: 8/10
LIMITED RANGE OF MOTION: 1
DEPRESSION: 0
PAIN LOCATION: RIGHT ANKLE

## 2024-01-10 ASSESSMENT — ACTIVITIES OF DAILY LIVING (ADL)
AMBULATION ASSISTANCE: ONE PERSON
AMBULATION ASSISTANCE: 1
AMBULATION ASSISTANCE ON FLAT SURFACES: 1
PHYSICAL TRANSFERS ASSESSED: 1
CURRENT_FUNCTION: INDEPENDENT
AMBULATION ASSISTANCE: STAND BY ASSIST

## 2024-01-16 ENCOUNTER — HOME CARE VISIT (OUTPATIENT)
Dept: HOME HEALTH SERVICES | Facility: HOME HEALTH | Age: 34
End: 2024-01-16
Payer: COMMERCIAL

## 2024-01-16 PROCEDURE — G0151 HHCP-SERV OF PT,EA 15 MIN: HCPCS

## 2024-01-16 SDOH — HEALTH STABILITY: PHYSICAL HEALTH: PHYSICAL EXERCISE: SITTING

## 2024-01-16 SDOH — HEALTH STABILITY: PHYSICAL HEALTH: EXERCISE TYPE: ANKLE ROM

## 2024-01-16 SDOH — HEALTH STABILITY: PHYSICAL HEALTH: PHYSICAL EXERCISE: 10

## 2024-01-16 SDOH — HEALTH STABILITY: PHYSICAL HEALTH: EXERCISE ACTIVITIES SETS: 2

## 2024-01-16 SDOH — ECONOMIC STABILITY: HOUSING INSECURITY: OPEN FLAME PRESENT: 1

## 2024-01-16 SDOH — HEALTH STABILITY: PHYSICAL HEALTH: EXERCISE ACTIVITY: ANKLE ROM

## 2024-01-16 ASSESSMENT — ENCOUNTER SYMPTOMS
OCCASIONAL FEELINGS OF UNSTEADINESS: 1
PAIN: PATIENT INDEPENDENT WITH PAIN CONTROL TECHNIQUES
DEPRESSION: 0
PERSON REPORTING PAIN: PATIENT
PAIN SEVERITY GOAL: 3/10
PAIN LOCATION - EXACERBATING FACTORS: ROM, PROLONGED STANDING
LOWEST PAIN SEVERITY IN PAST 24 HOURS: 5/10
LOSS OF SENSATION IN FEET: 1
PAIN: 1
SUBJECTIVE PAIN PROGRESSION: UNCHANGED
PAIN LOCATION: RIGHT ANKLE
LIMITED RANGE OF MOTION: 1
MUSCLE WEAKNESS: 1
ARTHRALGIAS: 1
PAIN LOCATION - PAIN SEVERITY: 8/10
PAIN LOCATION - PAIN FREQUENCY: FREQUENT
HIGHEST PAIN SEVERITY IN PAST 24 HOURS: 8/10

## 2024-01-16 ASSESSMENT — ACTIVITIES OF DAILY LIVING (ADL)
AMBULATION ASSISTANCE: ONE PERSON
AMBULATION ASSISTANCE: STAND BY ASSIST
AMBULATION ASSISTANCE ON FLAT SURFACES: 1

## 2024-01-19 ENCOUNTER — HOME CARE VISIT (OUTPATIENT)
Dept: HOME HEALTH SERVICES | Facility: HOME HEALTH | Age: 34
End: 2024-01-19
Payer: COMMERCIAL

## 2024-01-23 ENCOUNTER — HOME CARE VISIT (OUTPATIENT)
Dept: HOME HEALTH SERVICES | Facility: HOME HEALTH | Age: 34
End: 2024-01-23
Payer: COMMERCIAL

## 2024-01-23 PROCEDURE — G0151 HHCP-SERV OF PT,EA 15 MIN: HCPCS

## 2024-01-23 SDOH — ECONOMIC STABILITY: HOUSING INSECURITY: OPEN FLAME PRESENT: 1

## 2024-01-23 SDOH — HEALTH STABILITY: PHYSICAL HEALTH
EXERCISE COMMENTS: ANKLE ROM ACTIVITIES INCLUDE PLANTARFLEXION AND DORSIFLEXION, EVERSION AND INVERSION, ANKLE CIRCLES, AND ANKLE ALPHABETS  KNEE PROTOCOL INCLUDES THE FOLLOWING:  SUPINE- ANKLE PUMPS, QUAD SETS, GLUTEAL SETS, KNEE FLEXION, SLR, AND SAQS  SEATED- LAQS,

## 2024-01-23 SDOH — HEALTH STABILITY: PHYSICAL HEALTH: EXERCISE ACTIVITY: ANKLE ROM

## 2024-01-23 SDOH — HEALTH STABILITY: PHYSICAL HEALTH: EXERCISE ACTIVITIES SETS: 2

## 2024-01-23 SDOH — HEALTH STABILITY: PHYSICAL HEALTH: PHYSICAL EXERCISE: SITTING

## 2024-01-23 SDOH — HEALTH STABILITY: PHYSICAL HEALTH: PHYSICAL EXERCISE: 10

## 2024-01-23 SDOH — HEALTH STABILITY: PHYSICAL HEALTH: EXERCISE ACTIVITY: KNEE ROM

## 2024-01-23 SDOH — HEALTH STABILITY: PHYSICAL HEALTH: EXERCISE COMMENTS: HIP FLEXION, HEEL SLIDES  STANDING- CALF RAISES, KNEE FLEXION, MARCHING

## 2024-01-23 SDOH — HEALTH STABILITY: PHYSICAL HEALTH: PHYSICAL EXERCISE: SUPINE, SITTING, STANDING

## 2024-01-23 SDOH — HEALTH STABILITY: PHYSICAL HEALTH: EXERCISE TYPE: ANKLE AND KNEE ROM

## 2024-01-23 ASSESSMENT — ENCOUNTER SYMPTOMS
ARTHRALGIAS: 1
PAIN LOCATION - RELIEVING FACTORS: ICE, PAIN MEDS
PAIN LOCATION - PAIN SEVERITY: 7/10
LOWEST PAIN SEVERITY IN PAST 24 HOURS: 5/10
OCCASIONAL FEELINGS OF UNSTEADINESS: 1
LIMITED RANGE OF MOTION: 1
DEPRESSION: 0
PAIN: 1
MUSCLE WEAKNESS: 1
PAIN LOCATION: RIGHT ANKLE
PAIN: PATIENT DILIGENT WITH PAIN CONTROL TECHNIQUES
SUBJECTIVE PAIN PROGRESSION: WAXING AND WANING
PAIN SEVERITY GOAL: 3/10
HIGHEST PAIN SEVERITY IN PAST 24 HOURS: 8/10
PAIN LOCATION - EXACERBATING FACTORS: PROLONGED STANDING
PERSON REPORTING PAIN: PATIENT
LOSS OF SENSATION IN FEET: 1
PAIN LOCATION - PAIN FREQUENCY: FREQUENT

## 2024-01-23 ASSESSMENT — ACTIVITIES OF DAILY LIVING (ADL)
AMBULATION ASSISTANCE: STAND BY ASSIST
PHYSICAL TRANSFERS ASSESSED: 1
CURRENT_FUNCTION: INDEPENDENT
AMBULATION ASSISTANCE: ONE PERSON
AMBULATION ASSISTANCE ON FLAT SURFACES: 1
AMBULATION ASSISTANCE: 1

## 2024-01-24 ENCOUNTER — OFFICE VISIT (OUTPATIENT)
Dept: ORTHOPEDIC SURGERY | Facility: CLINIC | Age: 34
End: 2024-01-24
Payer: COMMERCIAL

## 2024-01-24 DIAGNOSIS — S82.201S FRACTURE OF TIBIA WITH FIBULA, RIGHT, OPEN, SEQUELA: Primary | ICD-10-CM

## 2024-01-24 DIAGNOSIS — S82.401S FRACTURE OF TIBIA WITH FIBULA, RIGHT, OPEN, SEQUELA: Primary | ICD-10-CM

## 2024-01-24 PROCEDURE — 99024 POSTOP FOLLOW-UP VISIT: CPT | Performed by: PHYSICIAN ASSISTANT

## 2024-01-24 ASSESSMENT — PAIN - FUNCTIONAL ASSESSMENT: PAIN_FUNCTIONAL_ASSESSMENT: 0-10

## 2024-01-24 ASSESSMENT — PAIN SCALES - GENERAL: PAINLEVEL_OUTOF10: 8

## 2024-01-24 ASSESSMENT — PAIN DESCRIPTION - DESCRIPTORS: DESCRIPTORS: ACHING

## 2024-01-24 NOTE — LETTER
January 24, 2024     Patient: William Moya   YOB: 1990   Date of Visit: 1/24/2024       To Whom It May Concern:    It is my medical opinion that William Moya had surgery to repair her broken leg on 11/19/23. She has been going through physical rehab and recovering from her injury. Due to functional limitations she is unable to work at this time as it could hinder her recovery. She is anticipated to not be able to return to work until 8/1/24.     If you have any questions or concerns, please don't hesitate to call.         Sincerely,        Kimberly Escobar PA-C    CC: No Recipients

## 2024-01-26 ENCOUNTER — HOME CARE VISIT (OUTPATIENT)
Dept: HOME HEALTH SERVICES | Facility: HOME HEALTH | Age: 34
End: 2024-01-26
Payer: COMMERCIAL

## 2024-01-26 PROCEDURE — G0151 HHCP-SERV OF PT,EA 15 MIN: HCPCS

## 2024-01-26 SDOH — HEALTH STABILITY: PHYSICAL HEALTH: EXERCISE ACTIVITIES SETS: 3

## 2024-01-26 SDOH — HEALTH STABILITY: PHYSICAL HEALTH: PHYSICAL EXERCISE: 10

## 2024-01-26 SDOH — HEALTH STABILITY: PHYSICAL HEALTH
EXERCISE COMMENTS: ANKLE ROM ACTIVITIES INCLUDE PLANTARFLEXION AND DORSIFLEXION, EVERSION AND INVERSION, ANKLE CIRCLES, AND ANKLE ALPHABETS    KNEE PROTOCOL INCLUDES THE FOLLOWING:  SUPINE- ANKLE PUMPS, QUAD SETS, GLUTEAL SETS, KNEE FLEXION, SLR, AND SAQS  SEATED- LAQS

## 2024-01-26 SDOH — HEALTH STABILITY: PHYSICAL HEALTH: PHYSICAL EXERCISE: SUPINE AND SITTING

## 2024-01-26 SDOH — HEALTH STABILITY: PHYSICAL HEALTH: EXERCISE TYPE: KNEE AND ANKLE ROM ACTIVITIES

## 2024-01-26 SDOH — HEALTH STABILITY: PHYSICAL HEALTH: PHYSICAL EXERCISE: SITTING, SUPINE, STANDING

## 2024-01-26 SDOH — HEALTH STABILITY: PHYSICAL HEALTH: EXERCISE ACTIVITIES SETS: 2

## 2024-01-26 SDOH — HEALTH STABILITY: PHYSICAL HEALTH: EXERCISE ACTIVITY: KNEE ROM

## 2024-01-26 SDOH — HEALTH STABILITY: PHYSICAL HEALTH: EXERCISE ACTIVITY: ANKLE ROM

## 2024-01-26 SDOH — HEALTH STABILITY: PHYSICAL HEALTH: EXERCISE COMMENTS: , HIP FLEXION, HEEL SLIDES  STANDING- CALF RAISES, KNEE FLEXION, MARCHING

## 2024-01-26 SDOH — HEALTH STABILITY: PHYSICAL HEALTH: PHYSICAL EXERCISE: 15

## 2024-01-26 SDOH — ECONOMIC STABILITY: HOUSING INSECURITY: OPEN FLAME PRESENT: 1

## 2024-01-26 ASSESSMENT — ENCOUNTER SYMPTOMS
PAIN LOCATION - PAIN SEVERITY: 6/10
PAIN SEVERITY GOAL: 3/10
LOWEST PAIN SEVERITY IN PAST 24 HOURS: 4/10
MUSCLE WEAKNESS: 1
HIGHEST PAIN SEVERITY IN PAST 24 HOURS: 8/10
SUBJECTIVE PAIN PROGRESSION: WAXING AND WANING
OCCASIONAL FEELINGS OF UNSTEADINESS: 0
PAIN LOCATION - EXACERBATING FACTORS: PROLONGED STANDING
LOSS OF SENSATION IN FEET: 0
ARTHRALGIAS: 1
PAIN LOCATION - PAIN FREQUENCY: FREQUENT
PAIN LOCATION: RIGHT LEG
PAIN: 1
PAIN LOCATION - RELIEVING FACTORS: ICE, MEDS
LIMITED RANGE OF MOTION: 1
DEPRESSION: 0
PERSON REPORTING PAIN: PATIENT
PAIN: PATIENT INDEPENDENT WITH PAIN CONTROL TECHNIQUES

## 2024-01-26 ASSESSMENT — BALANCE ASSESSMENTS
REACHING FORWARD WITH OUTSTRETCHED ARM WHILE STANDING: 4 - CAN REACH FORWARD CONFIDENTLY 25 CM (10 INCHES)
TURN 360 DEGREES: 3
TURN 360 DEGREES: 3 - ABLE TO TURN 360 DEGREES SAFELY ONE SIDE ONLY 4 SECONDS OR LESS
STANDING UNSUPPORTED WITH FEET TOGETHER: 4 - ABLE TO PLACE FEET TOGETHER INDEPENDENTLY AND STAND 1 MINUTE SAFELY
SITTING TO STANDING: 4
STANDING UNSUPPORTED: 4
STANDING UNSUPPORTED WITH EYES CLOSED: 4 - ABLE TO STAND 10 SECONDS SAFELY
STANDING UNSUPPORTED ONE FOOT IN FRONT: 3
STANDING TO SITTING: 4
STANDING ON ONE LEG: 3 - ABLE TO LIFT LEG INDEPENDENTLY AND HOLD 5-10 SECONDS
SITTING TO STANDING: 4 - ABLE TO STAND WITHOUT USING HANDS AND STABILIZE INDEPENDENTLY
LONG VERSION TOTAL SCORE (MAX 56): 50
PICK UP OBJECT FROM THE FLOOR FROM A STANDING POSITION: 4 - ABLE TO PICK UP SLIPPER SAFELY AND EASILY
STANDING UNSUPPORTED ONE FOOT IN FRONT: 3 - ABLE TO PLACE FOOT AHEAD INDEPENDENTLY AND HOLD 30 SECONDS
TRANSFERS: 4 - ABLE TO TRANSFER SAFELY WITH MINOR USE OF HANDS
COMMENTS: LOW FALL RISK
STANDING ON ONE LEG: 3
STANDING TO SITTING: 4 - SITS SAFELY WITH MINIMAL USE OF HANDS
STANDING UNSUPPORTED WITH EYES CLOSED: 4
STANDING UNSUPPORTED WITH FEET TOGETHER: 4
TRANSFERS: 4

## 2024-01-26 ASSESSMENT — ACTIVITIES OF DAILY LIVING (ADL)
AMBULATION ASSISTANCE ON FLAT SURFACES: 1
PHYSICAL TRANSFERS ASSESSED: 1
AMBULATION ASSISTANCE: 1
AMBULATION ASSISTANCE: INDEPENDENT
OASIS_M1830: 00
HOME_HEALTH_OASIS: 00
CURRENT_FUNCTION: INDEPENDENT

## 2024-01-26 NOTE — PROGRESS NOTES
History of Present Illness   William Moya is a 33 y.o. female presenting today for wound check. She had IMN open R tib/fib on 23 and had healing wound on her anterior shin that we have been doing local wound care on. Reports that her wound is nearly finished healing. Is still having fluctuating pain and using her boot for walking. Has only done in home therapy.      Past Medical History:   Diagnosis Date    Disease of stomach and duodenum, unspecified     Stomach problems    Encounter for gynecological examination (general) (routine) without abnormal findings 2016    Well woman exam    Headache, unspecified 2013    Headache    Other conditions influencing health status     Menstruation    Other conditions influencing health status     History of pregnancy    Presence of (intrauterine) contraceptive device     IUD contraception       Medication Documentation Review Audit       Reviewed by Kasia Matthews MA (Medical Assistant) on 24 at 1445      Medication Order Taking? Sig Documenting Provider Last Dose Status   cyclobenzaprine (Flexeril) 10 mg tablet [id number]  Take 1 tablet (10 mg) by mouth 3 times a day as needed for muscle spasms for up to 7 days. Kimberly Escobar PA-C   24 2359   docusate sodium (Colace) 100 mg capsule [id number] No Take 1 capsule (100 mg) by mouth 2 times a day. Historical Provider, MD Not Taking Active   gabapentin (Neurontin) 300 mg capsule [id number]  Take 1 capsule (300 mg) by mouth 3 times a day. Kimberly Escobar PA-C  Active   levonorgestreL (Liletta) 20.4 mcg/24 hrs (8 yrs) 52 mg intrauterine device [id number] No 52 mg by intrauterine route once daily. Pita Bennett MD Not Taking Active   loratadine (Claritin) 10 mg tablet [id number] No Take 1 tablet (10 mg) by mouth once daily. Pita Bennett MD Not Taking Active   NON FORMULARY [id number]  Take 1 each by mouth once daily. Tumeric suppliment Historical Provider, MD  Active   oxyCODONE  (Roxicodone) 5 mg immediate release tablet [id number]  Take 5 mg by mouth every 6 hours. Indications: pain Historical Provider, MD  Active   pantoprazole (ProtoNix) 40 mg EC tablet [id number] No Take 1 tablet by mouth once daily. Historical Provider, MD Taking  01/10/24 4846   simethicone (Mylicon) 80 mg chewable tablet [id number]  Chew 1 tablet every 6 hours if needed for flatulence. Historical Provider, MD  Active                    Allergies   Allergen Reactions    Latex Unknown       Social History     Socioeconomic History    Marital status:      Spouse name: Not on file    Number of children: Not on file    Years of education: Not on file    Highest education level: Not on file   Occupational History    Not on file   Tobacco Use    Smoking status: Some Days     Types: Cigarettes    Smokeless tobacco: Not on file   Vaping Use    Vaping Use: Never used   Substance and Sexual Activity    Alcohol use: Not Currently    Drug use: Never    Sexual activity: Not on file   Other Topics Concern    Not on file   Social History Narrative    Not on file     Social Determinants of Health     Financial Resource Strain: Low Risk  (2023)    Overall Financial Resource Strain (CARDIA)     Difficulty of Paying Living Expenses: Not hard at all   Food Insecurity: No Food Insecurity (2023)    Hunger Vital Sign     Worried About Running Out of Food in the Last Year: Never true     Ran Out of Food in the Last Year: Never true   Transportation Needs: No Transportation Needs (2023)    OASIS : Transportation     Lack of Transportation (Medical): No     Lack of Transportation (Non-Medical): No     Patient Unable or Declines to Respond: No   Physical Activity: Insufficiently Active (2023)    Exercise Vital Sign     Days of Exercise per Week: 4 days     Minutes of Exercise per Session: 30 min   Stress: No Stress Concern Present (2023)    [place] [employer] - Occupational  Stress Questionnaire     Feeling of Stress : Not at all   Social Connections: Feeling Socially Integrated (11/30/2023)    OASIS : Social Isolation     Frequency of experiencing loneliness or isolation: Never   Recent Concern: Social Connections - Socially Isolated (11/19/2023)    Social Connection and Isolation Panel [NHANES]     Frequency of Communication with Friends and Family: Once a week     Frequency of Social Gatherings with Friends and Family: Once a week     Attends Cheondoism Services: Never     Active Member of Clubs or Organizations: No     Attends Club or Organization Meetings: Never     Marital Status:    Intimate Partner Violence: Not At Risk (11/19/2023)    Humiliation, Afraid, Rape, and Kick questionnaire     Fear of Current or Ex-Partner: No     Emotionally Abused: No     Physically Abused: No     Sexually Abused: No   Housing Stability: Low Risk  (11/19/2023)    Housing Stability Vital Sign     Unable to Pay for Housing in the Last Year: No     Number of Places Lived in the Last Year: 2     Unstable Housing in the Last Year: No       No past surgical history on file.       Review of Systems:  30 point ROS reviewed and negative other than as listed in the HPI     Physical Exam:  Gen: The pt is A&Ox3, NAD, and appear state age and weight  Psychiatric: mood and affect are appropriate   Eyes: sclera are white, EOM grossly intact  ENT: MMM  Neck: supple, thyroid is midline  Respiratory: respirations are nonlabored, chest rise symmetric  CV: rate is regular by palpation of distal pulses  Abdomen: nondistended   Integument: no obvious cutaneous lesions noted. No signs of lymphangitis. No signs of systemic edema.   MSK:  RLE  surgical incision well healing without edema, erythema, drainage, or other s/sx of infection. Her anterior shin wound from site of open fx has a wound that measures less than 1 cm wide by about 3 cm long with fibrinous tissue in the wound bed but no active drainage.  Appropriately tender to palpation around the incision. SILT throughout the leg intact. Intact plantarflexion and dorsiflexion. Foot warm and well perfused.      Imaging:  I personally reviewed multiple views of the  R tib/fib  were obtained in the office today demonstrate maintenance of reduction, interval healing, and a stable position of the hardware.      Assessment   33 y.o. female post-op from IMN open R tib/fib fx on 11/19/23.     Plan:  Continue WBAT on RLE and ROM as tolerated  I encouraged her to transition to outpatient therapy at this time  Can DC wtd dressings and just start dry dressing changes as needed     Follow-up 4 weeks with 2 views R tib/fib.     All of the patient's questions/concerns address and they are in agreement with the plan.

## 2024-02-07 ENCOUNTER — APPOINTMENT (OUTPATIENT)
Dept: PHYSICAL THERAPY | Facility: CLINIC | Age: 34
End: 2024-02-07
Payer: COMMERCIAL

## 2024-02-09 ENCOUNTER — APPOINTMENT (OUTPATIENT)
Dept: PHYSICAL THERAPY | Facility: CLINIC | Age: 34
End: 2024-02-09
Payer: COMMERCIAL

## 2024-02-09 ENCOUNTER — EVALUATION (OUTPATIENT)
Dept: PHYSICAL THERAPY | Facility: CLINIC | Age: 34
End: 2024-02-09
Payer: COMMERCIAL

## 2024-02-09 DIAGNOSIS — S82.201S FRACTURE OF TIBIA WITH FIBULA, RIGHT, OPEN, SEQUELA: ICD-10-CM

## 2024-02-09 DIAGNOSIS — S82.401S FRACTURE OF TIBIA WITH FIBULA, RIGHT, OPEN, SEQUELA: ICD-10-CM

## 2024-02-09 DIAGNOSIS — M25.571 RIGHT ANKLE PAIN: Primary | ICD-10-CM

## 2024-02-09 DIAGNOSIS — R26.2 DIFFICULTY WALKING: ICD-10-CM

## 2024-02-09 PROCEDURE — 97110 THERAPEUTIC EXERCISES: CPT | Mod: GP

## 2024-02-09 PROCEDURE — 97161 PT EVAL LOW COMPLEX 20 MIN: CPT | Mod: GP

## 2024-02-09 ASSESSMENT — ENCOUNTER SYMPTOMS
OCCASIONAL FEELINGS OF UNSTEADINESS: 0
LOSS OF SENSATION IN FEET: 0
DEPRESSION: 0

## 2024-02-09 ASSESSMENT — PAIN SCALES - GENERAL: PAINLEVEL_OUTOF10: 6

## 2024-02-09 NOTE — PROGRESS NOTES
"Physical Therapy  Physical Therapy Orthopedic Evaluation    Patient Name: William Moya \"Aleksander\"  MRN: [id number]  Today's Date: 2/9/2024  Time Calculation  Start Time: 0745  Stop Time: 0830  Time Calculation (min): 45 min    Insurance:  Visit number: 1  Approved number of visits:  ?  Insurance: [facility]  Auth date ?  to ?    Current Problem  1. Right ankle pain  Follow Up In Physical Therapy      2. Fracture of tibia with fibula, right, open, sequela  Referral to Physical Therapy      3. Difficulty walking            General  Reason for Referral: IMN R LE 11/19/23  Referred By: sherif  Precautions  Precautions  Precautions Comment: none  Pain  Pain Score: 6    Subjective:   Subjective   Chief Complaint: November was at a wine tasting event and fell up stairs outside wearing heels under-stepped the stairs and tripped and landed on the bone.       Exit wound still healing.  Also has numbness from mid shaft down into foot  Onset: 11/1/23  JACEK:  fall    Current Condition:   better     PAIN  increases pain/difficulty:  walking, stairs, standing, warm water, AM wearing shoes  decrease pain:  boot, ice bath, compression sock, gels,     Intensity (0-10): current 6, on a bad day 8, on a good day 5  Location: lower leg  Description: shooting sharp, throbbing    Relevant Information (PMH & Previous Tests/Imaging): xrays  Previous Interventions/Treatments: home PT    Current level of function/exercise: PT exercises    Work status: none    Pt stated goal(s) walk better, manage pain    Living situation   - reviewed and no concern  Personal factors Impacting care:   - language: ENG    Red Flags: Do you have any of the following? none  Fever/chills, unexplained weight changes, dizziness/fainting, unexplained change in bowel or bladder functions, unexplained malaise or muscle weakness, night pain/sweats, numbness or tingling      Objective:  Objective     Ankle ROM  DF 2/10  PF  40/55  IV  35/35  ER  6/21    Ankle MMT  DF 5/5  PF " 5/5  IV 4-/5  EV 4-/ 5    LE strength  Hip flexion 4+/5  Hip abd 5/5  Hip add 5/5  Quads 5/5  HS 4/5      Gait antalgic in boot decreased stride   Wound healed scabbed in middle, discolored around wound  Scar well healed but tender  Hypersensitive below mid shin through top of foot    Outcome Measures:  Other Measures  Lower Extremity Funtional Score (LEFS): 24     EDUCATION: home exercise program, plan of care, activity modifications, pain management, and injury pathology       HEP: Performed and provided:  Access Code: [email or web address]  URL: [email or web address]  Date: 02/09/2024  Prepared by: Audrey Pleitez    Exercises  - Long Sitting Ankle Eversion with Resistance  - 1 x daily - 7 x weekly - 2 sets - 20 reps  - Long Sitting Ankle Plantar Flexion with Resistance  - 1 x daily - 7 x weekly - 2 sets - 20 reps  - Long Sitting Ankle Inversion with Resistance  - 1 x daily - 7 x weekly - 2-3 sets - 20 reps  - Ankle Dorsiflexion with Resistance  - 1 x daily - 7 x weekly - 2 sets - 20 reps  - Long Sitting Calf Stretch with Strap  - 1 x daily - 7 x weekly - 2-3 reps - 30-60 seconds hold  - Seated Great Toe Extension  - 1 x daily - 7 x weekly - 1-2 sets - 20 reps  - Seated Lesser Toes Extension  - 1 x daily - 7 x weekly - 1-2 sets - 20 reps    Assessment: Patient is a 32 yo f with c/o R ankle pain s/p IMN 11/19/23.   Pt presents with signs and symptoms consistent with surgery, resulting in limited participation in pain-free ADLs and inability to perform at their prior level of function. Pt would benefit from physical therapy to address the impairments found & listed previously in the objective section in order to return to safe and pain-free ADLs and prior level of function.       Plan:      Planned Interventions include: therapeutic exercise, self-care home management, manual therapy, therapeutic activities, gait training, neuromuscular coordination, aquatic therapy  Frequency: 1-2 x week  Duration: 8  weeks    Goals:  Active       PT Problem       PT Goal 1       Start:  02/09/24    Expected End:  02/23/24       Independent HEP by 2 weeks         PT Goal 2       Start:  02/09/24    Expected End:  04/09/24       Ankle ROM R = L by 4-6 weeks  Strength MMT B LE by 6-8 weeks  Stairs reciprocal with ease without use of rails by 4-6 weeks  Able to wear tennis shoe without pain all day by 4-6 weeks  Sleep through the night and wake up without pain or stiffness by 6-8 weeks  Able to stand > 1 hour to perform ADLs without increased pain  by 6-8 weeks  Resume walking and yoga for exercise by 8 weeks  LEFS improved by 30 points by 8 weeks

## 2024-02-12 ENCOUNTER — TREATMENT (OUTPATIENT)
Dept: PHYSICAL THERAPY | Facility: CLINIC | Age: 34
End: 2024-02-12
Payer: COMMERCIAL

## 2024-02-12 DIAGNOSIS — R26.2 DIFFICULTY WALKING: ICD-10-CM

## 2024-02-12 DIAGNOSIS — M25.571 RIGHT ANKLE PAIN: Primary | ICD-10-CM

## 2024-02-12 PROCEDURE — 97112 NEUROMUSCULAR REEDUCATION: CPT | Mod: GP

## 2024-02-12 PROCEDURE — 97110 THERAPEUTIC EXERCISES: CPT | Mod: GP

## 2024-02-12 ASSESSMENT — PAIN SCALES - GENERAL: PAINLEVEL_OUTOF10: 5 - MODERATE PAIN

## 2024-02-12 NOTE — PROGRESS NOTES
Physical Therapy Treatment    Patient Name: William Moya  MRN: [id number]  Today's Date: 2/12/2024  Time Calculation  Start Time: 1150  Stop Time: 1235  Time Calculation (min): 45 min    Insurance:   Visit number: 2  Approved number of visits:  20  Insurance: grayson  Auth date 2/9/24  to 4/30/24    Current Problem  1. Right ankle pain        2. Difficulty walking            General  Reason for Referral: IMN KETURAH LE 11/19/23  Referred By: sherif  Precautions  Precautions  Precautions Comment: none  Pain  Pain Score: 5 - Moderate pain  Pain Location: Ankle    Subjective:   Subjective   Patient reports SORE DID A LOT OF stairs this weekend in her shoes.      Compliant with HEP? yes    Objective:   Objective   Ankle ROM  DF 2/10  PF  40/55  IV  35/35  ER  6/21     Ankle MMT  DF 5/5  PF 5/5  IV 4-/5  EV 4-/ 5     LE strength  Hip flexion 4+/5  Hip abd 5/5  Hip add 5/5  Quads 5/5  HS 4/5    Treatments:     Nu step 5 min  Incline stretch 1 min   TKE green belt 12 x 2 R  Valslides groin 15 x 2 way B  Bosu lunges 20 x B  Step up 15 x B 6 inch  Retro step 6 inch 15 x R  Sit-stand black band 12 x 2     Assessment: challenged with balance exercises.  Did report pain with standing exercises but able to push through.     Valgus collapse noted with CKC activities will continue to work on glut strength    Plan: glut balance stability

## 2024-02-14 ENCOUNTER — TREATMENT (OUTPATIENT)
Dept: PHYSICAL THERAPY | Facility: CLINIC | Age: 34
End: 2024-02-14
Payer: COMMERCIAL

## 2024-02-14 DIAGNOSIS — M25.571 RIGHT ANKLE PAIN: ICD-10-CM

## 2024-02-14 PROCEDURE — 97110 THERAPEUTIC EXERCISES: CPT | Mod: GP

## 2024-02-14 PROCEDURE — 97140 MANUAL THERAPY 1/> REGIONS: CPT | Mod: GP

## 2024-02-14 ASSESSMENT — PAIN SCALES - GENERAL: PAINLEVEL_OUTOF10: 4

## 2024-02-14 NOTE — PROGRESS NOTES
Physical Therapy Treatment    Patient Name: William Moya  MRN: [id number]  Today's Date: 2/14/2024  Time Calculation  Start Time: 0922  Stop Time: 1004  Time Calculation (min): 42 min    Insurance:   Visit number: 3  Approved number of visits:  20  Insurance: grayson  Auth date 2/9/24  to 4/30/24    Current Problem  1. Right ankle pain  Follow Up In Physical Therapy          General  Reason for Referral: IMN R LE 11/19/23  Referred By: sherif  Precautions  Precautions  Precautions Comment: none  Pain  Pain Score: 4    Subjective:   Subjective   Patient reports doing a bit better today.  Pain 4/10.  Had ice bath after last visit.  Ankle more stiff than the knee lately.     Compliant with HEP? yes    Objective:   Objective   Ankle ROM  DF 2/10  PF  40/55  IV  35/35  ER  6/21     Ankle MMT  DF 5/5  PF 5/5  IV 4-/5  EV 4-/ 5     LE strength  Hip flexion 4+/5  Hip abd 5/5  Hip add 5/5  Quads 5/5  HS 4/5    Treatments:     Nu step 3 min  Incline stretch 1 min   PF stretch 1 min x 3 R  STM to lateral, medial ankle scars, and intrinsics R  foot  AP to TC joint R ankle  Bosu lunges 20 x B  Bosu Step up 15 x B   Bosu squats 20 x     Assessment:   reported increased mobility after manual work.   Improved gait after treatment.     Plan: glut balance stability     Audrey Pleitez, PT

## 2024-02-21 ENCOUNTER — OFFICE VISIT (OUTPATIENT)
Dept: ORTHOPEDIC SURGERY | Facility: CLINIC | Age: 34
End: 2024-02-21
Payer: COMMERCIAL

## 2024-02-21 ENCOUNTER — HOSPITAL ENCOUNTER (OUTPATIENT)
Dept: RADIOLOGY | Facility: CLINIC | Age: 34
Discharge: HOME | End: 2024-02-21
Payer: COMMERCIAL

## 2024-02-21 VITALS — WEIGHT: 116 LBS | BODY MASS INDEX: 20.55 KG/M2 | HEIGHT: 63 IN

## 2024-02-21 DIAGNOSIS — S82.401S FRACTURE OF TIBIA WITH FIBULA, RIGHT, OPEN, SEQUELA: ICD-10-CM

## 2024-02-21 DIAGNOSIS — S82.201S FRACTURE OF TIBIA WITH FIBULA, RIGHT, OPEN, SEQUELA: ICD-10-CM

## 2024-02-21 DIAGNOSIS — S82.201S FRACTURE OF TIBIA WITH FIBULA, RIGHT, OPEN, SEQUELA: Primary | ICD-10-CM

## 2024-02-21 DIAGNOSIS — S82.401S FRACTURE OF TIBIA WITH FIBULA, RIGHT, OPEN, SEQUELA: Primary | ICD-10-CM

## 2024-02-21 PROCEDURE — 73610 X-RAY EXAM OF ANKLE: CPT | Mod: RT

## 2024-02-21 PROCEDURE — 73590 X-RAY EXAM OF LOWER LEG: CPT | Mod: RT

## 2024-02-21 PROCEDURE — 99214 OFFICE O/P EST MOD 30 MIN: CPT | Performed by: PHYSICIAN ASSISTANT

## 2024-02-21 PROCEDURE — 73590 X-RAY EXAM OF LOWER LEG: CPT | Mod: RIGHT SIDE | Performed by: RADIOLOGY

## 2024-02-21 PROCEDURE — 73610 X-RAY EXAM OF ANKLE: CPT | Mod: RIGHT SIDE | Performed by: RADIOLOGY

## 2024-02-21 RX ORDER — GABAPENTIN 800 MG/1
800 TABLET ORAL 3 TIMES DAILY
Qty: 90 TABLET | Refills: 11 | Status: SHIPPED | OUTPATIENT
Start: 2024-02-21 | End: 2025-02-20

## 2024-02-21 ASSESSMENT — PAIN - FUNCTIONAL ASSESSMENT: PAIN_FUNCTIONAL_ASSESSMENT: 0-10

## 2024-02-21 ASSESSMENT — PAIN DESCRIPTION - DESCRIPTORS: DESCRIPTORS: THROBBING;SHARP;SHOOTING;BURNING

## 2024-02-21 ASSESSMENT — PAIN SCALES - GENERAL: PAINLEVEL_OUTOF10: 6

## 2024-02-21 NOTE — PROGRESS NOTES
History of Present Illness   William Moya is a 33 y.o. female presenting today for follow up from IMN open R tib/fib fx on 23. Since last visit she feels like she has made a lot of improvements. She has transitioned to outpatient therapy and no longer needing her boot for assistance with ambulating. Sometimes has burning pain in her tibia; has doubled up on her gabapentin which seems to be helping. Her wounds have all healed. No other complaints or concerns.        Past Medical History:   Diagnosis Date    Disease of stomach and duodenum, unspecified     Stomach problems    Encounter for gynecological examination (general) (routine) without abnormal findings 2016    Well woman exam    Headache, unspecified 2013    Headache    Other conditions influencing health status     Menstruation    Other conditions influencing health status     History of pregnancy    Presence of (intrauterine) contraceptive device     IUD contraception       Medication Documentation Review Audit       Reviewed by Amari Mandujano on 24 at 1449      Medication Order Taking? Sig Documenting Provider Last Dose Status   cyclobenzaprine (Flexeril) 10 mg tablet [id number]  Take 1 tablet (10 mg) by mouth 3 times a day as needed for muscle spasms for up to 7 days. Kimberly Escobar PA-C   24 2359   docusate sodium (Colace) 100 mg capsule [id number] No Take 1 capsule (100 mg) by mouth 2 times a day. Historical Provider, MD Not Taking Active   gabapentin (Neurontin) 300 mg capsule [id number]  Take 1 capsule (300 mg) by mouth 3 times a day. Kimberly Escobar PA-C  Active   levonorgestreL (Liletta) 20.4 mcg/24 hrs (8 yrs) 52 mg intrauterine device [id number] No 52 mg by intrauterine route once daily. Historical Provider, MD Not Taking Active   loratadine (Claritin) 10 mg tablet [id number] No Take 1 tablet (10 mg) by mouth once daily. Historical Provider, MD Not Taking Active   NON FORMULARY [id number]  Take 1 each by mouth  once daily. Tumeric suppliment Historical Provider, MD  Active   oxyCODONE (Roxicodone) 5 mg immediate release tablet [id number]  Take 5 mg by mouth every 6 hours. Indications: pain Historical Provider, MD  Active   pantoprazole (ProtoNix) 40 mg EC tablet [id number] No Take 1 tablet by mouth once daily. Historical Provider, MD Taking  01/10/24 4046                    Allergies   Allergen Reactions    Latex Unknown       Social History     Socioeconomic History    Marital status:      Spouse name: Not on file    Number of children: Not on file    Years of education: Not on file    Highest education level: Not on file   Occupational History    Not on file   Tobacco Use    Smoking status: Some Days     Types: Cigarettes    Smokeless tobacco: Not on file   Vaping Use    Vaping Use: Never used   Substance and Sexual Activity    Alcohol use: Not Currently    Drug use: Never    Sexual activity: Not on file   Other Topics Concern    Not on file   Social History Narrative    Not on file     Social Determinants of Health     Financial Resource Strain: Low Risk  (2023)    Overall Financial Resource Strain (CARDIA)     Difficulty of Paying Living Expenses: Not hard at all   Food Insecurity: No Food Insecurity (2023)    Hunger Vital Sign     Worried About Running Out of Food in the Last Year: Never true     Ran Out of Food in the Last Year: Never true   Transportation Needs: No Transportation Needs (2024)    OASIS : Transportation     Lack of Transportation (Medical): No     Lack of Transportation (Non-Medical): No     Patient Unable or Declines to Respond: No   Physical Activity: Insufficiently Active (2023)    Exercise Vital Sign     Days of Exercise per Week: 4 days     Minutes of Exercise per Session: 30 min   Stress: No Stress Concern Present (2023)    [employer] - Occupational Stress Questionnaire     Feeling of Stress : Not at all   Social  Connections: Feeling Socially Integrated (1/26/2024)    OASIS : Social Isolation     Frequency of experiencing loneliness or isolation: Never   Recent Concern: Social Connections - Socially Isolated (11/19/2023)    Social Connection and Isolation Panel [NHANES]     Frequency of Communication with Friends and Family: Once a week     Frequency of Social Gatherings with Friends and Family: Once a week     Attends Mandaen Services: Never     Active Member of Clubs or Organizations: No     Attends Club or Organization Meetings: Never     Marital Status:    Intimate Partner Violence: Not At Risk (11/19/2023)    Humiliation, Afraid, Rape, and Kick questionnaire     Fear of Current or Ex-Partner: No     Emotionally Abused: No     Physically Abused: No     Sexually Abused: No   Housing Stability: Low Risk  (11/19/2023)    Housing Stability Vital Sign     Unable to Pay for Housing in the Last Year: No     Number of Places Lived in the Last Year: 2     Unstable Housing in the Last Year: No       History reviewed. No pertinent surgical history.       Review of Systems:  30 point ROS reviewed and negative other than as listed in the HPI     Physical Exam:  Gen: The pt is A&Ox3, NAD, and appear state age and weight  Psychiatric: mood and affect are appropriate   Eyes: sclera are white, EOM grossly intact  ENT: MMM  Neck: supple, thyroid is midline  Respiratory: respirations are nonlabored, chest rise symmetric  CV: rate is regular by palpation of distal pulses  Abdomen: nondistended   Integument: no obvious cutaneous lesions noted. No signs of lymphangitis. No signs of systemic edema.   MSK:  RLE  surgical incision well healing without edema, erythema, drainage, or other s/sx of infection. Her anterior shin wound from site of open fx has a wound that measures less than 1 cm wide by about 3 cm long with fibrinous tissue in the wound bed but no active drainage. Appropriately tender to palpation around the incision.  SILT throughout the leg intact. Intact plantarflexion and dorsiflexion. Foot warm and well perfused.      Imaging:  I personally reviewed multiple views of the  R tib/fib  were obtained in the office today demonstrate maintenance of reduction, interval healing, and a stable position of the hardware.      Assessment   33 y.o. female post-op from IMN open R tib/fib fx on 11/19/23.     Plan:  Continue WBAT on RLE and ROM as tolerated  Continue therapy  Increase gabapentin to augment pain control.      Follow-up 3 months with 2 views R tib/fib.     All of the patient's questions/concerns address and they are in agreement with the plan.

## 2024-02-22 ENCOUNTER — APPOINTMENT (OUTPATIENT)
Dept: PHYSICAL THERAPY | Facility: CLINIC | Age: 34
End: 2024-02-22
Payer: COMMERCIAL

## 2024-02-28 ENCOUNTER — TREATMENT (OUTPATIENT)
Dept: PHYSICAL THERAPY | Facility: CLINIC | Age: 34
End: 2024-02-28
Payer: COMMERCIAL

## 2024-02-28 DIAGNOSIS — M25.571 RIGHT ANKLE PAIN: ICD-10-CM

## 2024-02-28 PROCEDURE — 97140 MANUAL THERAPY 1/> REGIONS: CPT | Mod: GP,CQ | Performed by: SPECIALIST/TECHNOLOGIST

## 2024-02-28 PROCEDURE — 97110 THERAPEUTIC EXERCISES: CPT | Mod: GP,CQ | Performed by: SPECIALIST/TECHNOLOGIST

## 2024-02-28 ASSESSMENT — PAIN SCALES - GENERAL: PAINLEVEL_OUTOF10: 3

## 2024-02-28 ASSESSMENT — PAIN - FUNCTIONAL ASSESSMENT: PAIN_FUNCTIONAL_ASSESSMENT: 0-10

## 2024-02-28 NOTE — PROGRESS NOTES
Physical Therapy Treatment    Patient Name: William Moya  MRN: [id number]  Today's Date: 2/28/2024  Time Calculation  Start Time: 1640  Stop Time: 1725  Time Calculation (min): 45 min    Insurance:   Visit number: 4  Approved number of visits:  20  Insurance: grayson  Auth date 2/9/24  to 4/30/24    Current Problem  1. Right ankle pain  Follow Up In Physical Therapy          General  Reason for Referral: IMN R LE 11/19/23  Referred By: sherif  Precautions  Precautions  Precautions Comment: none  Pain  Pain Assessment: 0-10  Pain Score: 3  Pain Location: Ankle    Subjective:   Subjective   Patient reports arrived full weight bearing with a slight limp noting some R shin and ankle pain.      Compliant with HEP? yes    Objective:   Objective   Ankle ROM 8°-0°-50°    Treatments:   Nu step 3 min  Subtalar rock 2'   Heel taps 30 sec   DBE 2x2 min   4 kg KB tandem & swing cw/ccw 10x R/L   Bal bd DF/PF 10#/10# 2'   Bal bd I/E 5#/2.5# 2'   Hip ext R/L 44#/44# 20x   Incline stretch 1 min   Swisswing feet 2'   STM to lateral, medial ankle scars, and intrinsics R  foot  AP to TC joint R ankle  Towel massage    Assessment:   Patient challenged by balance and ankle I/E isometrics.  Patient noted towel massage and edema massage to top of foot substantially decreased sensitivity on shin and ankle.       Plan: Continue to improve ROM, strength, flexibility and balance to improve gait and ADL     Mikey Fernandez, PTA, AT

## 2024-02-29 ENCOUNTER — APPOINTMENT (OUTPATIENT)
Dept: PHYSICAL THERAPY | Facility: CLINIC | Age: 34
End: 2024-02-29
Payer: COMMERCIAL

## 2024-03-05 ENCOUNTER — TREATMENT (OUTPATIENT)
Dept: PHYSICAL THERAPY | Facility: CLINIC | Age: 34
End: 2024-03-05
Payer: COMMERCIAL

## 2024-03-05 DIAGNOSIS — R26.2 DIFFICULTY WALKING: Primary | ICD-10-CM

## 2024-03-05 DIAGNOSIS — M25.571 RIGHT ANKLE PAIN: ICD-10-CM

## 2024-03-05 PROCEDURE — 97110 THERAPEUTIC EXERCISES: CPT | Mod: GP,CQ

## 2024-03-05 PROCEDURE — 97112 NEUROMUSCULAR REEDUCATION: CPT | Mod: GP,CQ

## 2024-03-05 ASSESSMENT — PAIN - FUNCTIONAL ASSESSMENT: PAIN_FUNCTIONAL_ASSESSMENT: 0-10

## 2024-03-05 ASSESSMENT — PAIN SCALES - GENERAL: PAINLEVEL_OUTOF10: 6

## 2024-03-05 NOTE — PROGRESS NOTES
Physical Therapy Treatment    Patient Name: William Moya  MRN: [id number]  Today's Date: 3/5/2024  Time Calculation  Start Time: 1000  Stop Time: 1045  Time Calculation (min): 45 min    Insurance:   Visit number: 5  Approved number of visits:  20  Insurance: grayson  Auth date 2/9/24  to 4/30/24    Current Problem  1. Difficulty walking        2. Right ankle pain  Follow Up In Physical Therapy          General     Precautions  Precautions  Precautions Comment: none  Pain  Pain Assessment: 0-10  Pain Score: 6  Pain Location: Ankle  Pain Orientation: Right    Subjective:   Subjective   Patient reports that her ankle is a little sore from having a busy weekend and being on her feet a lot.  Pain is a 6 today.    Compliant with HEP? yes    Objective:   Objective   Ankle ROM DF/PF 8°-0°-53°   IN 30°  EV 0°  Swelling    Treatments:   Subtalar rock 2'   STM to lateral, medial ankle scars and intrinsics R foot  AP mob to TC joint R ankle  Towel massage to R foot and LE for desensitization   Bike- 4'  DBE 2x1.5'  Heel taps 30 sec   Biodex balance LOS L12- 22%, 36%  4 kg KB tandem & swing R/L cw/ccw- 1' ea dir.  Wt bd DF/PF 10#/10#   IN/EV 5#/5#- 1.5' ea dir.  Hip ext 33#/33# R/L x 20 ea.   SS Incline bd Gastroc/Soleus- 1' ea.    Charges: TE 2, NME 1 (CQ Modifier)    Assessment:     Seemed to have tolerated all the activities better than last week.  Omitted the Swisswing 2° to 3 fracture sites still incomplete.    Plan:   Continue to improve ROM, strength, flexibility and balance to improve gait and ADL     Lemuel Hawkins , PTA

## 2024-03-07 ENCOUNTER — TREATMENT (OUTPATIENT)
Dept: PHYSICAL THERAPY | Facility: CLINIC | Age: 34
End: 2024-03-07
Payer: COMMERCIAL

## 2024-03-07 DIAGNOSIS — M25.571 RIGHT ANKLE PAIN: ICD-10-CM

## 2024-03-07 PROCEDURE — 97110 THERAPEUTIC EXERCISES: CPT | Mod: GP,CQ

## 2024-03-07 PROCEDURE — 97016 VASOPNEUMATIC DEVICE THERAPY: CPT | Mod: GP,CQ

## 2024-03-07 PROCEDURE — 97112 NEUROMUSCULAR REEDUCATION: CPT | Mod: GP,CQ

## 2024-03-07 ASSESSMENT — PAIN SCALES - GENERAL: PAINLEVEL_OUTOF10: 6

## 2024-03-07 ASSESSMENT — PAIN - FUNCTIONAL ASSESSMENT: PAIN_FUNCTIONAL_ASSESSMENT: 0-10

## 2024-03-07 NOTE — PROGRESS NOTES
Physical Therapy Treatment    Patient Name: William Moya  MRN: [id number]  Today's Date: 3/7/2024  Time Calculation  Start Time: 1050  Stop Time: 1145  Time Calculation (min): 55 min    Insurance:   Visit number: 6  Approved number of visits:  20  Insurance: grayson  Auth date 2/9/24  to 4/30/24    Current Problem  1. Right ankle pain  Follow Up In Physical Therapy          General     Precautions  Precautions  Precautions Comment: none  Pain  Pain Assessment: 0-10  Pain Score: 6  Pain Location: Ankle  Pain Orientation: Right    Subjective:   Subjective   Patient reports that her ankle is still giving her pain.  Today it's a 6.  Pain is a 6 today.    Compliant with HEP? yes    Objective:   Objective   Ankle ROM DF 0°  PF 43°  IN 25°  EV 10°    Treatments:   Subtalar rock 2'   STM to lateral, medial ankle scars and intrinsics R foot  AP mob to TC joint R ankle  Towel massage to R foot and LE for desensitization   Bike- 4'  DBE 2x1.5'  Heel taps 30 sec   Biodex balance LOS L12- 29%, 62%  4 kg KB tandem & swing R/L cw/ccw- 1' ea dir.  Leg press 40# 1x20  Wt bd DF/PF 10#/10#   IN/EV 5#/5#- 1.5' ea dir.  Hip ext 33#/33# R/L x 20 ea.   SS Incline bd Gastroc/Soleus- 1' ea.  GR Rt. Ankle/Knee (med)- 15'    Charges: TE 2, NME 1, Vaso (CQ Modifier)    Assessment:     Needs to wear tennis shoes and not sandals for the PT sessions.    Plan:   Continue to improve ROM, strength, flexibility and balance to improve gait and ADL     Lemuel Hawkins , PTA

## 2024-03-11 ENCOUNTER — TREATMENT (OUTPATIENT)
Dept: PHYSICAL THERAPY | Facility: CLINIC | Age: 34
End: 2024-03-11
Payer: COMMERCIAL

## 2024-03-11 ENCOUNTER — HOSPITAL ENCOUNTER (OUTPATIENT)
Dept: RADIOLOGY | Facility: HOSPITAL | Age: 34
Discharge: HOME | End: 2024-03-11
Payer: COMMERCIAL

## 2024-03-11 ENCOUNTER — OFFICE VISIT (OUTPATIENT)
Dept: ORTHOPEDIC SURGERY | Facility: HOSPITAL | Age: 34
End: 2024-03-11
Payer: COMMERCIAL

## 2024-03-11 VITALS — WEIGHT: 127 LBS | HEIGHT: 63 IN | BODY MASS INDEX: 22.5 KG/M2

## 2024-03-11 DIAGNOSIS — M79.674 GREAT TOE PAIN, RIGHT: Primary | ICD-10-CM

## 2024-03-11 DIAGNOSIS — R26.2 DIFFICULTY WALKING: Primary | ICD-10-CM

## 2024-03-11 DIAGNOSIS — M79.674 GREAT TOE PAIN, RIGHT: ICD-10-CM

## 2024-03-11 DIAGNOSIS — S90.111A CONTUSION OF RIGHT GREAT TOE WITHOUT DAMAGE TO NAIL, INITIAL ENCOUNTER: ICD-10-CM

## 2024-03-11 DIAGNOSIS — M25.571 RIGHT ANKLE PAIN: ICD-10-CM

## 2024-03-11 DIAGNOSIS — S93.509A TOE SPRAIN, INITIAL ENCOUNTER: ICD-10-CM

## 2024-03-11 PROCEDURE — 73660 X-RAY EXAM OF TOE(S): CPT | Mod: RIGHT SIDE | Performed by: RADIOLOGY

## 2024-03-11 PROCEDURE — 99214 OFFICE O/P EST MOD 30 MIN: CPT | Performed by: PHYSICIAN ASSISTANT

## 2024-03-11 PROCEDURE — 73660 X-RAY EXAM OF TOE(S): CPT | Mod: RT

## 2024-03-11 PROCEDURE — 97110 THERAPEUTIC EXERCISES: CPT | Mod: GP

## 2024-03-11 ASSESSMENT — PAIN SCALES - GENERAL
PAINLEVEL_OUTOF10: 7
PAINLEVEL_OUTOF10: 5 - MODERATE PAIN

## 2024-03-11 ASSESSMENT — PAIN - FUNCTIONAL ASSESSMENT: PAIN_FUNCTIONAL_ASSESSMENT: 0-10

## 2024-03-11 ASSESSMENT — PAIN DESCRIPTION - DESCRIPTORS: DESCRIPTORS: THROBBING

## 2024-03-11 NOTE — PROGRESS NOTES
Physical Therapy Treatment    Patient Name: William Moya  MRN: [id number]  Today's Date: 3/11/2024  Time Calculation  Start Time: 0920  Stop Time: 1003  Time Calculation (min): 43 min    Insurance:   Visit number: 7  Approved number of visits:  20  Insurance: grayson  Auth date 2/9/24  to 4/30/24    Current Problem  1. Difficulty walking        2. Right ankle pain  Follow Up In Physical Therapy          General  Reason for Referral: IMN R LE 11/19/23  Referred By: sherif  Precautions  Precautions  Precautions Comment: none  Pain  Pain Score: 5 - Moderate pain    Subjective:   Subjective   Patient reports feel like she may have 'broken her big toe' while coming down the stairs in shoes.  Reports she may have come down toe first.  No reports of pain or snapping but had pain and swelling right away which helped with swelling.  Also has numbness in foot which could have affected her proprioception with stair navigation.     Compliant with HEP? yes    Objective:   Objective   Ttp 1st DIP with moderate swelling R foot  Antalgic gait with standard gait lacking push off and on lateral aspect of R foot  + tapping through lateral aspect and tip of toe, + squeeze test  R great toe    Treatments:     Tape 1st ray for stability  SB bridging 12 x 2  SB HSC 10 x 2  SB  30 x   Sl flex/ext 10 x 2 B  Seated hip flexion 5# 12 x 3 B  Hip abd/add 40# 12 x 3     Assessment: may in fact have injured great toe, instructed to keep wrapped or try mayank taping 1-2 together, continue icing and resting toe, boot if needed       Plan: general strengthening balance and stability

## 2024-03-11 NOTE — PATIENT INSTRUCTIONS
YOUR BOOT INSTRUCTIONS:  You can put weight on your foot and ankle while in the boot.    You can use crutches or walker for support as needed.   You should wear boot when walking or standing   You can take off your boot while bathing, sitting, stretching, icing or doing therapy exercises.  You can take your boot off at nighttime while sleeping.    You can also use OTC Voltaren gel or  aspercreme ointment and apply it to the injured area.      Ice and elevate supported at the calf to reduce swelling    You can use a bucket of room temperature water with Epson salt and do your ankle/toe exercises    You can use a OQUENDO'S EXTENSION PLATE in a good athletic shoe under your shoe inserts    Follow up as needed

## 2024-03-11 NOTE — PROGRESS NOTES
33 year old female arrives to the walk-in clinic for concern of right great toe pain.  Hx of right tibia fracture ORIF 4 months ago.  NPV-   History of Present Illness  33 y.o.female  1. Great toe pain, right  XR toe right 2+ views      2. Toe sprain, initial encounter        3. Contusion of right great toe without damage to nail, initial encounter           Mechanism of injury: stubbed toe walking down stairs  Date of Injury/Pain: 2-3 days ago  Location of pain: right great toe  Quality of pain: 4  Frequency of Pain: worse with walking or bearing weight  Associated symptoms? Swelling.  Previous treatment? Tylenol, PT    27 point review of systems negative except what is stated in HPI    On examination of the right foot:  Normal gait  Normal alignment  Minimal swelling; No ecchymosis or erythema. Skin intact; no ulcers or lesions.   normal ROM in 2nd, 3rd, 4th and 5th toe flexion/extension and slightly decreased 1st MTP flexion/extension due to pain;   Slightly decreased strength with great toe flexion/extension due to pain  Tenderness to palpation: great toe, specifically over IP joint  No tenderness to palpation over the Achilles, medial and lateral malleolus, posterior tibial tendon, peroneal tendon, ATFL, deltoid ligament, talus or navicular.  no tenderness to palpation over heel, plantar arch, base of 1st metatarsal/2nd metatarsal, sesamoids, 5th metatarsal, medial cuneiform, navicular, 1st MTP joint or 2nd or 3rd intermetarsal space.  Neurovascularly intact. Good capillary refill. No sensory deficit to light touch. Normal proprioception. Dorsalis pedis and posterior tibial pulses 2+ bilaterally.                    - negative vertical lachman's  - negative shuck testing              I personally reviewed the patient's x-ray images and reports of the right foot. The xrays show no fractures or dislocation.  Normal joint spacing    ASSESSMENT: right foot Great toe sprain     PLAN: Patient was advised to start  wearing a CAM walker boot, which they already had at home. Prevent extension of the toe which may worsen sx, can use OTC carbon fiber plate in their shoe once ready to wean out of the boot. Patient was given a handout and instructed on an at home stretching program. They should do these exercises 3 times per day for 6 weeks and then daily. Patient already doing PT, continue but now with an additional focus on the toe sprain. Patient can use OTC Voltaren gel, biofreeze or  aspercream for pain and continue to ice and elevate supported at the calf to reduce swelling. Patient will increase their dietary calcium intake (milk,yogurt) and should get 1200 mg of calcium per day. They will also take a vitamin D supplement. All the patient's questions were answered. The patient agrees with the above plan. Follow up as needed.    Desiree LONG

## 2024-03-14 ENCOUNTER — TREATMENT (OUTPATIENT)
Dept: PHYSICAL THERAPY | Facility: CLINIC | Age: 34
End: 2024-03-14
Payer: COMMERCIAL

## 2024-03-14 DIAGNOSIS — M25.571 RIGHT ANKLE PAIN: ICD-10-CM

## 2024-03-14 PROCEDURE — 97140 MANUAL THERAPY 1/> REGIONS: CPT | Mod: GP

## 2024-03-14 PROCEDURE — 97110 THERAPEUTIC EXERCISES: CPT | Mod: GP

## 2024-03-14 ASSESSMENT — PAIN SCALES - GENERAL: PAINLEVEL_OUTOF10: 6

## 2024-03-14 NOTE — PROGRESS NOTES
Physical Therapy Treatment    Patient Name: William Moya  MRN: [id number]  Today's Date: 3/14/2024  Time Calculation  Start Time: 1005  Stop Time: 1050  Time Calculation (min): 45 min    Insurance:   Visit number: 8  Approved number of visits:  20  Insurance: grayson  Auth date 2/9/24  to 4/30/24    Current Problem  1. Right ankle pain  Follow Up In Physical Therapy          General  Reason for Referral: IMN R LE 11/19/23  Referred By: sherif  Precautions  Precautions  Precautions Comment: none  Pain  Pain Score: 6    Subjective:   Subjective   Patient reports having a lot of pain in ankle and knee.  Pain about 6.5/10 pain in ankle and in knee not the break.  Been doing more and having more nerve pain sharp and burning.  Having to use creams and wrap leg to help with pain - feels very deep no on the inside.      Compliant with HEP? Yes     Objective:   Objective     Ttp anterior ankle, peroneals    Treatments:   Bike 5 min  Incline stretch 1 min  STM to anterior tib, peroneals, lateral ankle R   IV ankle stretch 1 min x 2 R  Multihip 36# 12 x 2 B 3 way  X walks [place] 20' x 2       Assessment: some of her may be coming from her hardware, but does also have significant tightness in surrounding soft tissue and encouraged continued STM.       Plan: balance stability,

## 2024-04-03 ENCOUNTER — DOCUMENTATION (OUTPATIENT)
Dept: PHYSICAL THERAPY | Facility: CLINIC | Age: 34
End: 2024-04-03
Payer: COMMERCIAL

## 2024-04-03 ENCOUNTER — APPOINTMENT (OUTPATIENT)
Dept: PHYSICAL THERAPY | Facility: CLINIC | Age: 34
End: 2024-04-03
Payer: COMMERCIAL

## 2024-04-03 NOTE — PROGRESS NOTES
"Physical Therapy                 Therapy Communication Note    Patient Name: William Moya \"Aleksander\"  MRN: [id number]  Today's Date: 4/3/2024     Discipline: Physical Therapy    Missed Visit Reason:      Missed Time: Cancel    Comment:  "

## 2024-04-05 ENCOUNTER — TREATMENT (OUTPATIENT)
Dept: PHYSICAL THERAPY | Facility: CLINIC | Age: 34
End: 2024-04-05
Payer: COMMERCIAL

## 2024-04-05 DIAGNOSIS — M25.571 RIGHT ANKLE PAIN: ICD-10-CM

## 2024-04-05 DIAGNOSIS — R26.2 DIFFICULTY WALKING: Primary | ICD-10-CM

## 2024-04-05 PROCEDURE — 97110 THERAPEUTIC EXERCISES: CPT | Mod: GP

## 2024-04-05 ASSESSMENT — PAIN SCALES - GENERAL: PAINLEVEL_OUTOF10: 7

## 2024-04-05 NOTE — PROGRESS NOTES
Physical Therapy Treatment    Patient Name: William Moya  MRN: [id number]  Today's Date: 4/5/2024  Time Calculation  Start Time: 0703  Stop Time: 0748  Time Calculation (min): 45 min    Insurance:   Visit number: 9  Approved number of visits:  20  Insurance: grayson  Auth date 2/9/24  to 4/30/24    Current Problem  1. Difficulty walking        2. Right ankle pain  Follow Up In Physical Therapy          General  Reason for Referral: IMN R LE 11/19/23  Referred By: sherif  Precautions  Precautions  Precautions Comment: none  Pain  Pain Score: 7    Subjective:   Subjective   Patient reports trying to get in the MD sooner than scheduled to discuss the amount of pain she is in and the fact that the pain meds they provided aren't helping her.  She is having ankle burning pain and her knee/shin is very painful that it keeps her from sleeping and wakes her up as well.  Rates pain this am 7/10.    Compliant with HEP? yes    Objective:   Objective   Antalgic gait with std cane    Treatments:     Bike 4 min  Prone quad stretch 1 min x 3 R  Seated hip flexion 3# 12 x 3 R  LAQ 3# 12 x 2  HSC 20# 12 x 3  Hip abd/add 32.5# 12 x 3      Assessment: reported feeling better after treatment.  May benefit from seeing MD sooner to discuss pain levels and management.  Hypersensitive with light touch to shin.       Plan: general strengthening quads hips gluts

## 2024-04-09 ENCOUNTER — TREATMENT (OUTPATIENT)
Dept: PHYSICAL THERAPY | Facility: CLINIC | Age: 34
End: 2024-04-09
Payer: COMMERCIAL

## 2024-04-09 DIAGNOSIS — M25.571 RIGHT ANKLE PAIN: ICD-10-CM

## 2024-04-09 PROCEDURE — 97110 THERAPEUTIC EXERCISES: CPT | Mod: GP

## 2024-04-09 ASSESSMENT — PAIN SCALES - GENERAL: PAINLEVEL_OUTOF10: 5 - MODERATE PAIN

## 2024-04-09 NOTE — PROGRESS NOTES
Physical Therapy Treatment    Patient Name: William Moya  MRN: [id number]  Today's Date: 4/9/2024  Time Calculation  Start Time: 0930  Stop Time: 1015  Time Calculation (min): 45 min    Insurance:   Visit number: 10  Approved number of visits:  20  Insurance: grayson  Auth date 2/9/24  to 4/30/24    Current Problem  1. Right ankle pain  Follow Up In Physical Therapy          General  Reason for Referral: IMN R LE 11/19/23  Referred By: sherif  Precautions  Precautions  Precautions Comment: none  Pain  Pain Score: 5 - Moderate pain    Subjective:   Subjective   Patient reports having sleepless nights which are getting to her,  feels like leg is on fire and burning.  Wearing the boot at times for her ankle.  Pain today about 5/10.  Despite pain is still doing stretching and     Compliant with HEP? yes    Objective:   Objective   Amb in shoes carrying a boot.      Treatments:   Bike 5 min  Heel slides  15 x  DKTC 1 min  Bridging 15 x 3 sec hold  SL flexion ext 10 x 2 R  Calf raise 3 way 20 x   Tandem balance kb pass cw & ccw 20 x R/L, L/R  Leg press 70# 12 x 3    Assessment: despite pain and numbness she is progressing well.  Recommended she try to call MD again to get in soon to discuss pain management options as that seems to be the main thing she is c/o.        Plan: general strengthening

## 2024-04-12 ENCOUNTER — TREATMENT (OUTPATIENT)
Dept: PHYSICAL THERAPY | Facility: CLINIC | Age: 34
End: 2024-04-12
Payer: COMMERCIAL

## 2024-04-12 DIAGNOSIS — M25.571 RIGHT ANKLE PAIN: ICD-10-CM

## 2024-04-12 DIAGNOSIS — R26.2 DIFFICULTY WALKING: Primary | ICD-10-CM

## 2024-04-12 PROCEDURE — 97112 NEUROMUSCULAR REEDUCATION: CPT | Mod: GP

## 2024-04-12 PROCEDURE — 97110 THERAPEUTIC EXERCISES: CPT | Mod: GP

## 2024-04-12 ASSESSMENT — PAIN SCALES - GENERAL: PAINLEVEL_OUTOF10: 6

## 2024-04-12 NOTE — PROGRESS NOTES
Physical Therapy Treatment    Patient Name: William Moya  MRN: [id number]  Today's Date: 4/12/2024  Time Calculation  Start Time: 0915  Stop Time: 1000  Time Calculation (min): 45 min    Insurance:   Visit number: 11  Approved number of visits:  20  Insurance: [employer]  Auth date 2/9/24  to 4/30/24    Current Problem  1. Difficulty walking        2. Right ankle pain  Follow Up In Physical Therapy          General  Reason for Referral: IMN R LE 11/19/23  Referred By: sherif  Precautions  Precautions  Precautions Comment: none  Pain  Pain Score: 6    Subjective:   Subjective   Knee is giving her some issues.   Feels like driving is putting more pressure on her knee since she can't feel the foot is compensating by using the whole leg.   Pain today about 6/10.      Compliant with HEP? yes    Objective:   Objective   Amb in shoes    Treatments:   Bike 5 min  Hip machine 3 way 36# 12 x 2 B  Leg press 80# 12 x 3   1/2 foam rebounder toss 25 x   Tandem balance kb pass cw & ccw 20 x R/L, L/R      Assessment: despite pain and numbness she is progressing well.        Plan: general strengthening

## 2024-04-16 ENCOUNTER — TREATMENT (OUTPATIENT)
Dept: PHYSICAL THERAPY | Facility: CLINIC | Age: 34
End: 2024-04-16
Payer: COMMERCIAL

## 2024-04-16 DIAGNOSIS — M25.571 RIGHT ANKLE PAIN: ICD-10-CM

## 2024-04-16 DIAGNOSIS — R26.2 DIFFICULTY WALKING: Primary | ICD-10-CM

## 2024-04-16 PROCEDURE — 97112 NEUROMUSCULAR REEDUCATION: CPT | Mod: GP

## 2024-04-16 PROCEDURE — 97110 THERAPEUTIC EXERCISES: CPT | Mod: GP

## 2024-04-16 ASSESSMENT — PAIN SCALES - GENERAL: PAINLEVEL_OUTOF10: 6

## 2024-04-16 NOTE — PROGRESS NOTES
Physical Therapy Treatment    Patient Name: William Moya  MRN: [id number]  Today's Date: 4/16/2024  Time Calculation  Start Time: 0930  Stop Time: 1015  Time Calculation (min): 45 min    Insurance:   Visit number: 12  Approved number of visits:  20  [facility]  Auth date 2/9/24  to 4/30/24    Current Problem  1. Difficulty walking        2. Right ankle pain              General  Reason for Referral: IMN R LE 11/19/23  Referred By: sherif  Precautions  Precautions  Precautions Comment: none  Pain  Pain Score: 6    Subjective:   Subjective   Knee is giving her some issues.   Feels like driving is putting more pressure on her knee since she can't feel the foot is compensating by using the whole leg.   Pain today about 6/10.      Compliant with HEP? yes    Objective:   Objective   Amb in shoes    Treatments:   Tape knee for PFPS  Wall squats 10 x 2   sPlit squats 12 z 2  Resisted walking 10 x  4 way  Leg press 80# 12 x 3, unilateral 30# 15 x B    Assessment:  decreased knee pain with KT tape, seems to have tracking issue of knee coupled with distal quad atrophy causing her knee pain.       Plan: general strengthening

## 2024-04-18 ENCOUNTER — TREATMENT (OUTPATIENT)
Dept: PHYSICAL THERAPY | Facility: CLINIC | Age: 34
End: 2024-04-18
Payer: COMMERCIAL

## 2024-04-18 DIAGNOSIS — M25.571 RIGHT ANKLE PAIN: ICD-10-CM

## 2024-04-18 DIAGNOSIS — R26.2 DIFFICULTY WALKING: Primary | ICD-10-CM

## 2024-04-18 PROCEDURE — 97110 THERAPEUTIC EXERCISES: CPT | Mod: GP

## 2024-04-18 PROCEDURE — 97112 NEUROMUSCULAR REEDUCATION: CPT | Mod: GP

## 2024-04-18 ASSESSMENT — PAIN SCALES - GENERAL: PAINLEVEL_OUTOF10: 7

## 2024-04-18 NOTE — PROGRESS NOTES
Physical Therapy Treatment    Patient Name: William Moya  MRN: [id number]  Today's Date: 4/18/2024  Time Calculation  Start Time: 0915  Stop Time: 1000  Time Calculation (min): 45 min    Insurance:   Visit number: 12  Approved number of visits:  20  Insurance: careadolph  Auth date 2/9/24  to 4/30/24    Current Problem  1. Difficulty walking        2. Right ankle pain                General  Reason for Referral: IMN R LE 11/19/23  Referred By: sherif  Precautions  Precautions  Precautions Comment: none  Pain  Pain Score: 7    Subjective:   Subjective   Knee is giving her some issues.   Feels like driving is putting more pressure on her knee since she can't feel the foot is compensating by using the whole leg.   Pain today about 6/10.      Compliant with HEP? yes    Objective:   Objective   Amb in shoes    Treatments:   Tape knee for PFPS  Bike 5 min  Hip machine 3 way 33# 12 x 2 B  HSC 25# 12 x 3  1/2 foam rebounder toss 2 way wide stance  Lunges 2 airex 10 x   Leg press 80# 12 x 3, unilateral 30# 15 x B    Assessment:  decreased knee pain with KT tape, seems to have tracking issue of knee coupled with distal quad atrophy causing her knee pain.       Plan: general strengthening

## 2024-04-23 ENCOUNTER — TREATMENT (OUTPATIENT)
Dept: PHYSICAL THERAPY | Facility: CLINIC | Age: 34
End: 2024-04-23
Payer: COMMERCIAL

## 2024-04-23 DIAGNOSIS — M25.571 RIGHT ANKLE PAIN: Primary | ICD-10-CM

## 2024-04-23 DIAGNOSIS — R26.2 DIFFICULTY WALKING: ICD-10-CM

## 2024-04-23 PROCEDURE — 97110 THERAPEUTIC EXERCISES: CPT | Mod: GP

## 2024-04-23 PROCEDURE — 97140 MANUAL THERAPY 1/> REGIONS: CPT | Mod: GP

## 2024-04-23 ASSESSMENT — PAIN SCALES - GENERAL: PAINLEVEL_OUTOF10: 7

## 2024-04-23 NOTE — PROGRESS NOTES
Physical Therapy Treatment    Patient Name: William Moya  MRN: [id number]  Today's Date: 4/23/2024  Time Calculation  Start Time: 0940  Stop Time: 1021  Time Calculation (min): 41 min    Insurance:   Visit number: 13  Approved number of visits:  20  Insurance: grayson  Auth date 2/9/24  to 4/30/24    Current Problem  1. Right ankle pain        2. Difficulty walking            General  Reason for Referral: IMN R LE 11/19/23  Referred By: sherif  Precautions  Precautions  Precautions Comment: none  Pain  Pain Score: 7    Subjective:   Subjective   Patient reports in a lot of pain today feels like may be due to adding in new HEP.  Trying to push through the pain but harder at night time, doubling up on the gabapentin but still waking up with sharp pain.   Still having issues with gabapentin but has to take it to ease the pain.     Compliant with HEP? yes    Objective:   Objective     Antalgic gait   Treatments:     Bike 5 min  Prone quad stretch with strap 2 x 1 min R  Figure 4 1 min x 2 R  rolling pin IT band  and STM to HS  R   Hip abd/add 32.5# 12 x 3  Leg press 80# 12 x 3    Assessment: lot of pain today.  Encouraged self STM to IT and medial HS to see if tightness in musculature affecting knee.        Plan: progress resistance

## 2024-04-25 ENCOUNTER — TREATMENT (OUTPATIENT)
Dept: PHYSICAL THERAPY | Facility: CLINIC | Age: 34
End: 2024-04-25
Payer: COMMERCIAL

## 2024-04-25 DIAGNOSIS — R26.2 DIFFICULTY WALKING: ICD-10-CM

## 2024-04-25 DIAGNOSIS — M25.571 RIGHT ANKLE PAIN: Primary | ICD-10-CM

## 2024-04-25 PROCEDURE — 97110 THERAPEUTIC EXERCISES: CPT | Mod: GP

## 2024-04-25 PROCEDURE — 97112 NEUROMUSCULAR REEDUCATION: CPT | Mod: GP

## 2024-04-25 ASSESSMENT — PAIN SCALES - GENERAL: PAINLEVEL_OUTOF10: 6

## 2024-04-25 NOTE — PROGRESS NOTES
Physical Therapy Treatment    Patient Name: William Moya  MRN: [id number]  Today's Date: 4/25/2024  Time Calculation  Start Time: 0920  Stop Time: 1004  Time Calculation (min): 44 min    Insurance:     Visit number: 14  Approved number of visits:  20  Insurance: careadolph  Auth date 2/9/24  to 4/30/24  Current Problem  1. Right ankle pain        2. Difficulty walking            General  Reason for Referral: IMN R LE 11/19/23  Referred By: sherif  Precautions  Precautions  Precautions Comment: none  Pain  Pain Score: 6    Subjective:   Subjective   Patient reports having hard time standing and putting weight down in leg.  Stiffness in knee also a problem.  Yesterday was a bad day had to wrap ankle due to pain.     Compliant with HEP? yes    Objective:   Objective     Antalgic gait at times  Treatments:   Upright bike 5 min  Bosu lunges 20 x B  Bosu step up 2 x 12 B  Valslide groin 15 x 2 way  Toe walk 3 x around gym  Heel walk 3 x around gym  1/2 foam rebounder toss 25 x    Assessment: overall function good able to jog at periods of time but stopped frequently with some exercises due to pain.  Pain still limits her WB for long periods of time.         Plan: reassess

## 2024-04-29 ENCOUNTER — TREATMENT (OUTPATIENT)
Dept: PHYSICAL THERAPY | Facility: CLINIC | Age: 34
End: 2024-04-29
Payer: COMMERCIAL

## 2024-04-29 DIAGNOSIS — R26.2 DIFFICULTY WALKING: ICD-10-CM

## 2024-04-29 DIAGNOSIS — M25.571 RIGHT ANKLE PAIN: Primary | ICD-10-CM

## 2024-04-29 PROCEDURE — 97110 THERAPEUTIC EXERCISES: CPT | Mod: GP

## 2024-04-29 ASSESSMENT — PAIN SCALES - GENERAL: PAINLEVEL_OUTOF10: 5 - MODERATE PAIN

## 2024-04-29 NOTE — PROGRESS NOTES
"Physical Therapy Treatment    Patient Name: William Moya  MRN: [id number]  Today's Date: 5/7/2024  Time Calculation  Start Time: 0930  Stop Time: 1018  Time Calculation (min): 48 min    Insurance:     Visit number: 15  Approved number of visits:  20  Insurance: grayson  Auth date 2/9/24  to 4/30/24  Current Problem  1. Right ankle pain        2. Difficulty walking            General  Reason for Referral: IMN R LE 11/19/23  Referred By: sherif  Precautions  Precautions  Precautions Comment: none  Pain  Pain Score: 5 - Moderate pain    Subjective:   Subjective   Pt reports continuing to have sporadic sharp pain from knee to ankle, pain is worst at night.   Pt states she was doing walking and standing for ~35 minutes yesterday, pain became unbearable. Started wearing her ankle boot for the first time in about 3 weeks afterwards and had some pain relief. Frustration with ongoing intensity of pain. Always ambulates with cane. Signed up for a gym membership and purchased a muscle rolling pin    Compliant with HEP? Yes, everyday    Objective:   Objective   Ambulating with cane  Lidocane patches over incision sites and compression sleeve on knee with some symptom relief  Hypersensitive below mid shin through top of foot    Knee ROM  R -2-134  L  2-0-142    Ankle ROM  DF 8/8  PF 47/53  Inv 35/38  Ev 18/22    LE strength  Hip flexion 4+/4+  Hip abd 5/5  Hip add 5/5  Quads 5/5  HS 5/5    Ankle MMT all 5/5 R all with pain    Treatments:   Upright bike 5 min  Seated hip flexion 4# 3 x 12  Hs curl 30# 3 x 12  Step up 6\" 2 x 10  Leg press 85# 3 x 10    Assessment:    Pt displays increased R hamstring strength. All R ankle ROM increased since eval. Painful with all R ROM and MMT. Pt completes exercises w/ good form, reports mild muscle fatigue with exercises and is hesitant to increase resistance due to pain in knee and ankle. Pt has reached end of POC. Achieved goals of improved R ankle MMT and ROM R = L. Pt continues to be " limited with ADLs, sleeping, and participation due to s/p surgery and nerve pain symptoms. Pt encouraged to continue all LE strengthening and exercises performed during tx at the gym. Pt displays good understanding of HEP, and has good motivation to continue independent exercise.     Plan:   Pt would benefit from continued communication with MD to discuss further options for pain management. Discharge to gym routine.

## 2024-05-10 ENCOUNTER — HOSPITAL ENCOUNTER (OUTPATIENT)
Dept: RADIOLOGY | Facility: HOSPITAL | Age: 34
Discharge: HOME | End: 2024-05-10
Payer: COMMERCIAL

## 2024-05-10 ENCOUNTER — OFFICE VISIT (OUTPATIENT)
Dept: ORTHOPEDIC SURGERY | Facility: HOSPITAL | Age: 34
End: 2024-05-10
Payer: COMMERCIAL

## 2024-05-10 DIAGNOSIS — S82.201S FRACTURE OF TIBIA WITH FIBULA, RIGHT, OPEN, SEQUELA: ICD-10-CM

## 2024-05-10 DIAGNOSIS — S82.201S FRACTURE OF TIBIA WITH FIBULA, RIGHT, OPEN, SEQUELA: Primary | ICD-10-CM

## 2024-05-10 DIAGNOSIS — S82.401S FRACTURE OF TIBIA WITH FIBULA, RIGHT, OPEN, SEQUELA: Primary | ICD-10-CM

## 2024-05-10 DIAGNOSIS — S82.401S FRACTURE OF TIBIA WITH FIBULA, RIGHT, OPEN, SEQUELA: ICD-10-CM

## 2024-05-10 PROCEDURE — 73590 X-RAY EXAM OF LOWER LEG: CPT | Mod: RT

## 2024-05-10 PROCEDURE — 99214 OFFICE O/P EST MOD 30 MIN: CPT | Performed by: ORTHOPAEDIC SURGERY

## 2024-05-10 PROCEDURE — L1902 AFO ANKLE GAUNTLET PRE OTS: HCPCS | Performed by: ORTHOPAEDIC SURGERY

## 2024-05-10 PROCEDURE — 73590 X-RAY EXAM OF LOWER LEG: CPT | Mod: RIGHT SIDE | Performed by: STUDENT IN AN ORGANIZED HEALTH CARE EDUCATION/TRAINING PROGRAM

## 2024-05-10 ASSESSMENT — PAIN - FUNCTIONAL ASSESSMENT: PAIN_FUNCTIONAL_ASSESSMENT: 0-10

## 2024-05-10 ASSESSMENT — PAIN SCALES - GENERAL: PAINLEVEL_OUTOF10: 6

## 2024-05-10 NOTE — PROGRESS NOTES
History of Present Illness   William Abrams is a 34 y.o. female presenting today for follow up from IMN open R tib/fib fx on 23.  She had a really tough time with pain.  She had a fibular neck injury that seems to have stretcher, peroneal nerve and she is has lateral sided paresthesias and pain and weakness in her ankle.  She also had some wound healing issues due to her open injury early on that has resolved.  She has been back to work for a week but still needs her cane for stairs and long distances.  She also sometimes needs her boot if her leg fatigues but she has no structural compromise of her leg at this point.     Past Medical History:   Diagnosis Date    Disease of stomach and duodenum, unspecified     Stomach problems    Encounter for gynecological examination (general) (routine) without abnormal findings 2016    Well woman exam    Headache, unspecified 2013    Headache    Other conditions influencing health status     Menstruation    Other conditions influencing health status     History of pregnancy    Presence of (intrauterine) contraceptive device     IUD contraception       Medication Documentation Review Audit       Reviewed by Jaclyn Solis MA (Medical Assistant) on 05/10/24 at 1204      Medication Order Taking? Sig Documenting Provider Last Dose Status   cyclobenzaprine (Flexeril) 10 mg tablet [id number]  Take 1 tablet (10 mg) by mouth 3 times a day as needed for muscle spasms for up to 7 days. Kimberly Escobar PA-C   24 2359   docusate sodium (Colace) 100 mg capsule [id number] No Take 1 capsule (100 mg) by mouth 2 times a day. Historical Provider, MD Not Taking Active   gabapentin (Neurontin) 800 mg tablet [id number]  Take 1 tablet (800 mg) by mouth 3 times a day. Kimberly Escobar PA-C  Active   levonorgestreL (Liletta) 20.4 mcg/24 hrs (8 yrs) 52 mg intrauterine device [id number] No 52 mg by intrauterine route once daily. Historical Provider, MD Not Taking Active    loratadine (Claritin) 10 mg tablet [id number] No Take 1 tablet (10 mg) by mouth once daily. Historical Provider, MD Not Taking Active   NON FORMULARY [id number]  Take 1 each by mouth once daily. Tumeric suppliment Historical Provider, MD  Active   oxyCODONE (Roxicodone) 5 mg immediate release tablet [id number]  Take 5 mg by mouth every 6 hours. Indications: pain Historical Provider, MD  Active   pantoprazole (ProtoNix) 40 mg EC tablet [id number] No Take 1 tablet by mouth once daily. Historical Provider, MD Taking  01/10/24 2359                    Allergies   Allergen Reactions    Latex Unknown       Social History     Socioeconomic History    Marital status:      Spouse name: Not on file    Number of children: Not on file    Years of education: Not on file    Highest education level: Not on file   Occupational History    Not on file   Tobacco Use    Smoking status: Some Days     Types: Cigarettes    Smokeless tobacco: Not on file   Vaping Use    Vaping status: Never Used   Substance and Sexual Activity    Alcohol use: Not Currently    Drug use: Never    Sexual activity: Not on file   Other Topics Concern    Not on file   Social History Narrative    Not on file     Social Determinants of Health     Financial Resource Strain: Low Risk  (2023)    Overall Financial Resource Strain (CARDIA)     Difficulty of Paying Living Expenses: Not hard at all   Food Insecurity: No Food Insecurity (2023)    Hunger Vital Sign     Worried About Running Out of Food in the Last Year: Never true     Ran Out of Food in the Last Year: Never true   Transportation Needs: No Transportation Needs (2024)    OASIS : Transportation     Lack of Transportation (Medical): No     Lack of Transportation (Non-Medical): No     Patient Unable or Declines to Respond: No   Physical Activity: Insufficiently Active (2023)    Exercise Vital Sign     Days of Exercise per Week: 4 days     Minutes of Exercise per Session:  30 min   Stress: No Stress Concern Present (11/19/2023)    [employer] - Occupational Stress Questionnaire     Feeling of Stress : Not at all   Social Connections: Feeling Socially Integrated (1/26/2024)    OASIS : Social Isolation     Frequency of experiencing loneliness or isolation: Never   Recent Concern: Social Connections - Socially Isolated (11/19/2023)    Social Connection and Isolation Panel [NHANES]     Frequency of Communication with Friends and Family: Once a week     Frequency of Social Gatherings with Friends and Family: Once a week     Attends Rastafari Services: Never     Active Member of Clubs or Organizations: No     Attends Club or Organization Meetings: Never     Marital Status:    Intimate Partner Violence: Not At Risk (11/19/2023)    Humiliation, Afraid, Rape, and Kick questionnaire     Fear of Current or Ex-Partner: No     Emotionally Abused: No     Physically Abused: No     Sexually Abused: No   Housing Stability: Low Risk  (11/19/2023)    Housing Stability Vital Sign     Unable to Pay for Housing in the Last Year: No     Number of Places Lived in the Last Year: 2     Unstable Housing in the Last Year: No       History reviewed. No pertinent surgical history.       Review of Systems:  30 point ROS reviewed and negative other than as listed in the HPI     Physical Exam:  Gen: The pt is A&Ox3, NAD, and appear state age and weight  Psychiatric: mood and affect are appropriate   Eyes: sclera are white, EOM grossly intact  ENT: MMM  Neck: supple, thyroid is midline  Respiratory: respirations are nonlabored, chest rise symmetric  CV: rate is regular by palpation of distal pulses  Abdomen: nondistended   Integument: no obvious cutaneous lesions noted. No signs of lymphangitis. No signs of systemic edema.   MSK:  RLE  surgical incision well healing without edema, erythema, drainage, or other s/sx of infection. Her anterior shin wound from site of open fx has a  wound is healed now.  She has active dorsiflexion and plantarflexion but has some nondermatomal areas of paresthesias in her leg.  Foot warm and well perfused.      Imaging:  I personally reviewed multiple views of the  R tib/fib  were obtained in the office today demonstrate maintenance of reduction, interval healing, and a stable position of the hardware.  Injury films were reviewed demonstrate a highly displaced and angulated fibular neck injury with a rotational component to her fracture as her foot was more than 90 degrees rotated compared to her knee.     Assessment   34 y.o. female post-op from IMN open R tib/fib fx on 11/19/23.     Plan:  Continue WBAT on RLE and ROM as tolerated  Continue therapy  She can continue to be at work but although she has no formal weightbearing or motion restrictions she still may need to use her cane to help with stairs in her boot and brace at times to help if she becomes fatigued through this period.  I will also send her to see Dr. 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  He may be a candidate for nerve block or infusion therapy or long-term medical management.  We have tried several medications through my office and she still is in significant amount of pain that is highly neurogenic in nature.  She can follow-up 6 months with 2 views R tib/fib with me.  She also be provided with a lace up ankle brace to help stabilize her ankle.  Patient was prescribed a lace up ankle brace for open tibia fracture.The patient is ambulatory with or without aid; but, has weakness, instability and/or deformity of their right lower extremity which requires stabilization from this orthosis to improve their function.      Verbal and written instructions for the use, wear schedule, cleaning and application of this item were given.  Patient was instructed that should the orthotic device result in increased pain, decreased sensation, increased swelling, or an overall  worsening of their medical condition, to please contact our office immediately.     Orthotic management and training was provided for skin care, modifications due to healing tissues, edema changes, interruption in skin integrity, and safety precautions with the orthosis.      All of the patient's questions/concerns address and they are in agreement with the plan.

## 2024-05-22 ENCOUNTER — APPOINTMENT (OUTPATIENT)
Dept: ORTHOPEDIC SURGERY | Facility: CLINIC | Age: 34
End: 2024-05-22
Payer: COMMERCIAL

## 2024-06-02 ENCOUNTER — APPOINTMENT (OUTPATIENT)
Dept: RADIOLOGY | Facility: HOSPITAL | Age: 34
End: 2024-06-02
Payer: COMMERCIAL

## 2024-06-02 ENCOUNTER — HOSPITAL ENCOUNTER (EMERGENCY)
Facility: HOSPITAL | Age: 34
Discharge: HOME | End: 2024-06-02
Payer: COMMERCIAL

## 2024-06-02 VITALS
HEART RATE: 78 BPM | RESPIRATION RATE: 16 BRPM | SYSTOLIC BLOOD PRESSURE: 121 MMHG | TEMPERATURE: 98.2 F | HEIGHT: 62 IN | OXYGEN SATURATION: 100 % | BODY MASS INDEX: 23 KG/M2 | WEIGHT: 125 LBS | DIASTOLIC BLOOD PRESSURE: 79 MMHG

## 2024-06-02 DIAGNOSIS — S82.839A CLOSED FRACTURE OF DISTAL END OF FIBULA, UNSPECIFIED FRACTURE MORPHOLOGY, INITIAL ENCOUNTER: Primary | ICD-10-CM

## 2024-06-02 PROCEDURE — 29515 APPLICATION SHORT LEG SPLINT: CPT

## 2024-06-02 PROCEDURE — 73630 X-RAY EXAM OF FOOT: CPT | Mod: LT

## 2024-06-02 PROCEDURE — 99284 EMERGENCY DEPT VISIT MOD MDM: CPT | Mod: 25

## 2024-06-02 PROCEDURE — 73630 X-RAY EXAM OF FOOT: CPT | Mod: LEFT SIDE | Performed by: RADIOLOGY

## 2024-06-02 PROCEDURE — 2500000001 HC RX 250 WO HCPCS SELF ADMINISTERED DRUGS (ALT 637 FOR MEDICARE OP)

## 2024-06-02 PROCEDURE — 73610 X-RAY EXAM OF ANKLE: CPT | Mod: LEFT SIDE | Performed by: RADIOLOGY

## 2024-06-02 PROCEDURE — 73610 X-RAY EXAM OF ANKLE: CPT | Mod: LT

## 2024-06-02 RX ORDER — ACETAMINOPHEN 325 MG/1
650 TABLET ORAL ONCE
Status: DISCONTINUED | OUTPATIENT
Start: 2024-06-02 | End: 2024-06-02 | Stop reason: HOSPADM

## 2024-06-02 RX ORDER — ACETAMINOPHEN 325 MG/1
975 TABLET ORAL ONCE
Status: COMPLETED | OUTPATIENT
Start: 2024-06-02 | End: 2024-06-02

## 2024-06-02 RX ORDER — IBUPROFEN 400 MG/1
400 TABLET ORAL ONCE
Status: COMPLETED | OUTPATIENT
Start: 2024-06-02 | End: 2024-06-02

## 2024-06-02 RX ADMIN — IBUPROFEN 400 MG: 400 TABLET, FILM COATED ORAL at 13:48

## 2024-06-02 RX ADMIN — ACETAMINOPHEN 975 MG: 325 TABLET ORAL at 15:26

## 2024-06-02 ASSESSMENT — PAIN DESCRIPTION - DESCRIPTORS: DESCRIPTORS: SHARP

## 2024-06-02 ASSESSMENT — COLUMBIA-SUICIDE SEVERITY RATING SCALE - C-SSRS
2. HAVE YOU ACTUALLY HAD ANY THOUGHTS OF KILLING YOURSELF?: NO
1. IN THE PAST MONTH, HAVE YOU WISHED YOU WERE DEAD OR WISHED YOU COULD GO TO SLEEP AND NOT WAKE UP?: NO
6. HAVE YOU EVER DONE ANYTHING, STARTED TO DO ANYTHING, OR PREPARED TO DO ANYTHING TO END YOUR LIFE?: NO

## 2024-06-02 ASSESSMENT — PAIN DESCRIPTION - ONSET: ONSET: ONGOING

## 2024-06-02 ASSESSMENT — PAIN - FUNCTIONAL ASSESSMENT: PAIN_FUNCTIONAL_ASSESSMENT: 0-10

## 2024-06-02 ASSESSMENT — PAIN DESCRIPTION - FREQUENCY: FREQUENCY: CONSTANT/CONTINUOUS

## 2024-06-02 ASSESSMENT — PAIN DESCRIPTION - LOCATION: LOCATION: ANKLE

## 2024-06-02 ASSESSMENT — PAIN DESCRIPTION - PAIN TYPE: TYPE: ACUTE PAIN

## 2024-06-02 ASSESSMENT — PAIN DESCRIPTION - ORIENTATION: ORIENTATION: LEFT

## 2024-06-02 ASSESSMENT — PAIN SCALES - GENERAL: PAINLEVEL_OUTOF10: 8

## 2024-06-02 NOTE — Clinical Note
William Abrams was seen and treated in our emergency department on 6/2/2024.  She may return to work on 06/09/2024.       If you have any questions or concerns, please don't hesitate to call.      Candice Arora PA-C

## 2024-06-02 NOTE — Clinical Note
William Abrams was seen and treated in our emergency department on 6/2/2024.  She may return to work on 06/07/2024.       If you have any questions or concerns, please don't hesitate to call.      Candice Arora PA-C

## 2024-06-02 NOTE — ED TRIAGE NOTES
Patient arrived to ED with swollen left ankle and a limp, following a fall approximately 2hrs prior. Patient stated pain is at 8/10, felt cracking in the ankle. no history of previous falls. No LOC, GCS 15

## 2024-06-02 NOTE — ED PROVIDER NOTES
HPI   Chief Complaint   Patient presents with    Ankle Pain     Swollen left ankle, following injury       HPI  HPI: This is 34 y.o. female who presents to the ER complaining of a left ankle injury.  Patient states that she tripped on an object in her home, mechanical fall, twisted the left ankle, and felt a crack at the lateral aspect of the ankle.  States it has become swollen and is painful.  She has been able to ambulate on the extremity but has pain with weightbearing.  She denies any pain proximal to the ankle.  Denies any wounds or bleeding, no bruising.  No numbness, tingling, or weakness to the extremity.  She is able to range the ankle though states it hurts.  No pain with range of motion of the toes, knee, or hip.  She denies any other injuries, did not fully fall to the ground.  She states that she does have a history of a right ankle fracture and wears a brace on the right ankle chronically, though does not have pain in the right ankle.  She denies any chance of pregnancy.  No medications taken prior to arrival.  No other complaints or symptoms voiced.    ROS:  General: No decreased responsiveness, no fever, chills  Neuro: no numbness or tingling  ENMT: No nosebleed  Eyes: No discharge or redness  Skel: + left ankle pain, no neck or back pain  Cardiac: No chest pain   Resp: No shortness of breath  GI: No abdominal pain  Skin: no rash or wounds, no erythema or ecchymosis  Heme: no bleeding or petechiae    PMH: reviewed  Social History: no smoker, no EtOH, no drugs  Family History: Noncontributory    Physical Exam:  General: Vital signs stable, Pt is alert, no acute distress  Eyes: Conjunctiva normal, EOMs intact  HENMT: Normocephalic, atraumatic.  Moist mucous membranes.   Resp: Respiratory effort is normal, no retractions, no stridor.   CV: Heart is regular rate and rhythm.   Skin: No evidence of trauma, skin is warm and dry. No rashes, lesions or ulcers.  Skel: full range of motion of upper and right  lower extremities. No midline tenderness over the cervical thoracic or lumbar spine.  LLE: +tenderness and edema over the lateral malleolus.  No tenderness over the medial malleolus. No edema over the medial malleolus, foot, calf, or tib-fib.  No tenderness over the proximal fibula or 5th metatarsal.  Able to range ankle with mild pain.  Achilles intact.  Able to wiggle toes.  No tenderness over the calf, tib-fib, or knee.  There is no warmth or erythema, no evidence of cellulitis or septic joint.  Compartments are soft to palpation. Skin is intact. Is neurovascularly intact distally, 2+ DP pulses, cap refill <2 sec, warm and well perfused, sensation intact and equal throughout the BLE.    Neuro: Normal gait, no motor or sensory changes.  Psych: Alert and oriented ×3, judgment is appropriate, normal mood and affect         Kirill Coma Scale Score: 15                     Patient History   Past Medical History:   Diagnosis Date    Disease of stomach and duodenum, unspecified     Stomach problems    Encounter for gynecological examination (general) (routine) without abnormal findings 02/12/2016    Well woman exam    Headache, unspecified 12/30/2013    Headache    Other conditions influencing health status     Menstruation    Other conditions influencing health status     History of pregnancy    Presence of (intrauterine) contraceptive device     IUD contraception     No past surgical history on file.  No family history on file.  Social History     Tobacco Use    Smoking status: Some Days     Types: Cigarettes    Smokeless tobacco: Not on file   Vaping Use    Vaping status: Never Used   Substance Use Topics    Alcohol use: Not Currently    Drug use: Never       Physical Exam   ED Triage Vitals [06/02/24 1300]   Temperature Heart Rate Respirations BP   36.8 °C (98.2 °F) (!) 107 18 114/81      Pulse Ox Temp Source Heart Rate Source Patient Position   100 % Temporal Monitor Sitting      BP Location FiO2 (%)     Right arm  --       Physical Exam    ED Course & MDM   Diagnoses as of 06/02/24 1507   Closed fracture of distal end of fibula, unspecified fracture morphology, initial encounter       Medical Decision Making  ED course / MDM     Summary:  Patient presented with a left ankle injury, mechanical fall.  Mildly tachycardic in triage at 107, improved to 78 on repeat vitals, vital signs otherwise normal.  Patient is very well-appearing, ambulates unassisted though with pain, no acute distress.  No systemic symptoms.  On exam, there is tenderness and edema over the lateral malleolus, she is able to flex and extend the ankle though with pain, there is no tenderness or edema proximal to the ankle or into the foot.  Extremity is neurovascularly intact.  Patient was given a dose of Tylenol and Motrin in the ED which did improve her pain.  X-ray shows an acute Kaminski a fracture of the distal fibula with lateral soft tissue swelling. Results and differential were discussed in detail with the patient.  Discussed immobilization options, patient is in agreement the plan for splinting and nonweightbearing status with crutches.  Patient was placed in a posterior short leg splint to the ED by paramedic staff. Splint applied by nursing. Is in proper place and the extremity is in proper alignment. After splint application, patient reports no increase in pain, and the extremity remains neurovascularly intact distally with 2+ pulses, cap refill <2 sec, and intact sensation.   I expressed the importance of outpatient follow-up with orthopedics within the next 2 to 3 days for further evaluation and management of her injury.  Also advised to follow-up with her PCP.  She was provided with a work note as requested.  Patient was given strict return precautions, understands reasons to return to the ED. Also discussed supportive care instructions. I expressed the importance of outpatient follow up with their PCP. All questions were answered, patient  expressed understanding and stated that they would comply.    Impression:  1. See diagnosis    Plan: Homegoing. I discussed the differential, results, and discharge plan with the patient and family/friend/caregiver. Patient was advised to follow up with PCP or recommended provider in 2-3 days for another evaluation and exam. I emphasized the importance of follow-up with the physician I referred them to in the timeframe recommended.  I explained reasons for the patient to return to the Emergency Department. They agreed that if they feel their condition is worsening,  if symptoms change, get worse, new symptoms develop prior to follow up, or if they have any other concern they should call 911 immediately for further assistance. If there is no improvement in symptoms in the next 24 hours they are advised to return for further evaluation and exam. I also explained the plan and treatment course. We also discussed medications that were prescribed including common side effects and interactions. The patient was advised to abstain from driving, operating heavy machinery, or making significant decisions while taking medications such as opiates and muscle relaxers that may impair this. I gave the patient an opportunity to ask all questions they had and answered all of them accordingly. They understand return precautions and discharge instructions. Patient and family/friend/caregiver/guardian is in agreement with plan, treatment course, and follow up and states verbally that they will comply.     Disposition: Discharge    This note has been transcribed using voice recognition and may contain grammatical errors, misplaced words, incorrect words, incorrect phrases or other errors.   Procedure  Procedures     Candice Arora PA-C  06/02/24 1745

## 2024-06-05 ENCOUNTER — OFFICE VISIT (OUTPATIENT)
Dept: ORTHOPEDIC SURGERY | Facility: CLINIC | Age: 34
End: 2024-06-05
Payer: COMMERCIAL

## 2024-06-05 VITALS — HEIGHT: 62 IN | WEIGHT: 125 LBS | BODY MASS INDEX: 23 KG/M2

## 2024-06-05 DIAGNOSIS — S82.62XA CLOSED LOW LATERAL MALLEOLUS FRACTURE, LEFT, INITIAL ENCOUNTER: Primary | ICD-10-CM

## 2024-06-05 PROCEDURE — 99214 OFFICE O/P EST MOD 30 MIN: CPT | Performed by: ORTHOPAEDIC SURGERY

## 2024-06-05 PROCEDURE — L4361 PNEUMA/VAC WALK BOOT PRE OTS: HCPCS | Performed by: ORTHOPAEDIC SURGERY

## 2024-06-05 RX ORDER — ACETAMINOPHEN 500 MG
1000 TABLET ORAL EVERY 8 HOURS PRN
Qty: 30 TABLET | Refills: 2 | Status: SHIPPED | OUTPATIENT
Start: 2024-06-05 | End: 2024-07-05

## 2024-06-05 ASSESSMENT — PAIN SCALES - GENERAL: PAINLEVEL_OUTOF10: 8

## 2024-06-05 ASSESSMENT — PAIN - FUNCTIONAL ASSESSMENT: PAIN_FUNCTIONAL_ASSESSMENT: 0-10

## 2024-06-05 NOTE — LETTER
June 5, 2024     Patient: William Abrams   YOB: 1990   Date of Visit: 6/5/2024       To Whom It May Concern:    It is my medical opinion that William Abrams should remain out of work until 2 weeks until further notice .    If you have any questions or concerns, please don't hesitate to call.         Sincerely,        Zurdo Guerra MD    CC: No Recipients

## 2024-06-06 NOTE — PROGRESS NOTES
Aleksander presents with a new injury today.  She is a 34-year-old female who was previously seen for a right tibial nail with likely complex regional pain syndrome post injury in her right lower extremity.  She presents today for a new injury when she fell just a few days ago on her left ankle.  She is been trying to get back to work but has not started yet.  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  After her fall she presented to the emergency room for evaluation and is here for definitive management.  Emergency room x-rays demonstrated a minimally displaced Kaminski a fibula fracture.  She presented today in a splint.  She presents with her significant other.    The patient does not endorse fevers and chills. The patient does not endorse any change in her vision or hearing. They do not endorse chest pain, shortness of breath. The patient does not endorse any abdominal discomfort. They do not endorse any skin irritation or lesions. They do not endorse any new numbness and tingling or as otherwise stated in the history of present illness.    She is in no acute distress, alert and oriented x 3.    Mood and affect are appropriate.    Respirations are unlabored.    Distal limb is pink and well perfused.    Left lower extremity evaluation demonstrates no breaks in the skin but she does have evolving ecchymosis over her lateral ankle.  She has pain to palpation exquisitely in this area.  Range of motion also is difficult to assess due to pain.  She has expected swelling this area.    Multiple views from time of injury of her left ankle demonstrate a minimally displaced Kaminski a type fibula fracture best seen on the lateral view.    34-year-old female with a left Kaminski a type low lateral malleolus fracture.  I would treat this conservatively without manipulation.  She can go into a boot today.  Patient was prescribed a cam walker boot for fibula fracture.The patient is ambulatory with or  without aid; but, has weakness, instability and/or deformity of their [right / left] lower extremity which requires stabilization from this orthosis to improve their function.      Verbal and written instructions for the use, wear schedule, cleaning and application of this item were given.  Patient was instructed that should the orthotic device result in increased pain, decreased sensation, increased swelling, or an overall worsening of their medical condition, to please contact our office immediately.     Orthotic management and training was provided for skin care, modifications due to healing tissues, edema changes, interruption in skin integrity, and safety precautions with the orthosis.    I would like to see her back in 2 weeks to reassess her ability return to work.  This is a stable injury and is minimally displaced but it still going to be quite painful for standing and on for 6 hours at work acutely.  I be happy to support her going back is and she is comfortable with something that would be feasible for 2 weeks.  Weightbearing as tolerated in the boot.  Physical therapy was prescribed.    Natural History reviewed. All questions answered. The patient was in agreement with the plan.      **This note was created using voice recognition software and was not corrected for typographical or grammatical errors.**

## 2024-06-14 ENCOUNTER — OFFICE VISIT (OUTPATIENT)
Dept: PRIMARY CARE | Facility: CLINIC | Age: 34
End: 2024-06-14
Payer: COMMERCIAL

## 2024-06-14 VITALS
OXYGEN SATURATION: 98 % | HEART RATE: 110 BPM | SYSTOLIC BLOOD PRESSURE: 126 MMHG | TEMPERATURE: 97.7 F | HEIGHT: 62 IN | WEIGHT: 124 LBS | DIASTOLIC BLOOD PRESSURE: 80 MMHG | BODY MASS INDEX: 22.82 KG/M2

## 2024-06-14 DIAGNOSIS — F32.9 REACTIVE DEPRESSION: ICD-10-CM

## 2024-06-14 DIAGNOSIS — S82.832D CLOSED FRACTURE OF DISTAL END OF LEFT FIBULA WITH ROUTINE HEALING, UNSPECIFIED FRACTURE MORPHOLOGY, SUBSEQUENT ENCOUNTER: ICD-10-CM

## 2024-06-14 DIAGNOSIS — Z23 ENCOUNTER FOR IMMUNIZATION: ICD-10-CM

## 2024-06-14 DIAGNOSIS — E55.9 VITAMIN D DEFICIENCY: ICD-10-CM

## 2024-06-14 DIAGNOSIS — R19.5 LOOSE STOOLS: ICD-10-CM

## 2024-06-14 DIAGNOSIS — R10.84 GENERALIZED ABDOMINAL PAIN: ICD-10-CM

## 2024-06-14 DIAGNOSIS — K92.1 BLACK STOOLS: ICD-10-CM

## 2024-06-14 DIAGNOSIS — Z71.85 VACCINE COUNSELING: ICD-10-CM

## 2024-06-14 DIAGNOSIS — Z13.6 SCREENING FOR CARDIOVASCULAR CONDITION: ICD-10-CM

## 2024-06-14 DIAGNOSIS — Z00.00 ANNUAL PHYSICAL EXAM: Primary | ICD-10-CM

## 2024-06-14 PROCEDURE — 99395 PREV VISIT EST AGE 18-39: CPT | Performed by: STUDENT IN AN ORGANIZED HEALTH CARE EDUCATION/TRAINING PROGRAM

## 2024-06-14 PROCEDURE — 90677 PCV20 VACCINE IM: CPT | Performed by: STUDENT IN AN ORGANIZED HEALTH CARE EDUCATION/TRAINING PROGRAM

## 2024-06-14 PROCEDURE — 99214 OFFICE O/P EST MOD 30 MIN: CPT | Performed by: STUDENT IN AN ORGANIZED HEALTH CARE EDUCATION/TRAINING PROGRAM

## 2024-06-14 PROCEDURE — 87449 NOS EACH ORGANISM AG IA: CPT | Performed by: STUDENT IN AN ORGANIZED HEALTH CARE EDUCATION/TRAINING PROGRAM

## 2024-06-14 ASSESSMENT — PATIENT HEALTH QUESTIONNAIRE - PHQ9
1. LITTLE INTEREST OR PLEASURE IN DOING THINGS: SEVERAL DAYS
3. TROUBLE FALLING OR STAYING ASLEEP OR SLEEPING TOO MUCH: MORE THAN HALF THE DAYS
5. POOR APPETITE OR OVEREATING: NOT AT ALL
8. MOVING OR SPEAKING SO SLOWLY THAT OTHER PEOPLE COULD HAVE NOTICED. OR THE OPPOSITE, BEING SO FIGETY OR RESTLESS THAT YOU HAVE BEEN MOVING AROUND A LOT MORE THAN USUAL: NOT AT ALL
9. THOUGHTS THAT YOU WOULD BE BETTER OFF DEAD, OR OF HURTING YOURSELF: NOT AT ALL
SUM OF ALL RESPONSES TO PHQ QUESTIONS 1-9: 6
4. FEELING TIRED OR HAVING LITTLE ENERGY: MORE THAN HALF THE DAYS
SUM OF ALL RESPONSES TO PHQ9 QUESTIONS 1 AND 2: 2
10. IF YOU CHECKED OFF ANY PROBLEMS, HOW DIFFICULT HAVE THESE PROBLEMS MADE IT FOR YOU TO DO YOUR WORK, TAKE CARE OF THINGS AT HOME, OR GET ALONG WITH OTHER PEOPLE: NOT DIFFICULT AT ALL
7. TROUBLE CONCENTRATING ON THINGS, SUCH AS READING THE NEWSPAPER OR WATCHING TELEVISION: NOT AT ALL
6. FEELING BAD ABOUT YOURSELF - OR THAT YOU ARE A FAILURE OR HAVE LET YOURSELF OR YOUR FAMILY DOWN: NOT AT ALL
2. FEELING DOWN, DEPRESSED OR HOPELESS: SEVERAL DAYS

## 2024-06-14 ASSESSMENT — ENCOUNTER SYMPTOMS
DEPRESSION: 1
LOSS OF SENSATION IN FEET: 1
OCCASIONAL FEELINGS OF UNSTEADINESS: 1

## 2024-06-14 ASSESSMENT — PAIN SCALES - GENERAL: PAINLEVEL: 6

## 2024-06-14 NOTE — PATIENT INSTRUCTIONS
Thank you for coming to see me today.    Pneumonia vaccination in clinic today.    Stool kit sent home with you today.  Bring specimen to the lab once collected.    Go to the lab for fasting blood work, we will call you with all results.    GI referral entered today for Dr. Harden - call to schedule.  Printed information on Low FODMAP diet provided.     Psychology referral entered.  This referral can be taken to any provider of your choice, but I would recommend [facility] and will be as we discussed.  Address: [address], [place] [address]  Phone: [phone]    Follow up with me for Pap smear at your convenience.

## 2024-06-14 NOTE — PROGRESS NOTES
"Karl Abrams \"Aleksander\" is a 34 y.o. female who presents for fracture of left foot fibula     HPI:      This is a 34-year-old female presenting for ED follow-up - also due for routine physical/preventative care visit.      Seen by me one time previously on 10/31/2023 for right finger laceration and abdominal pain.  Recent ED visit on 6/2/2024 at [facility] emergency department.    ED Summary:    Patient presented with complaint of left ankle injury.  Tripped in her home and twisted her left ankle, felt a crack on the lateral aspect in the process.  On presentation, she was able to ambulate, but did have pain with weightbearing.  Exam showed tenderness and edema over the left lateral malleolus, was able to flex and extend the ankle although with pain.  Left lower extremity was neurovascularly intact on exam.  X-ray during ED course showing acute Kaminski a fracture of the distal fibula with lateral soft tissue swelling.  She was placed in a posterior short leg splint, instructed to use crutches for nonweightbearing and advised to follow-up with orthopedics in the next 2 to 3 days for further evaluation and management as well as to arrange PCP follow-up.    Follow-up visit with orthopedic surgeon Zurdo Guerra MD on 6/5/2024.  Imaging was reviewed at that time and plan was to treat conservatively without manipulation, sent home in a boot and prescribed a cam walker for fibula fracture.  She was advised to follow-up in 2 weeks to reassess ability to go to work at that time.  Injury assessed as stable, but concern for significant pain if standing for work 6 hours at a time.  Physical therapy also prescribed.    She has noted to have physical therapy appointment scheduled starting 6/24/2024 and into July.  She also has orthopedic surgery follow-up as scheduled on 6/19/2024 and 11/1/2024.    Pain management appt in July.     Working as a /[employer], on her feet most of the day. Will discuss " "return to work planning at her upcoming ortho appointment.    PREVENTATIVE HEALTH CARE:    Immunizations:  COVID:  DUE  Flu:  utd  TDaP:  utd  Pneumonia:  DUE (current smoker)    Immunization History   Administered Date(s) Administered    Flu vaccine (IIV4), preservative free *Check age/dose* 10/31/2023    HPV, Quadrivalent 05/13/2008    Hepatitis A vaccine, pediatric/adolescent (HAVRIX, VAQTA) 05/13/2008    Influenza, Unspecified 10/28/2021    Influenza, injectable, quadrivalent 10/28/2021    Meningococcal ACWY-D (Menactra) 4-valent conjugate vaccine 04/05/2007    Moderna SARS-CoV-2 Vaccination 01/23/2022, 02/20/2022    Pneumococcal Conjugate PCV 7 1992    Pneumococcal conjugate vaccine, 20-valent (PREVNAR 20) 06/14/2024    Tdap vaccine, age 7 year and older (BOOSTRIX, ADACEL) 07/16/2015, 05/30/2023    Varicella vaccine, subcutaneous (VARIVAX) 02/06/2007, 04/05/2007, 05/13/2008       Screenings:  HIV/HCV:  negative x2 10/17/2023  Pap:  3/18/2021 wnl, no HPV co-testing - DUE  Mammogram: Not currently indicated  Colonoscopy: Not currently indicated    Mental Health Referral  Requesting therapy/counseling referral.  Feels like depression worsened due to injury/pain.    Patient Health Questionnaire-9 Score: 6    Ongoing Abdominal Pain/Black Stool:  Had televisit x1 with GI provider last year, did not find suggestions helpful.  Did not hav ein person visit or follow up. Seeking second opinion.    ROS:    Review of systems is essentially negative for all systems except for any identified issues in HPI above.    Objective     /80   Pulse 110   Temp 36.5 °C (97.7 °F)   Ht 1.575 m (5' 2\")   Wt 56.2 kg (124 lb)   SpO2 98%   BMI 22.68 kg/m²      PHYSICAL EXAM    GENERAL  Well-appearing, pleasant and cooperative.  No acute distress.    HEENT  HEAD:   Normocephalic.  Atraumatic.  EYES:  PERRLA.  No scleral icterus or conjunctival injection.  EARS:  Tympanic membranes visualized bilaterally without erythema, " fluid, or bulging.  NECK:  No adenopathy.  No palpable thyroid enlargement or nodules.    THROAT:  Moist oropharynx without tonsillar enlargement or exudates.    LUNGS:    Clear to auscultation bilaterally.  No wheezes, rales, rhonchi.    CARDIAC:  Regular rate and rhythm.  Normal S1S2.  No murmurs/rubs/gallops.    ABDOMEN:  Soft, non-tender, non-distended.  No hepatosplenomegaly.  Normoactive bowel sounds.    MUSCULOSKELETAL:  No gross abnormalities.   No joint swelling or erythema,.  No spinal or paraspinal tenderness to palpation.    EXTREMITIES:  Left foot in boot.No LE edema or cyanosis.      NEURO           Alert and oriented x3. No focal deficits.    PSYCH:          Affect appropriate.           Assessment/Plan   Problem List Items Addressed This Visit    None  Visit Diagnoses       Vaccine counseling    -  Primary    Encounter for immunization        Black stools        Relevant Orders    Referral to Gastroenterology    Occult Blood, Stool    Vitamin D deficiency        Relevant Orders    Vitamin D 25-Hydroxy,Total (for eval of Vitamin D levels)    Annual physical exam        Relevant Orders    TSH with reflex to Free T4 if abnormal    Lipid Panel    CBC    Comprehensive Metabolic Panel    Screening for cardiovascular condition        Relevant Orders    Lipid Panel    Reactive depression        Relevant Orders    Referral to Psychology    Generalized abdominal pain        Relevant Orders    Referral to Gastroenterology    H. pylori antigen, stool    Loose stools        Relevant Orders    Referral to Gastroenterology    Closed fracture of distal end of left fibula with routine healing, unspecified fracture morphology, subsequent encounter        ED records, xray results reviewd.  Continue management and follow up with ortho and PT as scheduled.                 Corinne Hooker MD      ROS:   Review of systems is essentially negative for all systems except for any identified issues in HPI above.    Objective  "    /80   Pulse 110   Temp 36.5 °C (97.7 °F)   Ht 1.575 m (5' 2\")   Wt 56.2 kg (124 lb)   SpO2 98%   BMI 22.68 kg/m²      PHYSICAL EXAM    GENERAL  Well-appearing, pleasant and cooperative.  No acute distress.    HEENT  HEAD:   Normocephalic.  Atraumatic.  EYES:  PERRLA.  No scleral icterus or conjunctival injection.  EARS:  Tympanic membranes visualized bilaterally without erythema, fluid, or bulging.  NECK:  No adenopathy.  No palpable thyroid enlargement or nodules.    THROAT:  Moist oropharynx without tonsillar enlargement or exudates.    LUNGS:    Clear to auscultation bilaterally.  No wheezes, rales, rhonchi.    CARDIAC:  Regular rate and rhythm.  Normal S1S2.  No murmurs/rubs/gallops.    ABDOMEN:  Soft, non-tender, non-distended.  No hepatosplenomegaly.  Normoactive bowel sounds.    MUSCULOSKELETAL:  No gross abnormalities.   No joint swelling or erythema,.  No spinal or paraspinal tenderness to palpation.    EXTREMITIES:  No LE edema or cyanosis.      NEURO           Alert and oriented x3. No focal deficits.    PSYCH:          Affect appropriate.           Assessment/Plan   Problem List Items Addressed This Visit    None  Visit Diagnoses       Vaccine counseling    -  Primary    Encounter for immunization        Black stools        Relevant Orders    Referral to Gastroenterology    Occult Blood, Stool    Vitamin D deficiency        Relevant Orders    Vitamin D 25-Hydroxy,Total (for eval of Vitamin D levels)    Annual physical exam        Relevant Orders    TSH with reflex to Free T4 if abnormal    Lipid Panel    CBC    Comprehensive Metabolic Panel    Screening for cardiovascular condition        Relevant Orders    Lipid Panel    Reactive depression        Relevant Orders    Referral to Psychology    Generalized abdominal pain        Relevant Orders    Referral to Gastroenterology    H. pylori antigen, stool    Loose stools        Relevant Orders    Referral to Gastroenterology    Closed fracture " of distal end of left fibula with routine healing, unspecified fracture morphology, subsequent encounter                     Corinne Hooker MD

## 2024-06-19 ENCOUNTER — APPOINTMENT (OUTPATIENT)
Dept: ORTHOPEDIC SURGERY | Facility: CLINIC | Age: 34
End: 2024-06-19
Payer: COMMERCIAL

## 2024-06-24 ENCOUNTER — EVALUATION (OUTPATIENT)
Dept: PHYSICAL THERAPY | Facility: CLINIC | Age: 34
End: 2024-06-24
Payer: COMMERCIAL

## 2024-06-24 DIAGNOSIS — S82.62XA CLOSED LOW LATERAL MALLEOLUS FRACTURE, LEFT, INITIAL ENCOUNTER: ICD-10-CM

## 2024-06-24 DIAGNOSIS — M25.572 LEFT ANKLE PAIN: Primary | ICD-10-CM

## 2024-06-24 PROCEDURE — 97161 PT EVAL LOW COMPLEX 20 MIN: CPT | Mod: GP

## 2024-06-24 PROCEDURE — 97110 THERAPEUTIC EXERCISES: CPT | Mod: GP

## 2024-06-24 ASSESSMENT — PAIN SCALES - GENERAL: PAINLEVEL_OUTOF10: 5 - MODERATE PAIN

## 2024-06-24 ASSESSMENT — ENCOUNTER SYMPTOMS
OCCASIONAL FEELINGS OF UNSTEADINESS: 0
LOSS OF SENSATION IN FEET: 0
DEPRESSION: 0

## 2024-06-24 NOTE — PROGRESS NOTES
"Physical Therapy  Physical Therapy Orthopedic Evaluation    Patient Name: William Abrams \"Aleksander\"  MRN: [id number]  Today's Date: 6/24/2024  Time Calculation  Start Time: 0800  Stop Time: 0845  Time Calculation (min): 45 min    Insurance:  Visit number: 1  Approved number of visits:  8  [facility]  Auth date 6/24/24  to 9/30/24    General:  Reason for visit: L ankle fx  Referred by: sherif     Current Problem  1. Left ankle pain  Follow Up In Physical Therapy      2. Closed low lateral malleolus fracture, left, initial encounter  Referral to Physical Therapy    Follow Up In Physical Therapy            General  Reason for Referral: L ankle fx  Referred By: sherif  Precautions  Precautions  STEADI Fall Risk Score (The score of 4 or more indicates an increased risk of falling): 3  Precautions Comment: none  STEADI Fall Risk Score (The score of 4 or more indicates increased risk of falling)  Pain  0-10 (Numeric) Pain Score: 5 - Moderate pain      Subjective:     Chief Complaint: L ankle    Tripped over her husbands work boot, twisted her ankle and heard a few pops.  Went to [facility] got xrays and found fracture.  Was placed in boot for about 6 weeks      Onset:  6/5/24  JACEK:  tripped    Current Condition:   better     PAIN  increases pain/difficulty:  stairs, ankle movements   decrease pain:  icing, wrapping ankle     Intensity (0-10): current 5, on a bad day 7, on a good day 5  Location: lateral ankle L  Description: sharp achy    Relevant Information (PMH & Previous Tests/Imaging): xrrays  Previous Interventions/Treatments: none    Current level of function/exercise: none  Prior Level of Function (PLOF)  Patient previously independent with all ADLs  Exercise/Physical Activity: walking   Work/School: none      Work status: none    Living situation   - house with stairs   - lives with  and daughter   - reviewed and no concern  Personal factors Impacting care:   - language: ENG    Pt stated goal(s) walk " better    Medical History Form: Reviewed (scanned into chart)    Red Flags: Do you have any of the following? none  Fever/chills, unexplained weight changes, dizziness/fainting, unexplained change in bowel or bladder functions, unexplained malaise or muscle weakness, night pain/sweats, numbness or tingling    Problem list:  Pain, Impaired balance, Impaired gait, Impaired stair negotiation , Limitations to normal ADL's, and Decreased knowledge of HEP     Objective:    Ankle ROM  DF 6  PF  42  IV  23  EV 13    Ankle MMT L only  DF 5  PF 5  IV 4!!  EV 3!!    Moderate swelling L ankle    Amb in walking boot    Outcome Measures:  Other Measures  Lower Extremity Funtional Score (LEFS): 55     EDUCATION: home exercise program, plan of care, activity modifications, pain management, and injury pathology      HEP: Performed and provided:    Access Code: [email or web address]  URL: [email or web address]  Date: 06/24/2024  Prepared by: Audrey Pleitez    Exercises  - Long Sitting Isometric Ankle Plantarflexion with Ball at Wall  - 1 x daily - 7 x weekly - 3 sets - 10 reps - 2-3 seconds hold  - Isometric Ankle Dorsiflexion and Plantarflexion  - 1 x daily - 7 x weekly - 3 sets - 10 reps - 2-3 seconds hold  - Isometric Ankle Inversion  - 1 x daily - 7 x weekly - 3 sets - 10 reps - 2-3 sec hold  - Long Sitting Isometric Ankle Eversion in Dorsiflexion with Ball at Wall  - 1 x daily - 7 x weekly - 3 sets - 10 reps - 2-3 sec hold  - Long Sitting Calf Stretch with Strap  - 1 x daily - 7 x weekly - 2-3 reps - 30-60 seconds hold  - Supine Ankle Circles  - 1 x daily - 7 x weekly - 1-3 sets - 20 reps  - Seated Lesser Toes Extension  - 1 x daily - 7 x weekly - 1-2 sets - 20 reps  - Seated Great Toe Extension  - 1 x daily - 7 x weekly - 1-2 sets - 20 reps    Charges: EVAL x 1 TE x 1  Level of Eval Complexity:  low  Clinical Presentation: Stable and/or uncomplicated characteristics    Assessment: Patient is a 33 yo f with c/o L ankle  pain s/p fibular fx.   Pt presents with signs and symptoms consistent with diagnosis , resulting in limited participation in pain-free ADLs and inability to perform at their prior level of function. Pt would benefit from physical therapy to address the impairments found & listed previously in the objective section in order to return to safe and pain-free ADLs and prior level of function.    Prognosis: Good    Plan:     Planned Interventions include: therapeutic exercise, self-care home management, manual therapy, therapeutic activities, gait training, neuromuscular coordination, aquatic therapy  Frequency: 1  Duration: 6-8 weeks    Goals:  Active       PT Problem       STG       Start:  06/24/24    Expected End:  07/09/24       Independent HEP by 2 weeks         LTG       Start:  06/24/24    Expected End:  08/23/24       Normal gait without boot by 4-6 weeks   Reciprocal stairs with ease by 4-6 weeks  MMT 5/5 B ankle by 6-8 weeks  ROM L = R ankle by 4-6 weeks  SLS > 1 min L without LOB by 6-8 weeks  LEFS improved by 20 points by 6-8 weeks             Plan of Care was developed with input and agreement by the patient.

## 2024-07-05 ENCOUNTER — TREATMENT (OUTPATIENT)
Dept: PHYSICAL THERAPY | Facility: CLINIC | Age: 34
End: 2024-07-05
Payer: COMMERCIAL

## 2024-07-05 DIAGNOSIS — M25.572 LEFT ANKLE PAIN: ICD-10-CM

## 2024-07-05 DIAGNOSIS — S82.62XA CLOSED LOW LATERAL MALLEOLUS FRACTURE, LEFT, INITIAL ENCOUNTER: ICD-10-CM

## 2024-07-05 PROCEDURE — 97110 THERAPEUTIC EXERCISES: CPT | Mod: GP

## 2024-07-05 PROCEDURE — 97016 VASOPNEUMATIC DEVICE THERAPY: CPT | Mod: GP

## 2024-07-05 ASSESSMENT — PAIN SCALES - GENERAL: PAINLEVEL_OUTOF10: 5 - MODERATE PAIN

## 2024-07-05 NOTE — PROGRESS NOTES
Physical Therapy Treatment    Patient Name: William Abrams  MRN: [id number]  Today's Date: 7/5/2024  Time Calculation  Start Time: 0915  Stop Time: 1015  Time Calculation (min): 60 min    Insurance:   Visit #: 2  Approved number of visits:  8  Insurance: grayson  Auth date 6/24/24  to 9/30/24      Current Problem  1. Closed low lateral malleolus fracture, left, initial encounter  Follow Up In Physical Therapy      2. Left ankle pain  Follow Up In Physical Therapy          General  Reason for Referral: L ankle fx  Referred By: sherif  Precautions  Precautions  Precautions Comment: none  Pain  0-10 (Numeric) Pain Score: 5 - Moderate pain    Subjective:     Patient reports pain about 5/10.  Pain waking her and keeping her from sleeping.     Compliant with HEP? yes    Objective:     Amb with boot    Treatments:   BAPS level 3 20 x cw & ccw L  Weight board PF/DF 15# EV/IV 10#   Valslides groin 2 way 15 x B  Bosu lunges 20 x B  Ankle PF and DF black band 20 x B  Game ready 15 min L ankle    Charges: TE x 3 game ready 15 min     Assessment: able to complete all exercises with mild pain.        Plan: general strengthening and stability L LE

## 2024-07-09 ENCOUNTER — TREATMENT (OUTPATIENT)
Dept: PHYSICAL THERAPY | Facility: CLINIC | Age: 34
End: 2024-07-09
Payer: COMMERCIAL

## 2024-07-09 DIAGNOSIS — S82.62XA CLOSED LOW LATERAL MALLEOLUS FRACTURE, LEFT, INITIAL ENCOUNTER: ICD-10-CM

## 2024-07-09 DIAGNOSIS — M25.572 LEFT ANKLE PAIN: ICD-10-CM

## 2024-07-09 PROCEDURE — 97110 THERAPEUTIC EXERCISES: CPT | Mod: GP

## 2024-07-09 PROCEDURE — 97112 NEUROMUSCULAR REEDUCATION: CPT | Mod: GP

## 2024-07-09 ASSESSMENT — PAIN SCALES - GENERAL: PAINLEVEL_OUTOF10: 5 - MODERATE PAIN

## 2024-07-09 NOTE — PROGRESS NOTES
Physical Therapy Treatment    Patient Name: William Abrams  MRN: [id number]  Today's Date: 7/9/2024  Time Calculation  Start Time: 0800  Stop Time: 0845  Time Calculation (min): 45 min    Insurance:   Visit #: 3  Approved number of visits: 8  [facility]  Auth date 6/24/24  to 9/30/24      Current Problem  1. Closed low lateral malleolus fracture, left, initial encounter  Follow Up In Physical Therapy      2. Left ankle pain  Follow Up In Physical Therapy          General  Reason for Referral: L ankle fx  Referred By: sherif  Precautions  Precautions  Precautions Comment: none  Pain  0-10 (Numeric) Pain Score: 5 - Moderate pain    Subjective:     Patient reports ankle doing a little better.  Pain about 5/10.     Compliant with HEP? yes    Objective:   Amb in boot      Treatments:     Weight board PF/DF 15# EV/IV 10#   Incline stretch  1min  Theraband rocker DF/PF x 20, IV/EV x 20 L  DBE 2' x 2  Bosu lunges 20 x b  1/2 foam rebounder toss 2 way wide stance 25 x     Charges: NM x 1 TE x 2    Assessment: reported increased discomfort with standing exercises but able to perform all exercises.       Plan: balance general strengthening stability

## 2024-07-16 ENCOUNTER — TREATMENT (OUTPATIENT)
Dept: PHYSICAL THERAPY | Facility: CLINIC | Age: 34
End: 2024-07-16
Payer: COMMERCIAL

## 2024-07-16 DIAGNOSIS — M25.572 LEFT ANKLE PAIN: ICD-10-CM

## 2024-07-16 DIAGNOSIS — S82.62XA CLOSED LOW LATERAL MALLEOLUS FRACTURE, LEFT, INITIAL ENCOUNTER: ICD-10-CM

## 2024-07-16 PROCEDURE — 97110 THERAPEUTIC EXERCISES: CPT | Mod: GP

## 2024-07-16 ASSESSMENT — PAIN SCALES - GENERAL: PAINLEVEL_OUTOF10: 5 - MODERATE PAIN

## 2024-07-16 NOTE — PROGRESS NOTES
Physical Therapy Treatment    Patient Name: William Abrams  MRN: [id number]  Today's Date: 7/16/2024  Time Calculation  Start Time: 0803  Stop Time: 0845  Time Calculation (min): 42 min    Insurance:   Visit #: 4  Approved number of visits: 8  [facility]  Auth date 6/24/24  to 9/30/24      Current Problem  1. Closed low lateral malleolus fracture, left, initial encounter  Follow Up In Physical Therapy      2. Left ankle pain  Follow Up In Physical Therapy          General  Reason for Referral: L ankle fx  Referred By: sherif  Precautions  Precautions  Precautions Comment: none  Pain  0-10 (Numeric) Pain Score: 5 - Moderate pain    Subjective:     Patient reports ankle sore pain about 5/10.      Compliant with HEP? yes    Objective:         Treatments:   Bike 5 min  Calf raise 20 x 3 way  Tandem balance kb pass cw & ccw 20 x   Bosu lunges 20 x B  Bosu squats 20 x   Leg press 60# 12 x 3    Charges: TE x 3    Assessment: increased discomfort with more weight bearing activities but completed without hesitation.       Plan: balance board, balance, band walk

## 2024-07-19 ENCOUNTER — OFFICE VISIT (OUTPATIENT)
Dept: PAIN MEDICINE | Facility: HOSPITAL | Age: 34
End: 2024-07-19
Payer: COMMERCIAL

## 2024-07-19 DIAGNOSIS — S82.401S FRACTURE OF TIBIA WITH FIBULA, RIGHT, OPEN, SEQUELA: ICD-10-CM

## 2024-07-19 DIAGNOSIS — S82.201S FRACTURE OF TIBIA WITH FIBULA, RIGHT, OPEN, SEQUELA: ICD-10-CM

## 2024-07-19 DIAGNOSIS — G90.50 COMPLEX REGIONAL PAIN SYNDROME TYPE 1, AFFECTING UNSPECIFIED SITE: ICD-10-CM

## 2024-07-19 PROCEDURE — 99214 OFFICE O/P EST MOD 30 MIN: CPT | Performed by: ANESTHESIOLOGY

## 2024-07-19 RX ORDER — TOPIRAMATE 25 MG/1
25 TABLET ORAL DAILY
Qty: 196 TABLET | Refills: 0 | Status: SHIPPED | OUTPATIENT
Start: 2024-07-19 | End: 2025-01-31

## 2024-07-19 RX ORDER — AMITRIPTYLINE HYDROCHLORIDE 10 MG/1
10 TABLET, FILM COATED ORAL NIGHTLY
Qty: 30 TABLET | Refills: 3 | Status: SHIPPED | OUTPATIENT
Start: 2024-07-19 | End: 2024-11-16

## 2024-07-19 ASSESSMENT — PAIN SCALES - GENERAL: PAINLEVEL: 7

## 2024-07-19 NOTE — PROGRESS NOTES
"Subjective   Patient ID: William Abrams \"Aleksander\" is a 34 y.o. female with a past medical history of right tibial fracture s/p tibial nail with persistent RLE pain.       HPI:   William Abrams is a 34 y.o. F pleasant with a past medical history of right tibial fracture after a fall going upstairs status post right tibial nail with Dr. Guerra in November 2023. Since then she has had persistent lower right extremity pain. She has completed many rounds of physical therapy most recently from March to May 2024 she reports that the pain is located from her right knee all the way down to her right toes. The pain is described as a sharp burning pain that is excruciating at times. She also reports that the leg feels warmer than her left leg. When showering hot water running down causes extremely painful. She’s also noted some skin discoloration of her right lower extremity compared to the left and also has noted swelling of her right foot compared to the left. In the past, she has tried Tylenol, ibuprofen, topical creams, and physical therapy. She’s currently on gabapentin 800 mg three times a day, however has not noted any different in pain with any of these interventions. She has seen Dr. Campbell who referred her to us for consideration of complex regional pain syndrome.  Patient reports that she is well    Patient reports that her pain was not present before her initial injury and that she has never had pain in her legs previous to the injury. She also denies any history of back pain.  Unfortunately, the patient had a fall while at work about five weeks ago and fractured her left like and is in a cam boot for her left leg currently. 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 She parts at her right leg is significantly worse than left.  She denies any other significant medical history, denies any other previous " surgeries, and denies taking any anti-coagulation.    Physical Therapy: The patient has done six or more weeks of physical therapy in the past six months with minimal improvement  Other Conservative Measures she has tried: Heating Pad and Ice  Classes of medications tried in the past: Acetaminophen, NSAIDs, and Gabapentenoids    Review of Systems   13-point ROS done and negative except for HPI.     Current Outpatient Medications   Medication Instructions    amitriptyline (ELAVIL) 10 mg, oral, Nightly    docusate sodium (COLACE) 100 mg, oral, 2 times daily    gabapentin (NEURONTIN) 800 mg, oral, 3 times daily    Liletta 52 mg, intrauterine, Daily    loratadine (Claritin) 10 mg tablet 1 tablet, oral, Daily    NON FORMULARY 1 each, oral, Daily, Tumeric suppliment    pantoprazole (ProtoNix) 40 mg EC tablet 1 tablet, oral, Daily RT    topiramate (TOPAMAX) 25 mg, oral, Daily, Take 1 tab at bedtime x7 days --> 1 BID x7 days--> 1 qAM 2 qHS x7 days--> 2 BID x7 days-->2 qAM, 3 qHS x7 days--> 3 BID x7 days --> 3 qAM, 4 at bedtime x7 days -->4 BID & call the office for the 100 mg strength       Past Medical History:   Diagnosis Date    Disease of stomach and duodenum, unspecified     Stomach problems    Encounter for gynecological examination (general) (routine) without abnormal findings 02/12/2016    Well woman exam    Headache, unspecified 12/30/2013    Headache    Other conditions influencing health status     Menstruation    Other conditions influencing health status     History of pregnancy    Presence of (intrauterine) contraceptive device     IUD contraception        No past surgical history on file.     No family history on file.     Allergies   Allergen Reactions    Latex Unknown        Objective     There were no vitals filed for this visit.     Physical Exam  General: NAD, well groomed, well nourished  Eyes: Non-icteric sclera, EOMI  Ears, Nose, Mouth, and Throat: External ears and nose appear to be without deformity  or rash. No lesions or masses noted. Hearing is grossly intact.   Neck: Trachea midline  Respiratory: Nonlabored breathing   Cardiovascular: no peripheral edema   Skin: No rashes or open lesions/ulcers identified on skin.    Back:   Palpation: No tenderness to palpation over lumbar paraspinous muscles.     Neurologic:   Cranial nerves grossly intact.   Strength 3/5 plantar and dorsiflexion; LLE strength exam deferred as patient with recent fracture.      CRPS Exam:  - hyperalgesia to light touch and deep muscle pressure of her RLE  - left calf measurement: 33cm, right calf measurement: 34cm  - diminished sensation to hot temperature over right LE compared to LLE    Psychiatric: Alert, orientation to person, place, and time. Cooperative.      Assessment/Plan   William Abrams is a 35 yo F with PMHx of right traumatic tibial fracture s/p tibial nail with persistent RLE pain. She was referred from her orthopedic surgeon for consideration of CRPS. She reports symptoms of increased pain, temperature changes (burning sensation), discoloration, and edema. On exam, she has significant deep tissue hyperalgesia of her RLE past her knee, edema and visible motor atrophy of her RLE compared to LLE. Based on the [place] criteria, the patient meets criteria for CRPS based on her symptoms and exam findings. We will start her on topiramate, amytriptyline and recommend a RIGHT lumbar sympathetic block. Discussed the importance of physical therapy exercises at home, and the the mainstay treatment is to continue exercises. Unfortunately she is in a CAM boot for a fracture of her LLE after a fall 5 weeks ago.    Plan:  - prescribe topiramate 50mg nightly and titrate to 100mg BID and amitriptyline 10mg nightly.  Given recent left fibular fracture, oral steroids have not been considered   - discussed side effects of medications, and that should she experience any side effects to stop the medications and let our office know  - we  recommended a TENS unit for the patient to help with her pain  - we will schedule a RIGHT lumbar sympathetic block for the patient  - patient to continue physical therapy home exercises and continue aqua therapy as tolerated  - patient may require dorsal column neuromodulation in the future should her CRPS pain be persistent and resistant to more conservative treatment measures    The patient has failed treatment with : Physical therapy , three or more classes of medications, have significant limitations in their sleep quality due to the pain, have significant limitations of their quality of life due to the pain, and have significant impairments of their activities of daily living (ADLs) due to the pain    We discussed  the risks, benefits and alternatives of the procedure including but not limited to: , Lack of efficacy , Transiently worsening pain , Bleeding, Infection , and Nerve Damage    Follow up: After procedure    The patient was invited to contact us back anytime with any questions or concerns and follow-up with us in the office as needed.     Diagnoses and all orders for this visit:  Fracture of tibia with fibula, right, open, sequela  -     Referral to Pain Management  -     Sympathetic Block; Future  Complex regional pain syndrome type 1, affecting unspecified site  -     [place] pain management; Future  -     Sympathetic Block; Future  -     PT eval and treat; Future  -     amitriptyline (Elavil) 10 mg tablet; Take 1 tablet (10 mg) by mouth once daily at bedtime.  -     topiramate (Topamax) 25 mg tablet; Take 1 tablet (25 mg) by mouth once daily. Take 1 tab at bedtime x7 days --> 1 BID x7 days--> 1 qAM 2 qHS x7 days--> 2 BID x7 days-->2 qAM, 3 qHS x7 days--> 3 BID x7 days --> 3 qAM, 4 at bedtime x7 days -->4 BID & call the office for the 100 mg strength      This note was generated with the aid of dictation software, there may be typos despite my attempts at proofreading.   The patient was discussed and seen  with Dr. Centeno.  Murray Blunt MD  PGY-5  Interventional Pain Fellow

## 2024-07-20 PROBLEM — G90.50 COMPLEX REGIONAL PAIN SYNDROME TYPE 1: Status: ACTIVE | Noted: 2024-07-20

## 2024-07-23 ENCOUNTER — TREATMENT (OUTPATIENT)
Dept: PHYSICAL THERAPY | Facility: CLINIC | Age: 34
End: 2024-07-23
Payer: COMMERCIAL

## 2024-07-23 DIAGNOSIS — M25.572 LEFT ANKLE PAIN: ICD-10-CM

## 2024-07-23 DIAGNOSIS — S82.62XA CLOSED LOW LATERAL MALLEOLUS FRACTURE, LEFT, INITIAL ENCOUNTER: ICD-10-CM

## 2024-07-23 PROCEDURE — 97016 VASOPNEUMATIC DEVICE THERAPY: CPT | Mod: GP

## 2024-07-23 PROCEDURE — 97110 THERAPEUTIC EXERCISES: CPT | Mod: GP

## 2024-07-23 PROCEDURE — 97112 NEUROMUSCULAR REEDUCATION: CPT | Mod: GP

## 2024-07-23 ASSESSMENT — PAIN SCALES - GENERAL: PAINLEVEL_OUTOF10: 5 - MODERATE PAIN

## 2024-07-23 NOTE — PROGRESS NOTES
Physical Therapy Treatment    Patient Name: William Abrams  MRN: [id number]  Today's Date: 7/23/2024  Time Calculation  Start Time: 0800  Stop Time: 0900  Time Calculation (min): 60 min    Insurance:   Visit #: 5  Approved number of visits: 8  Insurance: grayson  Auth date 6/24/24  to 9/30/24      Current Problem  1. Closed low lateral malleolus fracture, left, initial encounter  Follow Up In Physical Therapy      2. Left ankle pain  Follow Up In Physical Therapy          General  Reason for Referral: L ankle fx  Referred By: sherif  Precautions  Precautions  Precautions Comment: none  Pain  0-10 (Numeric) Pain Score: 5 - Moderate pain    Subjective:     Patient reports pain about 5/10 L ankle.  Saw pain management for R ankle and was diagnosed with CRPS. Scheduled for nerve block on aug 2.      Compliant with HEP? yes    Objective:     Normal gait in shoes.      Treatments:     Weight board 15# PF/DF, 10# EV/IV 2 min L   Incline stretch 1 min   Bosu lunges 20 x B  Bosu squats 20 x 2 way B  Leg press 80# 12 x 3    Charges: TE x 2 NM x 1 vaso     Assessment: doing well with balance and stability challenged with weight board.   Encouraged to make follow up with MD and schedule xray to determine bone healing.     Plan: continue strength and stability soft tissue possible joint mobs

## 2024-07-24 DIAGNOSIS — S82.201S TIBIA AND FIBULA OPEN FRACTURE, RIGHT, SEQUELA: ICD-10-CM

## 2024-07-24 DIAGNOSIS — S82.401S TIBIA AND FIBULA OPEN FRACTURE, RIGHT, SEQUELA: ICD-10-CM

## 2024-07-30 ENCOUNTER — TREATMENT (OUTPATIENT)
Dept: PHYSICAL THERAPY | Facility: CLINIC | Age: 34
End: 2024-07-30
Payer: COMMERCIAL

## 2024-07-30 DIAGNOSIS — S82.62XA CLOSED LOW LATERAL MALLEOLUS FRACTURE, LEFT, INITIAL ENCOUNTER: ICD-10-CM

## 2024-07-30 DIAGNOSIS — M25.572 LEFT ANKLE PAIN: ICD-10-CM

## 2024-07-30 PROCEDURE — 97016 VASOPNEUMATIC DEVICE THERAPY: CPT | Mod: GP,CQ

## 2024-07-30 PROCEDURE — 97110 THERAPEUTIC EXERCISES: CPT | Mod: GP,CQ

## 2024-07-30 PROCEDURE — 97112 NEUROMUSCULAR REEDUCATION: CPT | Mod: GP,CQ

## 2024-07-30 ASSESSMENT — PAIN SCALES - GENERAL: PAINLEVEL_OUTOF10: 5 - MODERATE PAIN

## 2024-07-30 NOTE — PROGRESS NOTES
Physical Therapy Treatment    Patient Name: William Abrams  MRN: [id number]  Today's Date: 7/30/2024  Time Calculation  Start Time: 0840  Stop Time: 0930  Time Calculation (min): 50 min    Insurance:   Visit #: 6  Approved number of visits: 8  Insurance: grayson  Auth date 6/24/24  to 9/30/24      Current Problem  1. Closed low lateral malleolus fracture, left, initial encounter  Follow Up In Physical Therapy      2. Left ankle pain  Follow Up In Physical Therapy          General     Precautions  Precautions  Precautions Comment: none  Pain  0-10 (Numeric) Pain Score: 5 - Moderate pain    Subjective:   Patient reports pain about 5/10 L ankle.  Sees Dr. Guerra for a FLU on 8/21/24.      Compliant with HEP? yes    Objective:   Function: entered clinic in walking boot.  Normal gait in shoes.      Treatments:   Bike (seat# 1)- 4'  DBE 2x1.5'  Bosu step up/hold alternating- 1.5'  Biodex balance LOS L11- 36%, 40%  KB 4kg tandem stand R/L, L/R cw/ccw- 1' ea dir.  Leg press 90# 1x20  Wt bd- PF15#/DF15#  IN10#/EV5#- 1.5' ea.   SS simon Gastroc/Soleus- 1' ea.    Charges: TE 2, NME 1, Vaso    Assessment:   Challenged by the weight board in inversion/eversion.  Otherwise seems to be coming along well.    Plan:   Continue increasing left ankle rom, balance, strength, endurance and function for return to pain free ADL.

## 2024-08-02 ENCOUNTER — HOSPITAL ENCOUNTER (OUTPATIENT)
Dept: RADIOLOGY | Facility: HOSPITAL | Age: 34
Discharge: HOME | End: 2024-08-02
Payer: COMMERCIAL

## 2024-08-02 VITALS
OXYGEN SATURATION: 100 % | SYSTOLIC BLOOD PRESSURE: 107 MMHG | DIASTOLIC BLOOD PRESSURE: 72 MMHG | HEART RATE: 66 BPM | TEMPERATURE: 97.6 F | RESPIRATION RATE: 16 BRPM

## 2024-08-02 DIAGNOSIS — S82.201S FRACTURE OF TIBIA WITH FIBULA, RIGHT, OPEN, SEQUELA: ICD-10-CM

## 2024-08-02 DIAGNOSIS — S82.401S FRACTURE OF TIBIA WITH FIBULA, RIGHT, OPEN, SEQUELA: ICD-10-CM

## 2024-08-02 DIAGNOSIS — G90.50 COMPLEX REGIONAL PAIN SYNDROME TYPE 1, AFFECTING UNSPECIFIED SITE: ICD-10-CM

## 2024-08-02 PROCEDURE — 99152 MOD SED SAME PHYS/QHP 5/>YRS: CPT | Performed by: ANESTHESIOLOGY

## 2024-08-02 PROCEDURE — 64520 N BLOCK LUMBAR/THORACIC: CPT | Mod: RT | Performed by: ANESTHESIOLOGY

## 2024-08-02 PROCEDURE — 64520 N BLOCK LUMBAR/THORACIC: CPT | Performed by: ANESTHESIOLOGY

## 2024-08-02 PROCEDURE — 77003 FLUOROGUIDE FOR SPINE INJECT: CPT | Performed by: ANESTHESIOLOGY

## 2024-08-02 PROCEDURE — 2500000004 HC RX 250 GENERAL PHARMACY W/ HCPCS (ALT 636 FOR OP/ED): Performed by: ANESTHESIOLOGY

## 2024-08-02 RX ORDER — MIDAZOLAM HYDROCHLORIDE 1 MG/ML
INJECTION, SOLUTION INTRAMUSCULAR; INTRAVENOUS
Status: COMPLETED | OUTPATIENT
Start: 2024-08-02 | End: 2024-08-02

## 2024-08-02 ASSESSMENT — PAIN - FUNCTIONAL ASSESSMENT
PAIN_FUNCTIONAL_ASSESSMENT: WONG-BAKER FACES
PAIN_FUNCTIONAL_ASSESSMENT: 0-10
PAIN_FUNCTIONAL_ASSESSMENT: WONG-BAKER FACES

## 2024-08-02 ASSESSMENT — PAIN SCALES - GENERAL
PAINLEVEL_OUTOF10: 8
PAINLEVEL_OUTOF10: 6
PAINLEVEL_OUTOF10: 5 - MODERATE PAIN

## 2024-08-02 NOTE — H&P
"HISTORY AND PHYSICAL    History Of Present Illness  William Abrams \"Aleksander\" is a 34 y.o. female presenting with chronic pain. Here for  Right Lumbar Sympathetic Block  for CRPS of RLE secondary to traumatic tibial fracture.  she denies any recent antibiotic use or infections, she denies any blood thinner use , she denies contrast or local anesthetic allergies , and she has no known medical allergies.    Past Medical History  Past Medical History:   Diagnosis Date    Disease of stomach and duodenum, unspecified     Stomach problems    Encounter for gynecological examination (general) (routine) without abnormal findings 02/12/2016    Well woman exam    Headache, unspecified 12/30/2013    Headache    Other conditions influencing health status     Menstruation    Other conditions influencing health status     History of pregnancy    Presence of (intrauterine) contraceptive device     IUD contraception       Surgical History  No past surgical history on file.     Social History  She reports that she has been smoking cigarettes. She does not have any smokeless tobacco history on file. She reports that she does not currently use alcohol. She reports that she does not use drugs.    Family History  No family history on file.     Allergies  Latex    Review of Systems   12 point ROS done and negative except for the above.     Physical Exam     General: NAD, well groomed, well nourished  Eyes: Non-icteric sclera, EOMI  Ears, Nose, Mouth, and Throat: External ears and nose appear to be without deformity or rash. No lesions or masses noted. Hearing is grossly intact.   Neck: Trachea midline  Respiratory: Nonlabored breathing   Cardiovascular: No peripheral edema   Skin: No rashes or open lesions/ulcers identified on skin.    Last Recorded Vitals  There were no vitals taken for this visit.    Relevant Results           Assessment/Plan       Risks, benefits, alternatives discussed. All questions answered to the best of my ability. " Patient agrees to proceed.   -We will proceed with planned procedure        Kalen Jamison MD  Anesthesiology, PGY-2

## 2024-08-02 NOTE — PROCEDURES
Pre-Op Diagnosis: Complex regional pain syndrome of the right lower extremity  Post-Procedure Diagnosis: Same as preop diagnosis  Procedure: Lumbar sympathetic block on the right side at L3  Surgeon: Felipe Centeno MD, PhD   Resident/Fellow/Other Assistant: Bob Beth MD     Procedure Note:     Ms. Aleksander Abrams is a 34 y.o. female with right lower extremity complex regional pain syndrome presenting for her first lumbar sympathetic block.  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    Following appropriate informed consent, the patient was brought to the operating room and placed in the prone position.  The low back region was prepped with chlorhexidine and draped in usual sterile fashion.  Using fluoroscopic guidance the skin and subcutaneous tissue overlying needle trajectory to the appropriate anatomic landmarks at the lower aspect of the right L3 vertebral body was anesthetized with a total of 8 cc 0.5% lidocaine.  A 6-inch long Chiba needle was then advanced under fluoroscopic guidance to the junction of the upper one third and lower two thirds of the L3 vertebral body in the lateral fluoroscopic view on the anterior aspect of the vertebral body.  Injection of iohexol contrast revealed appropriate spread in the prevertebral fascia area.  As such, 15 cc 0.5% ropivacaine were injected and the needle was removed.  The patient was then transferred to the recovery room.  In the recovery area, the patient had elevation of the skin temperature on the right foot but not the left.  She will update us on outpatient basis regarding her analgesic response to the injection.

## 2024-08-06 ENCOUNTER — PATIENT MESSAGE (OUTPATIENT)
Dept: ORTHOPEDIC SURGERY | Facility: CLINIC | Age: 34
End: 2024-08-06
Payer: COMMERCIAL

## 2024-08-06 NOTE — PROGRESS NOTES
William Abrams is a 34 y.o. female with past medical history of complex regional pain syndrome who is referred by PCP Dr. Corinne Hooker for for abdominal pain and loose stool. She reports a 6 month history of upper abdominal pain. Worse after eating, no matter what she eats. Feels like cramping. Associated with nausea, reflux, and lots of belching. Since February has taken Protonix 40 mg with no change. Notes stools are looser and have been urgent at times. Has not had a solid BM in months. Appear dark and have foul smell. No unintentional weight loss. No iron deficiency anemia. CRP, sed rate, and celiac serologies normal. H. Pylori stool testing ordered by PCP pending. She does not take NSAIDS regularly. EGD as a teenager. Told she had food intolerance. Records not available today.    Social history: Smokes tobacco. Occasional alcohol. Denies illicit drug use.    Family history: Denies family history of ulcers, colon cancer ,or other GI disorders or malignancy. HTN and diabetes in the family.     Past Medical History:   Diagnosis Date    Disease of stomach and duodenum, unspecified     Stomach problems    Encounter for gynecological examination (general) (routine) without abnormal findings 02/12/2016    Well woman exam    Headache, unspecified 12/30/2013    Headache    Other conditions influencing health status     Menstruation    Other conditions influencing health status     History of pregnancy    Presence of (intrauterine) contraceptive device     IUD contraception     Current Outpatient Medications   Medication Sig Dispense Refill    amitriptyline (Elavil) 10 mg tablet Take 1 tablet (10 mg) by mouth once daily at bedtime. 30 tablet 3    gabapentin (Neurontin) 800 mg tablet Take 1 tablet (800 mg) by mouth 3 times a day. 90 tablet 11    levonorgestreL (Liletta) 20.4 mcg/24 hrs (8 yrs) 52 mg intrauterine device 52 mg by intrauterine route once daily.      loratadine (Claritin) 10 mg tablet Take 1 tablet (10  mg) by mouth once daily as needed.      NON FORMULARY Take 1 each by mouth once daily. Tumeric suppliment      topiramate (Topamax) 25 mg tablet Take 1 tablet (25 mg) by mouth once daily. Take 1 tab at bedtime x7 days --> 1 BID x7 days--> 1 qAM 2 qHS x7 days--> 2 BID x7 days-->2 qAM, 3 qHS x7 days--> 3 BID x7 days --> 3 qAM, 4 at bedtime x7 days -->4 BID & call the office for the 100 mg strength 196 tablet 0    pantoprazole (ProtoNix) 40 mg EC tablet Take 1 tablet by mouth once daily.      sucralfate (Carafate) 100 mg/mL suspension Take 10 mL (1 g) by mouth 4 times a day with meals. Take 1 hour before meals and at bedtime 1200 mL 11     No current facility-administered medications for this visit.     Review of Systems  Review of Systems negative except as noted in HPI.    Objective     /72   Pulse 78   Temp 37 °C (98.6 °F)   Resp 19   Wt 55.9 kg (123 lb 4.8 oz)   BMI 22.55 kg/m²      Physical Exam  Constitutional:  No acute distress. Normal appearance. Not ill-appearing.  HENT:  Head normocephalic and atraumatic. Conjunctivae normal.  Cardiovascular:  Normal rate. Regular rhythm.  Pulmonary:  Pulmonary effort normal. No respiratory distress. Breath sounds clear.  Abdominal:  Abdomen is flat and soft. There is no distension. No tenderness or guarding.  Skin: Dry.  Neurological:  Alert and oriented.  Psychiatric:  Mood and affect normal.    Assessment/Plan     34 y.o. female who presents today for initial clinic visit for 6 month history of upper abdominal pain, nausea, vomiting, reflux, and loose dark stools despite adequate PPI therapy. No iron deficiency anemia. CRP, sed rate, and celiac serologies normal. EGD is indicated. We will also obtain labs and stool tests for completeness. If fecal calprotectin is elevated she will require colonoscopy as well.    Recommendations  Continue Pantoprazole daily.  Add Carafate suspension 30 minutes before meals and at bedtime.  Complete labs and stool test.  Schedule  EGD.  Follow up after procedure.     Electronically signed by: Candice Porter CNP on 8/13/2024 at 2:52 PM

## 2024-08-13 ENCOUNTER — LAB (OUTPATIENT)
Dept: LAB | Facility: LAB | Age: 34
End: 2024-08-13
Payer: COMMERCIAL

## 2024-08-13 ENCOUNTER — OFFICE VISIT (OUTPATIENT)
Dept: GASTROENTEROLOGY | Facility: HOSPITAL | Age: 34
End: 2024-08-13
Payer: COMMERCIAL

## 2024-08-13 ENCOUNTER — TREATMENT (OUTPATIENT)
Dept: PHYSICAL THERAPY | Facility: CLINIC | Age: 34
End: 2024-08-13
Payer: COMMERCIAL

## 2024-08-13 VITALS
DIASTOLIC BLOOD PRESSURE: 72 MMHG | TEMPERATURE: 98.6 F | SYSTOLIC BLOOD PRESSURE: 108 MMHG | HEART RATE: 78 BPM | RESPIRATION RATE: 19 BRPM | BODY MASS INDEX: 22.55 KG/M2 | WEIGHT: 123.3 LBS

## 2024-08-13 DIAGNOSIS — R19.5 LOOSE STOOLS: ICD-10-CM

## 2024-08-13 DIAGNOSIS — K92.1 BLACK STOOLS: ICD-10-CM

## 2024-08-13 DIAGNOSIS — S82.62XA CLOSED LOW LATERAL MALLEOLUS FRACTURE, LEFT, INITIAL ENCOUNTER: ICD-10-CM

## 2024-08-13 DIAGNOSIS — M25.572 LEFT ANKLE PAIN: ICD-10-CM

## 2024-08-13 DIAGNOSIS — R10.84 GENERALIZED ABDOMINAL PAIN: Primary | ICD-10-CM

## 2024-08-13 DIAGNOSIS — R10.84 GENERALIZED ABDOMINAL PAIN: ICD-10-CM

## 2024-08-13 DIAGNOSIS — R12 BURNING REFLUX: ICD-10-CM

## 2024-08-13 LAB
GLIADIN PEPTIDE IGA SER IA-ACNC: 1 U/ML
TTG IGA SER IA-ACNC: <1 U/ML

## 2024-08-13 PROCEDURE — 99244 OFF/OP CNSLTJ NEW/EST MOD 40: CPT | Performed by: NURSE PRACTITIONER

## 2024-08-13 PROCEDURE — 83516 IMMUNOASSAY NONANTIBODY: CPT

## 2024-08-13 PROCEDURE — 97016 VASOPNEUMATIC DEVICE THERAPY: CPT | Mod: GP,CQ

## 2024-08-13 PROCEDURE — 97112 NEUROMUSCULAR REEDUCATION: CPT | Mod: GP,CQ

## 2024-08-13 PROCEDURE — 97014 ELECTRIC STIMULATION THERAPY: CPT | Mod: GP,CQ

## 2024-08-13 PROCEDURE — 36415 COLL VENOUS BLD VENIPUNCTURE: CPT

## 2024-08-13 RX ORDER — SUCRALFATE 1 G/10ML
1 SUSPENSION ORAL
Qty: 1200 ML | Refills: 11 | Status: SHIPPED | OUTPATIENT
Start: 2024-08-13 | End: 2025-08-13

## 2024-08-13 ASSESSMENT — PAIN SCALES - GENERAL
PAINLEVEL: 7
PAINLEVEL_OUTOF10: 7

## 2024-08-13 ASSESSMENT — PAIN - FUNCTIONAL ASSESSMENT: PAIN_FUNCTIONAL_ASSESSMENT: 0-10

## 2024-08-13 NOTE — PATIENT INSTRUCTIONS
Recommendations  Continue Pantoprazole daily.  Add Carafate suspension 30 minutes before meals and at bedtime.  Complete labs and stool test.  Schedule EGD (upper endoscopy). You can call [phone] to schedule.  Follow up 2 weeks after procedure. Please call the office at [phone] with any questions or concerns.

## 2024-08-13 NOTE — PROGRESS NOTES
Physical Therapy Treatment    Patient Name: William Abrams  MRN: [id number]  Today's Date: 8/13/2024  Time Calculation  Start Time: 0835  Stop Time: 0910  Time Calculation (min): 35 min    Insurance:   Visit #: 6  Approved number of visits: 9  Insurance: grayson  Auth date 6/24/24  to 9/30/24      Current Problem  1. Closed low lateral malleolus fracture, left, initial encounter  Follow Up In Physical Therapy      2. Left ankle pain  Follow Up In Physical Therapy          General   Reason for Referral: L ankle fx   Referred By: Zurdo Guerra MD  Precautions  Precautions  Precautions Comment: none  Pain  Pain Assessment: 0-10  0-10 (Numeric) Pain Score: 7    Subjective:   Patient reports that her pain is about a 7 today and that she had a pain management shot and really isn't feeling that well today.    Compliant with HEP? yes    Objective:   Function: entered clinic in walking boot.    Treatments:   Bike (seat# 1)- 4'  DBE 2x1.5'  Biodex balance LOS L11- 33%, 53%  KB 4kg tandem stand R/L, L/R cw/ccw- 1' ea dir.  MENS .3/40 Pads Rt. Knee incisions, ML Rt. Ankle- 20' (simultaneous with GR)  GR Rt. Ankle (low)- 20' (simultaneous with MENS)    Charges:  NME 1, ESU, Vaso    Assessment:   Had a lot of pain performing just the balance exercises.  Thereafter treated for pain.    Plan:   Continue increasing left ankle rom, balance, strength, endurance and function for return to pain free ADL.

## 2024-08-15 ENCOUNTER — APPOINTMENT (OUTPATIENT)
Dept: PAIN MEDICINE | Facility: CLINIC | Age: 34
End: 2024-08-15
Payer: COMMERCIAL

## 2024-08-15 DIAGNOSIS — S82.201S TIBIA AND FIBULA OPEN FRACTURE, RIGHT, SEQUELA: ICD-10-CM

## 2024-08-15 DIAGNOSIS — S82.201B TIBIA/FIBULA FRACTURE, RIGHT, OPEN TYPE I OR II, INITIAL ENCOUNTER: ICD-10-CM

## 2024-08-15 DIAGNOSIS — S82.401B TIBIA/FIBULA FRACTURE, RIGHT, OPEN TYPE I OR II, INITIAL ENCOUNTER: ICD-10-CM

## 2024-08-15 DIAGNOSIS — G90.529 COMPLEX REGIONAL PAIN SYNDROME TYPE 1 OF LOWER EXTREMITY, UNSPECIFIED LATERALITY: ICD-10-CM

## 2024-08-15 DIAGNOSIS — S82.401S TIBIA AND FIBULA OPEN FRACTURE, RIGHT, SEQUELA: ICD-10-CM

## 2024-08-15 DIAGNOSIS — R26.2 DIFFICULTY WALKING: ICD-10-CM

## 2024-08-15 DIAGNOSIS — G57.71 COMPLEX REGIONAL PAIN SYNDROME TYPE 2 OF RIGHT LOWER EXTREMITY: ICD-10-CM

## 2024-08-15 PROCEDURE — 99214 OFFICE O/P EST MOD 30 MIN: CPT | Performed by: ANESTHESIOLOGY

## 2024-08-15 RX ORDER — PREDNISONE 20 MG/1
20 TABLET ORAL DAILY
Qty: 63 TABLET | Refills: 0 | Status: SHIPPED | OUTPATIENT
Start: 2024-08-15 | End: 2024-10-17

## 2024-08-15 ASSESSMENT — PAIN - FUNCTIONAL ASSESSMENT: PAIN_FUNCTIONAL_ASSESSMENT: 0-10

## 2024-08-15 ASSESSMENT — PAIN SCALES - GENERAL: PAINLEVEL_OUTOF10: 7

## 2024-08-15 NOTE — PROGRESS NOTES
"HPI:   William Abarms is a 34 y.o. F with a past medical history of right tibial fracture after a fall going upstairs status post right tibial nail with Dr. Guerra in November 2023. Since then she has had persistent right lower extremity pain.  She was diagnosed by us as having complex regional pain syndrome type one of the right lower extremity.  She most recently had a lumbar sympathetic block done on 8/2/24 to target her RLE CRPS symptomatology. However, the pain seems to have worsened since and is described as \"heated glass traveling down from her back down to her right lower extremity. She is currently taking Topamax and Amitriptyline which she says has caused her to break out in hives and itch all over. She also says they have not helped with pain or sleep and has had to double her gabapentin dose to help take the edge off.    She denies any other significant medical history, denies any other previous surgeries, and denies taking any anti-coagulation.     Physical Therapy: The patient has done six or more weeks of physical therapy in the past six months with minimal improvement  Other Conservative Measures she has tried: Heating Pad and Ice  Classes of medications tried in the past: Acetaminophen, NSAIDs, Topamax, Amitriptyline, and Gabapentenoids     Review of Systems  13-point ROS done and negative except for HPI.           Current Outpatient Medications   Medication Instructions    amitriptyline (ELAVIL) 10 mg, oral, Nightly    docusate sodium (COLACE) 100 mg, oral, 2 times daily    gabapentin (NEURONTIN) 800 mg, oral, 3 times daily    Liletta 52 mg, intrauterine, Daily    loratadine (Claritin) 10 mg tablet 1 tablet, oral, Daily    NON FORMULARY 1 each, oral, Daily, Tumeric suppliment    pantoprazole (ProtoNix) 40 mg EC tablet 1 tablet, oral, Daily RT    topiramate (TOPAMAX) 25 mg, oral, Daily, Take 1 tab at bedtime x7 days --> 1 BID x7 days--> 1 qAM 2 qHS x7 days--> 2 BID x7 days-->2 qAM, 3 qHS x7 days--> " 3 BID x7 days --> 3 qAM, 4 at bedtime x7 days -->4 BID & call the office for the 100 mg strength         Medical History        Past Medical History:   Diagnosis Date    Disease of stomach and duodenum, unspecified       Stomach problems    Encounter for gynecological examination (general) (routine) without abnormal findings 02/12/2016     Well woman exam    Headache, unspecified 12/30/2013     Headache    Other conditions influencing health status       Menstruation    Other conditions influencing health status       History of pregnancy    Presence of (intrauterine) contraceptive device       IUD contraception            Surgical History   No past surgical history on file.         Family History   No family history on file.         RX Allergies        Allergies   Allergen Reactions    Latex Unknown         Objective  Physical Exam  General: NAD, well groomed, well nourished  Eyes: Non-icteric sclera, EOMI  Ears, Nose, Mouth, and Throat: External ears and nose appear to be without deformity or rash. No lesions or masses noted. Hearing is grossly intact.   Neck: Trachea midline  Respiratory: Nonlabored breathing   Cardiovascular: no peripheral edema   Skin: No rashes or open lesions/ulcers identified on skin.     Back:   Palpation: No tenderness to palpation over lumbar paraspinous muscles.      Neurologic:   Cranial nerves grossly intact.   Strength 4/5 plantar and dorsiflexion   CRPS Exam:  - hyperalgesia to light touch and deep muscle pressure of her RLE  - left calf measurement: 33cm, right calf measurement: 34cm  - diminished sensation to hot temperature over right LE compared to LLE     Psychiatric: Alert, orientation to person, place, and time. Cooperative.    Assessment/Plan  William Abrams is a 35 yo F with PMHx of right traumatic tibial fracture s/p tibial nail s/p lumbar sympathectomy on 8/2/24 with persistent and worsened RLE pain and new onset back pain since the block. She reports symptoms of increased  pain, temperature changes (burning sensation), discoloration, and edema. On exam, she has significant deep tissue hyperalgesia of her RLE past her knee, edema and visible motor atrophy of her RLE compared to LLE. She was also started on Topamax and Amitriptyline which per patient has not helped her pain nor sleep but rather has made her itch and break out in hives. She also endorses having to double her gabapentin dose to take the edge off. Given her failed response to lumbar sympathetic block and clinical exam we will refer her for ketamine infusions. We will also start her on a high dose cortisone therapy, risks and benefits and titration doses was explained to the patient who expressed understanding. Reiterated the importance of physical therapy exercises at home, and the the mainstay treatment is to continue exercises. We advised patient wean to discontinue Topiramate and Amitriptyline and scheduled her for a lumbar spine MRI. Additionally, we will refer patient to Dr August for scrambler therapy as we think she may benefit from it at this time.  Given pain in the back and potential radicular symptoms, the patient may benefit from an MRI of the lumbar spine.    Plan:  - High dose CRPS corticosteroid titration therapy, discussed side effects of medications, and that should she experience any side effects to stop the medications and let our office know  - referral for ketamine infusions  - referral to Dr August for scrambler therapy  - Lumbar spine MRI  - we recommended a TENS unit for the patient to help with her pain  - patient to continue physical therapy home exercises and continue aqua therapy as tolerated  - dorsal column neuromodulation deferred at this time,  may consider in the future.  - wean to discontinue amitriptyline and topiramate as she seems to be intolerant to the medications per patient report of hives and itching.     The patient was invited to contact us back anytime with any questions or  concerns and follow-up with us in the office as needed.      Diagnoses and all orders for this visit:  Fracture of tibia with fibula, right, open, sequela Complex regional pain syndrome type 1, affecting unspecified site       This note was generated with the aid of dictation software, there may be typos despite my attempts at proofreading.     The patient was discussed and seen with Dr. Centeno.

## 2024-08-16 LAB
GLIADIN PEPTIDE IGG SER IA-ACNC: <0.56 FLU (ref 0–4.99)
TTG IGG SER IA-ACNC: <0.82 FLU (ref 0–4.99)

## 2024-08-19 ENCOUNTER — APPOINTMENT (OUTPATIENT)
Dept: PAIN MEDICINE | Facility: CLINIC | Age: 34
End: 2024-08-19
Payer: COMMERCIAL

## 2024-08-21 ENCOUNTER — APPOINTMENT (OUTPATIENT)
Dept: PHYSICAL THERAPY | Facility: CLINIC | Age: 34
End: 2024-08-21
Payer: COMMERCIAL

## 2024-08-21 ENCOUNTER — HOSPITAL ENCOUNTER (OUTPATIENT)
Dept: RADIOLOGY | Facility: CLINIC | Age: 34
Discharge: HOME | End: 2024-08-21
Payer: COMMERCIAL

## 2024-08-21 ENCOUNTER — OFFICE VISIT (OUTPATIENT)
Dept: ORTHOPEDIC SURGERY | Facility: CLINIC | Age: 34
End: 2024-08-21
Payer: COMMERCIAL

## 2024-08-21 VITALS — HEIGHT: 62 IN | WEIGHT: 123 LBS | BODY MASS INDEX: 22.63 KG/M2

## 2024-08-21 DIAGNOSIS — G90.50 COMPLEX REGIONAL PAIN SYNDROME TYPE 1, AFFECTING UNSPECIFIED SITE: ICD-10-CM

## 2024-08-21 DIAGNOSIS — S82.62XA CLOSED LOW LATERAL MALLEOLUS FRACTURE, LEFT, INITIAL ENCOUNTER: ICD-10-CM

## 2024-08-21 DIAGNOSIS — M25.572 LEFT ANKLE PAIN: ICD-10-CM

## 2024-08-21 DIAGNOSIS — S82.62XA CLOSED LOW LATERAL MALLEOLUS FRACTURE, LEFT, INITIAL ENCOUNTER: Primary | ICD-10-CM

## 2024-08-21 PROCEDURE — 73610 X-RAY EXAM OF ANKLE: CPT | Mod: LT

## 2024-08-21 PROCEDURE — 97140 MANUAL THERAPY 1/> REGIONS: CPT | Mod: GP

## 2024-08-21 PROCEDURE — 3008F BODY MASS INDEX DOCD: CPT | Performed by: ORTHOPAEDIC SURGERY

## 2024-08-21 PROCEDURE — 97110 THERAPEUTIC EXERCISES: CPT | Mod: GP

## 2024-08-21 PROCEDURE — 99214 OFFICE O/P EST MOD 30 MIN: CPT | Performed by: ORTHOPAEDIC SURGERY

## 2024-08-21 ASSESSMENT — PAIN SCALES - GENERAL
PAINLEVEL_OUTOF10: 7
PAINLEVEL_OUTOF10: 5 - MODERATE PAIN

## 2024-08-21 NOTE — PROGRESS NOTES
34-year-old female following up about 9 months out from an open right tibia fracture with very severe complex regional pain syndrome postoperatively.  She has tried nerve blocks and has had several evaluations by pain management.  She is now recommended for possible spinal cord stimulator or ketamine injections.  I am also seeing her for a left foot fracture.  Her left ankle is doing very well and she is out of all assistive devices.  She is using a brace in the form of a boot for the right leg and also has been having some knee instability and weakness due to her pain.    She is extremely hyperesthetic on physical examination and her entirety of her skin from her distal femur to her foot is painful for her and also sensitive to temperature changes as well as even liquid on the leg or sunlight.  She has good ankle and knee range of motion but she is very apprehensive at the ankle and the knee and is demonstrating some signs of knee instability because of the lack of quadriceps tendon.    The left ankle does not have any pain to palpation over the distal fibula and is stable on range of motion examination and is neurovascular intact.    I personally reviewed multiple views of the left ankle were obtained in the office today demonstrate maintenance of reduction and significant interval healing.    34-year-old male with advanced CRPS on the right leg and left-sided distal fibular fracture.  Her left ankle is essentially healed at this point clinically and radiographically.  Her right tibia still a significant problem.  I provided her with a knee sleeve with hinges to help support her knee and she will follow-up regarding the stimulator therapy and cutting injections and pain management.  I will see her back in 3 months time with 2 views right tibia and 3 views left ankle.  Patient was prescribed a knee sleeve with hinges for tibia fracture.The patient is ambulatory with or without aid; but, has weakness, instability  and/or deformity of their right lower extremity which requires stabilization from this orthosis to improve their function.      Verbal and written instructions for the use, wear schedule, cleaning and application of this item were given.  Patient was instructed that should the orthotic device result in increased pain, decreased sensation, increased swelling, or an overall worsening of their medical condition, to please contact our office immediately.     Orthotic management and training was provided for skin care, modifications due to healing tissues, edema changes, interruption in skin integrity, and safety precautions with the orthosis.      Natural History reviewed. All questions answered. The patient was in agreement with the plan.      **This note was created using voice recognition software and was not corrected for typographical or grammatical errors.**

## 2024-08-21 NOTE — PROGRESS NOTES
Physical Therapy Treatment    Patient Name: William Abrams  MRN: [id number]  Today's Date: 8/21/2024  Time Calculation  Start Time: 1030  Stop Time: 1100  Time Calculation (min): 30 min    Insurance:   Visit #: 7  Approved number of visits: 9  Insurance: grayson  Auth date 6/24/24  to 9/30/24      Current Problem  1. Closed low lateral malleolus fracture, left, initial encounter  Follow Up In Physical Therapy      2. Left ankle pain  Follow Up In Physical Therapy      3. Complex regional pain syndrome type 1, affecting unspecified site  PT eval and treat          General  Reason for Referral: L ankle fx  Referred By: sherif  Precautions  Precautions  Precautions Comment: none  Pain  0-10 (Numeric) Pain Score: 7    Subjective:     Patient reports ankle R has been worse since her nerve shot, back in the boot.  Broke out in hives after shot or new medication she was taking from pain management.  Having to rely on the boot as she can't stand on R leg without sharp pain in the leg.  Does feel like the left ankle is better but feels like progress has slowed since she had the injection and increased pain.        Got appointments confused and came 15 min late.     Compliant with HEP? yes    Objective:     Ankle ROM  DF 15/14  PF 54/54  IV 30/34  EV  27/20    Ankle MMT  DF 5/5  PF 5/5  IV 5/5  EV 5/5  Toe ext 5/5  Toe flexion 5/5    Amb in boot on R foot, antalgic gait, without boot lacking heel strike and push off with stiff leg.     Treatments:     Recheck  AP to TC joint L  STM to ATFL  Instruct in self management of pain and desensitization of R LE.     Charges: TE x 1 man x 1    Assessment: ankle range and strength improved and equal B LE.  Instructed in self STM to decrease remaining stiffness in L ankle and continued STM in R for desensitization.  Pt cancelled eval for R ankle and instructed to follow up with therapist trained in mirror therapy for R ankle and her ROM and strength are WFL just pain with Wbing due to  CRPS.  Pt understood and agreed.       Plan: discharge to independent [facility] for L, follow up with mirror therapy for R ankle

## 2024-08-22 ENCOUNTER — APPOINTMENT (OUTPATIENT)
Dept: PHYSICAL THERAPY | Facility: CLINIC | Age: 34
End: 2024-08-22
Payer: COMMERCIAL

## 2024-09-03 DIAGNOSIS — G90.50 COMPLEX REGIONAL PAIN SYNDROME TYPE 1, AFFECTING UNSPECIFIED SITE: ICD-10-CM

## 2024-09-05 ENCOUNTER — APPOINTMENT (OUTPATIENT)
Dept: PAIN MEDICINE | Facility: CLINIC | Age: 34
End: 2024-09-05
Payer: COMMERCIAL

## 2024-09-06 ENCOUNTER — APPOINTMENT (OUTPATIENT)
Dept: ORTHOPEDIC SURGERY | Facility: HOSPITAL | Age: 34
End: 2024-09-06
Payer: COMMERCIAL

## 2024-09-06 ENCOUNTER — APPOINTMENT (OUTPATIENT)
Dept: RADIOLOGY | Facility: CLINIC | Age: 34
End: 2024-09-06
Payer: COMMERCIAL

## 2024-09-10 DIAGNOSIS — G90.521 COMPLEX REGIONAL PAIN SYNDROME TYPE 1 OF RIGHT LOWER EXTREMITY: Primary | ICD-10-CM

## 2024-09-10 RX ORDER — TOPIRAMATE 100 MG/1
100 TABLET, FILM COATED ORAL 2 TIMES DAILY
Qty: 60 TABLET | Refills: 11 | Status: SHIPPED | OUTPATIENT
Start: 2024-09-10 | End: 2025-09-10

## 2024-09-12 DIAGNOSIS — G90.519 COMPLEX REGIONAL PAIN SYNDROME TYPE 1 OF UPPER EXTREMITY, UNSPECIFIED LATERALITY: ICD-10-CM

## 2024-09-16 ENCOUNTER — APPOINTMENT (OUTPATIENT)
Dept: RADIOLOGY | Facility: HOSPITAL | Age: 34
End: 2024-09-16
Payer: COMMERCIAL

## 2024-09-16 ENCOUNTER — HOSPITAL ENCOUNTER (EMERGENCY)
Facility: HOSPITAL | Age: 34
Discharge: HOME | End: 2024-09-17
Payer: COMMERCIAL

## 2024-09-16 VITALS
HEIGHT: 64 IN | DIASTOLIC BLOOD PRESSURE: 75 MMHG | BODY MASS INDEX: 19.63 KG/M2 | HEART RATE: 72 BPM | TEMPERATURE: 97.9 F | SYSTOLIC BLOOD PRESSURE: 110 MMHG | WEIGHT: 115 LBS | OXYGEN SATURATION: 100 % | RESPIRATION RATE: 18 BRPM

## 2024-09-16 DIAGNOSIS — S51.851A DOG BITE OF RIGHT FOREARM, INITIAL ENCOUNTER: Primary | ICD-10-CM

## 2024-09-16 DIAGNOSIS — S62.101B OPEN FRACTURE OF RIGHT WRIST, INITIAL ENCOUNTER: ICD-10-CM
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
## 2024-09-16 PROCEDURE — 2500000001 HC RX 250 WO HCPCS SELF ADMINISTERED DRUGS (ALT 637 FOR MEDICARE OP): Performed by: STUDENT IN AN ORGANIZED HEALTH CARE EDUCATION/TRAINING PROGRAM

## 2024-09-16 PROCEDURE — 73030 X-RAY EXAM OF SHOULDER: CPT | Mod: RIGHT SIDE | Performed by: RADIOLOGY

## 2024-09-16 PROCEDURE — 29125 APPL SHORT ARM SPLINT STATIC: CPT | Mod: RT

## 2024-09-16 PROCEDURE — 99284 EMERGENCY DEPT VISIT MOD MDM: CPT

## 2024-09-16 PROCEDURE — 73110 X-RAY EXAM OF WRIST: CPT | Mod: LT

## 2024-09-16 PROCEDURE — 73110 X-RAY EXAM OF WRIST: CPT | Mod: RIGHT SIDE | Performed by: RADIOLOGY

## 2024-09-16 PROCEDURE — 73030 X-RAY EXAM OF SHOULDER: CPT | Mod: RT

## 2024-09-16 PROCEDURE — 73110 X-RAY EXAM OF WRIST: CPT | Mod: RT

## 2024-09-16 PROCEDURE — 73090 X-RAY EXAM OF FOREARM: CPT | Mod: RIGHT SIDE | Performed by: RADIOLOGY

## 2024-09-16 PROCEDURE — 73090 X-RAY EXAM OF FOREARM: CPT | Mod: RT

## 2024-09-16 RX ORDER — IBUPROFEN 400 MG/1
800 TABLET ORAL ONCE
Status: DISCONTINUED | OUTPATIENT
Start: 2024-09-16 | End: 2024-09-16

## 2024-09-16 RX ORDER — AMOXICILLIN AND CLAVULANATE POTASSIUM 875; 125 MG/1; MG/1
1 TABLET, FILM COATED ORAL ONCE
Status: COMPLETED | OUTPATIENT
Start: 2024-09-16 | End: 2024-09-16

## 2024-09-16 RX ORDER — AMOXICILLIN AND CLAVULANATE POTASSIUM 875; 125 MG/1; MG/1
1 TABLET, FILM COATED ORAL EVERY 12 HOURS
Qty: 14 TABLET | Refills: 0 | Status: SHIPPED | OUTPATIENT
Start: 2024-09-16 | End: 2024-09-23

## 2024-09-16 RX ORDER — OXYCODONE AND ACETAMINOPHEN 5; 325 MG/1; MG/1
1 TABLET ORAL ONCE
Status: COMPLETED | OUTPATIENT
Start: 2024-09-16 | End: 2024-09-16

## 2024-09-16 ASSESSMENT — COLUMBIA-SUICIDE SEVERITY RATING SCALE - C-SSRS
2. HAVE YOU ACTUALLY HAD ANY THOUGHTS OF KILLING YOURSELF?: NO
6. HAVE YOU EVER DONE ANYTHING, STARTED TO DO ANYTHING, OR PREPARED TO DO ANYTHING TO END YOUR LIFE?: NO
1. IN THE PAST MONTH, HAVE YOU WISHED YOU WERE DEAD OR WISHED YOU COULD GO TO SLEEP AND NOT WAKE UP?: NO

## 2024-09-16 ASSESSMENT — PAIN - FUNCTIONAL ASSESSMENT: PAIN_FUNCTIONAL_ASSESSMENT: 0-10

## 2024-09-16 ASSESSMENT — PAIN SCALES - GENERAL: PAINLEVEL_OUTOF10: 8

## 2024-09-17 PROCEDURE — 2500000001 HC RX 250 WO HCPCS SELF ADMINISTERED DRUGS (ALT 637 FOR MEDICARE OP)

## 2024-09-17 PROCEDURE — 2500000004 HC RX 250 GENERAL PHARMACY W/ HCPCS (ALT 636 FOR OP/ED)

## 2024-09-17 PROCEDURE — 96372 THER/PROPH/DIAG INJ SC/IM: CPT

## 2024-09-17 RX ORDER — ACETAMINOPHEN 500 MG
1000 TABLET ORAL EVERY 8 HOURS PRN
Qty: 18 TABLET | Refills: 0 | Status: SHIPPED | OUTPATIENT
Start: 2024-09-17 | End: 2024-09-20

## 2024-09-17 RX ORDER — NAPROXEN 500 MG/1
500 TABLET ORAL EVERY 12 HOURS
Qty: 6 TABLET | Refills: 0 | Status: SHIPPED | OUTPATIENT
Start: 2024-09-17 | End: 2024-09-20

## 2024-09-17 RX ORDER — BACITRACIN ZINC 500 UNIT/G
OINTMENT IN PACKET (EA) TOPICAL ONCE
Status: COMPLETED | OUTPATIENT
Start: 2024-09-17 | End: 2024-09-17

## 2024-09-17 RX ORDER — KETOROLAC TROMETHAMINE 30 MG/ML
15 INJECTION, SOLUTION INTRAMUSCULAR; INTRAVENOUS ONCE
Status: COMPLETED | OUTPATIENT
Start: 2024-09-17 | End: 2024-09-17

## 2024-09-17 RX ORDER — BACITRACIN ZINC 500 UNIT/G
1 OINTMENT (GRAM) TOPICAL 2 TIMES DAILY
Qty: 14 G | Refills: 0 | Status: SHIPPED | OUTPATIENT
Start: 2024-09-17 | End: 2024-09-27

## 2024-09-17 NOTE — ED PROVIDER NOTES
HPI   Chief Complaint   Patient presents with    Animal Bite    Wrist Injury       34-year-old female presents the ED today with a chief concern of right hand pain.  Patient reports that symptoms started just prior to arrival.  She reports that her dogs got into an argument and she tried breaking up a fight. Reports that she got bit multiple times on her right hand and wrist.  She reports that she is concerned that she may have broken her right wrist.  Denies fevers.  Denies nausea or vomiting.  Denies numbness or tingling or weakness.  States that her dogs are fully vaccinated.  She reports that she is up-to-date on her tetanus vaccine.  Has had her tetanus vaccination last year.  She denies any head injury or loss of consciousness.  Did not sustain any other injuries.  Has no other symptoms or concern at this time.      History provided by:  Patient   used: No            Patient History   Past Medical History:   Diagnosis Date    Disease of stomach and duodenum, unspecified     Stomach problems    Encounter for gynecological examination (general) (routine) without abnormal findings 02/12/2016    Well woman exam    Headache, unspecified 12/30/2013    Headache    Other conditions influencing health status     Menstruation    Other conditions influencing health status     History of pregnancy    Presence of (intrauterine) contraceptive device     IUD contraception     No past surgical history on file.  No family history on file.  Social History     Tobacco Use    Smoking status: Some Days     Types: Cigarettes    Smokeless tobacco: Not on file   Vaping Use    Vaping status: Never Used   Substance Use Topics    Alcohol use: Not Currently    Drug use: Never       Physical Exam   ED Triage Vitals [09/16/24 2105]   Temperature Heart Rate Respirations BP   36.6 °C (97.9 °F) 72 18 110/75      Pulse Ox Temp src Heart Rate Source Patient Position   100 % -- -- --      BP Location FiO2 (%)     -- --        Physical Exam  Constitutional: Vital signs per nursing notes.  Well developed, well nourished.  No acute distress.    Eyes: PERRL; conjunctivae and lids normal  ENT: Ears normal externally; face symmetric. voice normal  Neck: neck supple, no meningismus; trachea midline without deviation  Respiratory: normal respiratory effort and excursion; no rales, rhonchi, or wheezes; equal air entry  Cardiovascular: RRR, 2+ pulses extremities   Neurological: normal speech; CN II-XII grossly intact, normal motor and sensory function  GI: no distention, soft, nontender  : Deferred  Musculoskeletal: normal gait and station; normal digits and nails; decreased range of motion of right wrist due to pain.  Full range of motion of left wrist.  Tenderness to the distal right wrist including anatomic snuffbox.  Remainder of hand unremarkable.  The right forearm and elbow are unremarkable.  2+ symmetric radial pulses.  Decreased strength in right upper extremity due to pain.  5/5 strength in left upper extremity.  Sensation intact in upper extremities bilaterally.  2+ symmetric dorsalis pedis pulses.  Compartments are soft.  No cyanosis.  Contusion noted over right shoulder and left hand however patient has full range of motion.  Full range of motion of right shoulder with no focal tenderness.  No C-spine tenderness.  There is tenderness over the right anatomic snuffbox.  No left anatomic snuffbox tenderness.  Remainder of extremities unremarkable.  Skin: Multiple abrasions and puncture wounds noted on the right wrist.  Contusions noted on right shoulder and left hand.      ED Course & MDM   ED Course as of 09/17/24 0427   Mon Sep 16, 2024   0160 Patient states her  will be able to drive her home tonight, given a dose of percocet for pain while in the emergency department [JG]   6649 IMPRESSION:  1.There is slight deformity to the lateral aspect of the distal  right radial metaphysis. I suspect this is a puncture wound which  has  penetrated the cortex of the distal radius. No significant  displacement. Bony structures of the right wrist otherwise appear  intact. Prominent gas within the soft tissues on the right related to  penetrating injury. No radiopaque foreign body.  2.Unremarkable left wrist.  Signed by Eitan Lorenzo MD   []   Tue Sep 17, 2024   0013 Spoke with ortho dr mercado who recommends bacitracin Band-Aids and following up with orthopedics []   0037 Denies chance of pregnancy []      ED Course User Index  [JG] Batsheva Bear MD  [] Oscar Wilson PA-C         Diagnoses as of 09/17/24 0427   Dog bite of right forearm, initial encounter   Open fracture of right wrist, initial encounter                 No data recorded     Kirill Coma Scale Score: 15 (09/16/24 2253 : Vicki Caruso LPN)                           Medical Decision Making  34-year-old female presents the ED today with a chief concern of right hand pain.  Vital signs reassuring.  Patient overall appears well and is nontoxic-appearing.  Was given Augmentin, Percocet, bacitracin, and Toradol in the ED. Denies any chance of pregnancy.  Has a ride home today will not be driving her self home.  Patient has full range of motion of the neck without any meningismus.  Satting on room air.  Not hypoxic.  Not tachycardic.  Afebrile.  Neurovascular status intact in upper extremities bilaterally. Patient's dog is vaccinated.  She can observe the dog as well.  No indication for rabies shot at this time.  Her tetanus is up-to-date.  The wound was cleaned with sterile water and tap water in the ED.  Does have multiple puncture wounds noted on the right upper extremity.  There is slight deformity to the lateral aspect of the distal radial metaphysis.  Radiology suspects this is a puncture wound which has penetrated the cortex the distal radius.  Unremarkable left wrist.  Does have tenderness over right anatomic snuffbox.  No left anatomic snuffbox tenderness.   Discussed with orthopedics regarding this patient as noted above.  Spoke with Dr. Gunn.  X-ray of the right shoulder is unremarkable.  X-ray of right forearm shows deep soft tissue emphysema and swelling along the ventral aspect of the distal forearm up to the wrist.  This is consistent with patient's dog bite.  Again wound thoroughly cleaned.  Bacitracin placed over the wounds.  Band-Aids placed over the wounds as well.  Given the fracture and snuffbox tenderness on the right she was placed in a thumb spica custom molded splint in the ED by [facility].  I evaluated this replacement neurovascular status intact.  Without was given sling.  Is freely moving the remainder of extremities without any difficulty.  Did not sustain any other injuries.  Will treat outpatient with bacitracin, Augmentin, Tylenol, and naproxen.  Discussed antibiotic choice with pharmacy.  Again neurovascular status intact in upper extremities bilaterally.  Given this was a dog bite no closure was indicated at this time.  Discussed with patient the importance of following with orthopedics in 3 days for evaluation given that she is really unable to evaluate the status of the wound for worsening signs of infection.  Discussed my impression and findings with patient she feels comfortable returning home.  We discussed very strict return precautions including returning for any new or worsening signs or symptoms.  Patient is in agreement this plan.  She will follow-up with her PCP and orthopedics within 3 days.  Again discussed return precautions.  Photos uploaded of patient's right hand and wrist in chart.  We discussed signs and symptoms of infection.  Again discussed importance of following up with orthopedics within 3 days.  Patient has no systemic signs or symptoms at this time.    Differential diagnosis: Fracture, strain, sprain, cellulitis, compartment syndrome    Disposition/treatment  1.  See above    Shared decision-making was used patient  feels comfortable returning home     Patient was advised to follow up with recommended provider in 1 day1 for another evaluation and exam. I advised patient/guardian to return or go to closest emergency room immediately if symptoms change, get worse, new symptoms develop prior to follow up. If there is no improvement in symptoms in the next 24 hours they are advised to return for further evaluation and exam. I also explained the plan and treatment course. Patient/guardian is in agreement with plan, treatment course, and follow up and states verbally that they will comply.    Homegoing. I discussed the differential; results and discharge plan with the patient and/or family/friend/caregiver if present.  I emphasized the importance of follow-up with the physician I referred them to in the timeframe recommended.  I explained reasons for the patient to return to the Emergency Department. They agreed that if they feel their condition is worsening or if they have any other concern they should call 911 immediately for further assistance. I gave the patient an opportunity to ask all questions they had and answered all of them accordingly. They understand return precautions and discharge instructions. The patient and/or family/friend/caregiver expressed understanding verbally and that they would comply.        This note has been transcribed using voice recognition and may contain grammatical errors, misplaced words, incorrect words, incorrect phrases or other errors.        Procedure  Procedures     Oscar Wilson PA-C  09/17/24 0437       Oscar Wilson PA-C  09/17/24 0438       Oscar Wilson PA-C  09/17/24 0439

## 2024-09-17 NOTE — ED TRIAGE NOTES
"   TRIAGE NOTE   I saw the patient as the Clinician in Triage and performed a brief history and physical exam, established acuity, and ordered appropriate tests to develop basic plan of care. Patient will be seen by an BANDAR, resident and/or physician who will independently evaluate the patient. Please see subsequent provider notes for further details and disposition.     Brief HPI: In brief, William Abrams \"Aleksander\" is a 34 y.o. female that presents for multiple dog bites. She states her two dogs began fighting earlier today and when she went to break up the fight, she sustained several bites to her right wrist and forearm, as well as a bite to her right shoulder and left wrist. She states her tetanus vaccine was updated last year after she had a dog bite at that time. The dogs are hers, and she states they are fully vaccinated. She is reporting pain in her right wrist with limited range of motion and is concerned for fracture. She denies any prior surgeries to her right upper extremity, other injuries or trauma today. She denies any known allergies to antibiotics.    Focused Physical exam:   A&Ox4, vitals stable. Multiple puncture wounds noted to right forearm, media placed in chart. Venous oozing noted from several sites. Radial pulses intact. Decreased ROM secondary to pain in right wrist. Abrasions noted to right shoulder and left wrist without skin breakage.      Plan/MDM:   XR right wrist and forearm, right shoulder, and left wrist to rule out retained foreign body or fracture  Augmentin for dog bite  Reports updated tetanus last year    Please see subsequent provider note for further details and disposition     "

## 2024-09-17 NOTE — DISCHARGE INSTRUCTIONS
Please return to the ED immediately if you have any new or worsening signs or symptoms  Please follow-up with your primary care doctor and orthopedics within 3 days

## 2024-09-17 NOTE — ED TRIAGE NOTES
Pt to ED with complaint of dog bite while trying to break up a fight between her dogs around 6pm. Pt is noted to have puncture wounds on right wrist and forearm. Pt is complaining of pain in this area. Pt is concerned for possible fracture. Pt is up to date with tetanus shot that she has gotten this year. Wounds are bleeding. Wounds wrapped with new dressing and kerlex in triage.

## 2024-09-18 ENCOUNTER — OFFICE VISIT (OUTPATIENT)
Dept: ORTHOPEDIC SURGERY | Facility: HOSPITAL | Age: 34
End: 2024-09-18
Payer: COMMERCIAL

## 2024-09-18 ENCOUNTER — HOSPITAL ENCOUNTER (OUTPATIENT)
Dept: RADIOLOGY | Facility: CLINIC | Age: 34
Discharge: HOME | End: 2024-09-18
Payer: COMMERCIAL

## 2024-09-18 ENCOUNTER — OFFICE VISIT (OUTPATIENT)
Dept: ORTHOPEDIC SURGERY | Facility: CLINIC | Age: 34
End: 2024-09-18
Payer: COMMERCIAL

## 2024-09-18 ENCOUNTER — HOSPITAL ENCOUNTER (OUTPATIENT)
Dept: RADIOLOGY | Facility: CLINIC | Age: 34
End: 2024-09-18
Payer: COMMERCIAL

## 2024-09-18 VITALS — HEIGHT: 63 IN | WEIGHT: 115 LBS | BODY MASS INDEX: 20.38 KG/M2

## 2024-09-18 DIAGNOSIS — S82.201S FRACTURE OF TIBIA WITH FIBULA, RIGHT, OPEN, SEQUELA: ICD-10-CM

## 2024-09-18 DIAGNOSIS — S82.201S FRACTURE OF TIBIA WITH FIBULA, RIGHT, OPEN, SEQUELA: Primary | ICD-10-CM

## 2024-09-18 DIAGNOSIS — S82.401S FRACTURE OF TIBIA WITH FIBULA, RIGHT, OPEN, SEQUELA: Primary | ICD-10-CM

## 2024-09-18 DIAGNOSIS — S51.851A DOG BITE OF RIGHT FOREARM, INITIAL ENCOUNTER: ICD-10-CM

## 2024-09-18 DIAGNOSIS — S82.401S FRACTURE OF TIBIA WITH FIBULA, RIGHT, OPEN, SEQUELA: ICD-10-CM
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
## 2024-09-18 PROCEDURE — 73590 X-RAY EXAM OF LOWER LEG: CPT | Mod: RT

## 2024-09-18 PROCEDURE — 73610 X-RAY EXAM OF ANKLE: CPT | Mod: RIGHT SIDE | Performed by: RADIOLOGY

## 2024-09-18 PROCEDURE — 73590 X-RAY EXAM OF LOWER LEG: CPT | Mod: RIGHT SIDE | Performed by: RADIOLOGY

## 2024-09-18 PROCEDURE — 73610 X-RAY EXAM OF ANKLE: CPT | Mod: RT

## 2024-09-18 PROCEDURE — 99214 OFFICE O/P EST MOD 30 MIN: CPT | Performed by: PHYSICIAN ASSISTANT

## 2024-09-20 NOTE — PROGRESS NOTES
History of Present Illness   William Abrams is a 33 yo F known to my practice for hx of R tib/fib fx treated with IMN by Dr. Guerra, presenting for complaints of RUE trauma. On 9/16/24 she broke up a dog fight and sustained multiple bites to the right hand and wrist.  She was seen in the ED shortly after and diagnosed with a distal radius fracture and placed in a short arm splint.  She has had moderate pain that has improved with the immobilization though.  While here, she noted that she continues to have significant pain in the right ankle.  Is being seen by pain management in the I had a trial of a spine injection which actually caused her increased pain.  She has not had a ketamine infusion yet but continues to be recommended by pain management for this.  No other complaints or concerns.        Past Medical History:   Diagnosis Date    Disease of stomach and duodenum, unspecified     Stomach problems    Encounter for gynecological examination (general) (routine) without abnormal findings 02/12/2016    Well woman exam    Headache, unspecified 12/30/2013    Headache    Other conditions influencing health status     Menstruation    Other conditions influencing health status     History of pregnancy    Presence of (intrauterine) contraceptive device     IUD contraception       Medication Documentation Review Audit       Reviewed by Amari Mandujano on 09/18/24 at 1027      Medication Order Taking? Sig Documenting Provider Last Dose Status   acetaminophen (Tylenol) 500 mg tablet [id number]  Take 2 tablets (1,000 mg) by mouth every 8 hours if needed for mild pain (1 - 3) for up to 3 days. Oscar Wilson PA-C  Active   amitriptyline (Elavil) 10 mg tablet [id number] No Take 1 tablet (10 mg) by mouth once daily at bedtime. Felipe Centeno MD PhD Taking Active   amoxicillin-pot clavulanate (Augmentin) 875-125 mg tablet [id number]  Take 1 tablet by mouth every 12 hours for 7 days. Batsheva Bear MD  Active   bacitracin  500 unit/gram ointment [id number]  Apply 1 Application topically 2 times a day for 10 days. Oscar Wilson PA-C  Active   gabapentin (Neurontin) 800 mg tablet [id number] No Take 1 tablet (800 mg) by mouth 3 times a day. Kimberly Escobar PA-C Taking Active   levonorgestreL (Liletta) 20.4 mcg/24 hrs (8 yrs) 52 mg intrauterine device [id number] No 52 mg by intrauterine route once daily. Historical Provider, MD Taking Active   loratadine (Claritin) 10 mg tablet [id number] No Take 1 tablet (10 mg) by mouth once daily as needed. Historical Provider, MD Taking Differently Active   naproxen (Naprosyn) 500 mg tablet [id number]  Take 1 tablet (500 mg) by mouth every 12 hours for 3 days. Oscar Wilson PA-C  Active   NON FORMULARY [id number] No Take 1 each by mouth once daily. Tumeric suppliment Historical Provider, MD Taking Active   pantoprazole (ProtoNix) 40 mg EC tablet [id number] No Take 1 tablet by mouth once daily. Historical Provider, MD Taking  01/10/24 2359   predniSONE (Deltasone) 20 mg tablet [id number]  Take 1 tablet (20 mg) by mouth once daily. Follow faxed titration schedule Felipe Centeno MD PhD  Active   sucralfate (Carafate) 100 mg/mL suspension [id number]  Take 10 mL (1 g) by mouth 4 times a day with meals. Take 1 hour before meals and at bedtime Candice Porter, APRN-CNP  Active   topiramate (Topamax) 100 mg tablet [id number]  Take 1 tablet (100 mg) by mouth 2 times a day. Felipe Centeno MD PhD  Active   topiramate (Topamax) 25 mg tablet [id number] No Take 1 tablet (25 mg) by mouth once daily. Take 1 tab at bedtime x7 days --> 1 BID x7 days--> 1 qAM 2 qHS x7 days--> 2 BID x7 days-->2 qAM, 3 qHS x7 days--> 3 BID x7 days --> 3 qAM, 4 at bedtime x7 days -->4 BID & call the office for the 100 mg strength Felipe Centeno MD PhD Taking Active                    Allergies   Allergen Reactions    Citrus And Derivatives Angioedema    Fish Derived Hives and Angioedema    Peanut Hives and Angioedema    Shellfish Derived Hives  and Angioedema    Lactose Constipation    Latex Unknown       Social History     Socioeconomic History    Marital status:      Spouse name: Not on file    Number of children: Not on file    Years of education: Not on file    Highest education level: Not on file   Occupational History    Not on file   Tobacco Use    Smoking status: Some Days     Types: Cigarettes    Smokeless tobacco: Not on file   Vaping Use    Vaping status: Never Used   Substance and Sexual Activity    Alcohol use: Not Currently    Drug use: Never    Sexual activity: Not on file   Other Topics Concern    Not on file   Social History Narrative    Not on file     Social Determinants of Health     Financial Resource Strain: Low Risk  (11/19/2023)    Overall Financial Resource Strain (CARDIA)     Difficulty of Paying Living Expenses: Not hard at all   Food Insecurity: No Food Insecurity (11/19/2023)    Hunger Vital Sign     Worried About Running Out of Food in the Last Year: Never true     Ran Out of Food in the Last Year: Never true   Transportation Needs: No Transportation Needs (1/26/2024)    OASIS : Transportation     Lack of Transportation (Medical): No     Lack of Transportation (Non-Medical): No     Patient Unable or Declines to Respond: No   Physical Activity: Insufficiently Active (11/19/2023)    Exercise Vital Sign     Days of Exercise per Week: 4 days     Minutes of Exercise per Session: 30 min   Stress: No Stress Concern Present (11/19/2023)    [employer] - Occupational Stress Questionnaire     Feeling of Stress : Not at all   Social Connections: Feeling Socially Integrated (1/26/2024)    OASIS : Social Isolation     Frequency of experiencing loneliness or isolation: Never   Recent Concern: Social Connections - Socially Isolated (11/19/2023)    Social Connection and Isolation Panel [NHANES]     Frequency of Communication with Friends and Family: Once a week     Frequency of Social Gatherings  with Friends and Family: Once a week     Attends Orthodoxy Services: Never     Active Member of Clubs or Organizations: No     Attends Club or Organization Meetings: Never     Marital Status:    Intimate Partner Violence: Not At Risk (11/19/2023)    Humiliation, Afraid, Rape, and Kick questionnaire     Fear of Current or Ex-Partner: No     Emotionally Abused: No     Physically Abused: No     Sexually Abused: No   Housing Stability: Low Risk  (11/19/2023)    Housing Stability Vital Sign     Unable to Pay for Housing in the Last Year: No     Number of Places Lived in the Last Year: 2     Unstable Housing in the Last Year: No       History reviewed. No pertinent surgical history.      Review of Systems   30 point ROS reviewed and negative other than as listed in the HPI.      Physical Exam:  Gen: The pt is A&Ox3, NAD, and appear state age and weight  Psychiatric: mood and affect are appropriate   Eyes: sclera are white, EOM grossly intact  ENT: MMM  Neck: supple, thyroid is midline  Respiratory: respirations are nonlabored, chest rise symmetric  CV: rate is regular by palpation of distal pulses  Abdomen: nondistended   Integument: no obvious cutaneous lesions noted. No signs of lymphangitis. No signs of systemic edema. ,  MSK:    side: right upper extremity:  There are multiple small puncture wounds throughout the wrist.  They appear to be well-healing without redness or drainage or other signs or symptoms of infection.  She has moderate tenderness to palpation throughout the wrist.  Able to make a fist.  Opposition is intact.  Sensation throughout the hand is intact.  Hand is warm and well-perfused with good cap refill.     Her right lower extremity shows multiple well-healed surgical incisions.  She has pain out of proportion to exam even with the slightest touch of her foot and ankle.  She has full range of motion of her ankle.  Foot is warm and well-perfused.      Imaging  I personally reviewed multiple  views of the right wrist taken in the ER on 9/16/2024 and notable for a small puncture wound to the distal radius but otherwise no intra-articular fracture or extension of the puncture wound to the bone.  Multiple views of her right tib-fib and ankle were also obtained in the office today and demonstrate a well-healed fracture of both the tibia and fibula     Assessment   S/p dog bite with small puncture wound seen on radiographs of the right distal radius  Well-healed right tib-fib fracture complicated by CRPS     Plan:  I reviewed her imaging of her wrist in detail with her today.  There is a small puncture wound in the bone of the distal radius but no extension concerning for significant fracture.  Her wounds are all healthy without signs or symptoms of infection.  She is currently on p.o. antibiotics prescribed by the ED.  She will continue to take these as prescribed until gone.  I removed her splint in the office today and put her in a removable Velcro brace to wear as needed for comfort.  Encouraged her to get her wrist moving start using it as soon as possible to avoid pain issues in this area as well.  She can wash her wounds with soap and water but no lotions creams or tub soaks.    It went over her imaging of her leg with her in detail as well.  Her injury is healed.  We did discuss that unfortunately CRPS is a complication of injuries like this and that she should continue seeing pain management.  She had several questions regarding the ketamine infusions that I will defer to her pain management doctor.  She has an appointment next week that she was encouraged to keep.    She can follow-up with us as previously scheduled.    Natural history reviewed. All of the pt questions/concerns addressed and they are in agreement with the plan.

## 2024-09-23 ENCOUNTER — CLINICAL SUPPORT (OUTPATIENT)
Dept: PHYSICAL THERAPY | Facility: CLINIC | Age: 34
End: 2024-09-23
Payer: COMMERCIAL

## 2024-09-23 DIAGNOSIS — G90.519 COMPLEX REGIONAL PAIN SYNDROME TYPE 1 OF UPPER EXTREMITY, UNSPECIFIED LATERALITY: ICD-10-CM

## 2024-09-23 PROCEDURE — 97162 PT EVAL MOD COMPLEX 30 MIN: CPT | Mod: GP

## 2024-09-23 PROCEDURE — 97110 THERAPEUTIC EXERCISES: CPT | Mod: GP

## 2024-09-23 ASSESSMENT — PAIN - FUNCTIONAL ASSESSMENT: PAIN_FUNCTIONAL_ASSESSMENT: 0-10

## 2024-09-23 ASSESSMENT — ENCOUNTER SYMPTOMS
LOSS OF SENSATION IN FEET: 0
DEPRESSION: 0
OCCASIONAL FEELINGS OF UNSTEADINESS: 0

## 2024-09-23 ASSESSMENT — PAIN SCALES - GENERAL: PAINLEVEL_OUTOF10: 8

## 2024-09-23 NOTE — PROGRESS NOTES
"Physical Therapy Evaluation and Treatment      Patient Name: William Arbams \"Aleksander\"  MRN: [id number]  Today's Date: 9/23/2024  Time Calculation  Start Time: 1515  Stop Time: 1600  Time Calculation (min): 45 min  PT Therapeutic Procedures Time Entry  Therapeutic Exercise Time Entry: 5  Therapeutic Activity Time Entry: 5    Moderate complexity due to patient's clinical presentation being evolving with changing characteristics, with comorbidities/complexities to include right tib/fib Fx, left fibular fx, all of which may negatively impact rehab tolerance and progression.    Insurance:  Visit number: 1 of 6  Authorization info: [employer]-  EVAL ONLY/ PATIENT ONLY HAS 6 VISITS REMAINING ON THE CALENDAR   Insurance Type: Payor: [employer] / Plan: [employer] / Product Type: *No Product type* /     Current Problem:   1. Complex regional pain syndrome type 1 of upper extremity, unspecified laterality  OT eval and treat    Follow Up In Physical Therapy          Subjective    35 y/o female referred to PT by Dr. Centeno for treatment of CRPS.    Pt fractured right tibia and fibula 11/19/2023 when she fell going up stairs.  Had IM nail placed right tibia followed by 6 weeks of home PT and 6 weeks of out patient PT.  Pt reports she was still having significant pain; sent to pain management and had nerve block 8/2/2024.  Pt had allergic reaction to nerve block which made pain worse.  Before nerve block, pt was able to wear a shoe; since block she has had to return to using boot.     Pt also fractured left fibula 7/109/2024; did not require surgery and has completed PT for this.      Pt reports constant tingling  and burning in right LE \"from my toes to my hip\".  Right leg always feels like it's asleep and has shooting pains. Pt reports she can only stand for short periods. Taking a shower is difficult; unable to tolerate water on leg.  Also has difficulty sleeping at night due to pain. Pt also reports recent onset of right pain in " achilles tendon.     Pt's goal is to be able to negotiate stairs normally    Pt reports she does not work outside of home.  Lives with  and 12 y/o daughter in 2nd floor apartment.     General  Reason for Referral: CRPS  Referred By: Dr. Centeno  Past Medical History Relevant to Rehab: right tib-fib fx,    Precautions  STEADI Fall Risk Score (The score of 4 or more indicates an increased risk of falling): 2  Medical Precautions: No known precautions/limitation    Pain Assessment  Pain Assessment: 0-10  0-10 (Numeric) Pain Score: 8  Pain Location: Leg  Pain Orientation: Right  Pain Frequency: Constant/continuous    Objective   ROM    Right ankle:        Df  8deg      Pf  45deg      Inverion  25deg      Eversion  5deg    MMT      Right ankle       Df  5/5       Pf   5/5       Inversion 4/5       Eversion not tested.  Pt's leg began to shake and pt c/o increased pain.    Palpation     Pt able to tolerate light touch right lower leg.  Observed pt touching right leg without any facial grimacing. Pt would also on occasion sit on right leg without any signs of pain.     Transfers    independent  Gait    Pt ambulated from waiting room to treatment room with boot donned on right foot; normal pace, non-antalgic gait pattern.  After MMT, pt ambulated without boot, but with antalgic gait, decreased right heel strike.     Stairs    Not tested. Pt reports difficulty with stairs    Treatments:  Therapeutic Exercise:   Review of current HEP.  Instructed and performed gastroc stretch with towel; handout provided.     Therapeutic activity:   Education:  clinical findings and POC    Assessment   PT Assessment Results: Decreased strength, Decreased range of motion, Decreased mobility, Impaired sensation, Pain  Rehab Prognosis: Fair  Evaluation/Treatment Tolerance: Patient limited by pain  Assessment Comment: Pt is a 33 y/o female with diagnosis of CRPS resulting from fracture of right tibia and fibula  11/19/2023.   Observations  during eval sometimes inconsistent with CRPS diagnosis  but pt does present with ROM and strength deficits that impact mobility.  Pt would benefit from skilled PT to address deficits and maximize functional mobility  Moderate complexity due to patient's clinical presentation being evolving with changing characteristics, with comorbidities/complexities to include right tib/fib Fx, left fibular fx, all of which may negatively impact rehab tolerance and progression.     Plan:   Treatment/Interventions: Cryotherapy, Dry needling, Education/ Instruction, Electrical stimulation, Gait training, Hot pack, Manual therapy, Neuromuscular re-education, Taping techniques, Therapeutic activities, Therapeutic exercises, Ultrasound  PT Plan: Skilled PT  PT Frequency: 1 time per week  Duration: 6 more visits  Onset Date: 09/23/24  Number of Treatments Authorized: Pending approval. [employer]-  EVAL ONLY/ PATIENT ONLY HAS 6 VISITS REMAINING ON THE CALENDAR  Rehab Potential: Fair  Plan of Care Agreement: Patient  POC will be transferred to Rebekah Candelario PT      Goals:   Active       PT Problem       Pt will be compliant with HEP       Start:  09/23/24    Expected End:  10/07/24            Increase strength right ankle eversion to 5/5 for normal gait mechanics       Start:  09/23/24    Expected End:  11/22/24            Pt able to ambulate without boot household distances with <4/10 pain       Start:  09/23/24    Expected End:  11/22/24            Pt able to ascent stair with reciprocal pattern with one rail without difficulty       Start:  09/23/24    Expected End:  11/22/24

## 2024-10-01 ENCOUNTER — OFFICE VISIT (OUTPATIENT)
Dept: ORTHOPEDIC SURGERY | Facility: CLINIC | Age: 34
End: 2024-10-01
Payer: COMMERCIAL

## 2024-10-01 DIAGNOSIS — M79.641 RIGHT HAND PAIN: ICD-10-CM

## 2024-10-01 PROCEDURE — 99213 OFFICE O/P EST LOW 20 MIN: CPT | Performed by: PHYSICIAN ASSISTANT

## 2024-10-03 NOTE — PROGRESS NOTES
History of Present Illness   William Abrams is a 33 yo F presents to the office for follow up from dog bite to the R wrist sustained 9/16/24. Overall her pain has improved but still having moderate pain with increase when trying to flex/extend the wrist. Also having increased pain with trying to twist or . Sx improved with rest and wearing wrist brace. Wounds from bites all well healed. No other complaints or concerns at this time.      Past Medical History:   Diagnosis Date    Disease of stomach and duodenum, unspecified     Stomach problems    Encounter for gynecological examination (general) (routine) without abnormal findings 02/12/2016    Well woman exam    Headache, unspecified 12/30/2013    Headache    Other conditions influencing health status     Menstruation    Other conditions influencing health status     History of pregnancy    Presence of (intrauterine) contraceptive device     IUD contraception       Medication Documentation Review Audit       Reviewed by Kimberly Escobar PA-C (Physician Assistant) on 09/20/24 at 1033      Medication Order Taking? Sig Documenting Provider Last Dose Status   acetaminophen (Tylenol) 500 mg tablet [id number]  Take 2 tablets (1,000 mg) by mouth every 8 hours if needed for mild pain (1 - 3) for up to 3 days. Oscar Wilson PA-C  Active   amitriptyline (Elavil) 10 mg tablet [id number] No Take 1 tablet (10 mg) by mouth once daily at bedtime. Felipe Centeno MD PhD Taking Active   amoxicillin-pot clavulanate (Augmentin) 875-125 mg tablet [id number]  Take 1 tablet by mouth every 12 hours for 7 days. Batsheva Bear MD  Active   bacitracin 500 unit/gram ointment [id number]  Apply 1 Application topically 2 times a day for 10 days. Oscar Wilson PA-C  Active   gabapentin (Neurontin) 800 mg tablet [id number] No Take 1 tablet (800 mg) by mouth 3 times a day. Kimberly Escobar PA-C Taking Active   levonorgestreL (Liletta) 20.4 mcg/24 hrs (8 yrs) 52 mg intrauterine device [id number]  No 52 mg by intrauterine route once daily. Historical Provider, MD Taking Active   loratadine (Claritin) 10 mg tablet [id number] No Take 1 tablet (10 mg) by mouth once daily as needed. Historical Provider, MD Taking Differently Active   naproxen (Naprosyn) 500 mg tablet [id number]  Take 1 tablet (500 mg) by mouth every 12 hours for 3 days. Oscar Wilson PA-C  Active   NON FORMULARY [id number] No Take 1 each by mouth once daily. Tumeric suppliment Historical Provider, MD Taking Active   pantoprazole (ProtoNix) 40 mg EC tablet [id number] No Take 1 tablet by mouth once daily. Historical Provider, MD Taking  01/10/24 2359   predniSONE (Deltasone) 20 mg tablet [id number]  Take 1 tablet (20 mg) by mouth once daily. Follow faxed titration schedule Felipe Centeno MD PhD  Active   sucralfate (Carafate) 100 mg/mL suspension [id number]  Take 10 mL (1 g) by mouth 4 times a day with meals. Take 1 hour before meals and at bedtime SOBIA Mcconnell-CNP  Active   topiramate (Topamax) 100 mg tablet [id number]  Take 1 tablet (100 mg) by mouth 2 times a day. Felipe Centeno MD PhD  Active   topiramate (Topamax) 25 mg tablet [id number] No Take 1 tablet (25 mg) by mouth once daily. Take 1 tab at bedtime x7 days --> 1 BID x7 days--> 1 qAM 2 qHS x7 days--> 2 BID x7 days-->2 qAM, 3 qHS x7 days--> 3 BID x7 days --> 3 qAM, 4 at bedtime x7 days -->4 BID & call the office for the 100 mg strength Felipe Centeno MD PhD Taking Active                    Allergies   Allergen Reactions    Citrus And Derivatives Angioedema    Fish Derived Hives and Angioedema    Peanut Hives and Angioedema    Shellfish Derived Hives and Angioedema    Lactose Constipation    Latex Unknown       Social History     Socioeconomic History    Marital status:      Spouse name: Not on file    Number of children: Not on file    Years of education: Not on file    Highest education level: Not on file   Occupational History    Not on file   Tobacco Use    Smoking status:  Some Days     Types: Cigarettes    Smokeless tobacco: Not on file   Vaping Use    Vaping status: Never Used   Substance and Sexual Activity    Alcohol use: Not Currently    Drug use: Never    Sexual activity: Not on file   Other Topics Concern    Not on file   Social History Narrative    Not on file     Social Determinants of Health     Financial Resource Strain: Low Risk  (11/19/2023)    Overall Financial Resource Strain (CARDIA)     Difficulty of Paying Living Expenses: Not hard at all   Food Insecurity: No Food Insecurity (11/19/2023)    Hunger Vital Sign     Worried About Running Out of Food in the Last Year: Never true     Ran Out of Food in the Last Year: Never true   Transportation Needs: No Transportation Needs (1/26/2024)    OASIS : Transportation     Lack of Transportation (Medical): No     Lack of Transportation (Non-Medical): No     Patient Unable or Declines to Respond: No   Physical Activity: Insufficiently Active (11/19/2023)    Exercise Vital Sign     Days of Exercise per Week: 4 days     Minutes of Exercise per Session: 30 min   Stress: No Stress Concern Present (11/19/2023)    [employer] [employer] - Occupational Stress Questionnaire     Feeling of Stress : Not at all   Social Connections: Feeling Socially Integrated (1/26/2024)    OASIS : Social Isolation     Frequency of experiencing loneliness or isolation: Never   Recent Concern: Social Connections - Socially Isolated (11/19/2023)    Social Connection and Isolation Panel [NHANES]     Frequency of Communication with Friends and Family: Once a week     Frequency of Social Gatherings with Friends and Family: Once a week     Attends Evangelical Services: Never     Active Member of Clubs or Organizations: No     Attends Club or Organization Meetings: Never     Marital Status:    Intimate Partner Violence: Not At Risk (11/19/2023)    Humiliation, Afraid, Rape, and Kick questionnaire     Fear of Current or Ex-Partner:  No     Emotionally Abused: No     Physically Abused: No     Sexually Abused: No   Housing Stability: Low Risk  (11/19/2023)    Housing Stability Vital Sign     Unable to Pay for Housing in the Last Year: No     Number of Places Lived in the Last Year: 2     Unstable Housing in the Last Year: No       No past surgical history on file.      Review of Systems   30 point ROS reviewed and negative other than as listed in the HPI.      Physical Exam:  Gen: The pt is A&Ox3, NAD, and appear state age and weight  Psychiatric: mood and affect are appropriate   Eyes: sclera are white, EOM grossly intact  ENT: MMM  Neck: supple, thyroid is midline  Respiratory: respirations are nonlabored, chest rise symmetric  CV: rate is regular by palpation of distal pulses  Abdomen: nondistended   Integument: no obvious cutaneous lesions noted. No signs of lymphangitis. No signs of systemic edema. ,  MSK:    side: right upper extremity:  There are multiple small well healed wounds throughout the wrist.   She has moderate tenderness to palpation throughout the wrist. ROM is intact but limited secondary to pain. Able to make a fist.  Opposition is intact.  Sensation throughout the hand is intact.  Hand is warm and well-perfused with good cap refill.     Imaging  No new imaging     Assessment   S/p dog bite with small puncture wound seen on radiographs of the right distal radius     Plan:  Her wounds are all well healed. Her ROM is intact but pain at the ends of motion. We discussed role of therapy for complete recovery and a referral to OT was placed for hand therapy. She has no restrictions. Can wear brace as needed.     Follow up as previously scheduled.    Natural history reviewed. All of the pt questions/concerns addressed and they are in agreement with the plan.

## 2024-10-07 ENCOUNTER — DOCUMENTATION (OUTPATIENT)
Dept: PHYSICAL THERAPY | Facility: CLINIC | Age: 34
End: 2024-10-07
Payer: COMMERCIAL

## 2024-10-07 ENCOUNTER — APPOINTMENT (OUTPATIENT)
Dept: PHYSICAL THERAPY | Facility: CLINIC | Age: 34
End: 2024-10-07
Payer: COMMERCIAL

## 2024-10-07 NOTE — PROGRESS NOTES
"Physical Therapy                 Therapy Communication Note    Patient Name: William Abrams \"Aleksander\"  MRN: [id number]  Department:   Room: Room/bed info not found  Today's Date: 10/7/2024     Discipline: Physical Therapy    Missed Visit Reason:     Missed Time: Cancel    Comment:  "

## 2024-10-14 ENCOUNTER — TREATMENT (OUTPATIENT)
Dept: PHYSICAL THERAPY | Facility: CLINIC | Age: 34
End: 2024-10-14
Payer: COMMERCIAL

## 2024-10-14 DIAGNOSIS — G90.519 COMPLEX REGIONAL PAIN SYNDROME TYPE 1 OF UPPER EXTREMITY, UNSPECIFIED LATERALITY: ICD-10-CM

## 2024-10-14 PROCEDURE — 97110 THERAPEUTIC EXERCISES: CPT | Mod: GP | Performed by: PHYSICAL THERAPIST

## 2024-10-14 NOTE — PROGRESS NOTES
"Physical Therapy Treatment    Patient Name: William Abrams  MRN: [id number]  Today's Date: 10/14/2024  Time Calculation  Start Time: 1025  Stop Time: 1103  Time Calculation (min): 38 min  PT Therapeutic Procedures Time Entry  Therapeutic Exercise Time Entry: 40    Insurance:  Visit number: 2 of 6  Authorization info: [employer]-  EVAL ONLY/ PATIENT ONLY HAS 6 VISITS REMAINING ON THE CALENDAR   Insurance Type: Payor: [employer] / Plan: [employer] / Product Type: *No Product type* /    Current Problem   1. Complex regional pain syndrome type 1 of upper extremity, unspecified laterality  Follow Up In Physical Therapy          Subjective   General   Patient reports her right leg has been causing some constant pain and stiffness. Has found the weather changes to be very impactful and increasing pain as well. Having a lot of sensations in right lower leg/foot/ankle. She is looking into getting a \"capability test.\"       Precautions: Falls  Pain 7/10  Post Treatment Pain Level 6/10    Objective   High level of sensitivity to any palpation of right LE, increased distally    Treatments:  Therapeutic Exercise:  NuStep L5 x7 minutes  Gastroc stretch with towel, 30 seconds x 3  Ankle pumps A/P 3x10  Ankle inversion/eversion 3x10   LAQ 3x10      Assessment   Assessment:   Fair level of tolerance to there ex progressions, limited by fatigue and pain. Increased time required for all activities and exercises in clinic with extended rest breaks.         Plan: gentle AROM, flexibility, isometrics       OP EDUCATION: cont with gentle mobility       Goals:   Active       PT Problem       Pt will be compliant with HEP       Start:  09/23/24    Expected End:  10/07/24            Increase strength right ankle eversion to 5/5 for normal gait mechanics       Start:  09/23/24    Expected End:  11/22/24            Pt able to ambulate without boot household distances with <4/10 pain       Start:  09/23/24    Expected End:  11/22/24            Pt " able to ascent stair with reciprocal pattern with one rail without difficulty       Start:  09/23/24    Expected End:  11/22/24

## 2024-10-15 DIAGNOSIS — G57.71 COMPLEX REGIONAL PAIN SYNDROME TYPE 2 OF RIGHT LOWER EXTREMITY: ICD-10-CM

## 2024-10-16 RX ORDER — PREDNISONE 20 MG/1
20 TABLET ORAL DAILY
Qty: 63 TABLET | Refills: 0 | Status: SHIPPED | OUTPATIENT
Start: 2024-10-16 | End: 2024-12-18

## 2024-10-21 ENCOUNTER — DOCUMENTATION (OUTPATIENT)
Dept: PHYSICAL THERAPY | Facility: CLINIC | Age: 34
End: 2024-10-21
Payer: COMMERCIAL

## 2024-10-21 NOTE — PROGRESS NOTES
"Physical Therapy                 Therapy Communication Note    Patient Name: William Abrams \"Aleksander\"  MRN: [id number]  Department:   Room: Room/bed info not found  Today's Date: 10/21/2024     Discipline: Physical Therapy    Missed Visit Reason:      Missed Time: No Show    Comment:  "

## 2024-10-28 ENCOUNTER — APPOINTMENT (OUTPATIENT)
Dept: PHYSICAL THERAPY | Facility: CLINIC | Age: 34
End: 2024-10-28
Payer: COMMERCIAL

## 2024-10-31 ENCOUNTER — APPOINTMENT (OUTPATIENT)
Dept: PAIN MEDICINE | Facility: CLINIC | Age: 34
End: 2024-10-31
Payer: COMMERCIAL

## 2024-10-31 DIAGNOSIS — G90.521 COMPLEX REGIONAL PAIN SYNDROME TYPE 1 OF RIGHT LOWER EXTREMITY: Primary | ICD-10-CM

## 2024-11-01 ENCOUNTER — APPOINTMENT (OUTPATIENT)
Dept: ORTHOPEDIC SURGERY | Facility: HOSPITAL | Age: 34
End: 2024-11-01
Payer: COMMERCIAL

## 2024-11-04 ENCOUNTER — APPOINTMENT (OUTPATIENT)
Dept: PHYSICAL THERAPY | Facility: CLINIC | Age: 34
End: 2024-11-04
Payer: COMMERCIAL

## 2024-11-11 ENCOUNTER — APPOINTMENT (OUTPATIENT)
Dept: PHYSICAL THERAPY | Facility: CLINIC | Age: 34
End: 2024-11-11
Payer: COMMERCIAL

## 2024-11-11 DIAGNOSIS — G90.50 COMPLEX REGIONAL PAIN SYNDROME TYPE 1, AFFECTING UNSPECIFIED SITE: ICD-10-CM

## 2024-11-11 RX ORDER — AMITRIPTYLINE HYDROCHLORIDE 10 MG/1
10 TABLET, FILM COATED ORAL NIGHTLY
Qty: 90 TABLET | Refills: 1 | Status: SHIPPED | OUTPATIENT
Start: 2024-11-11 | End: 2025-03-11

## 2024-11-18 ENCOUNTER — APPOINTMENT (OUTPATIENT)
Dept: PHYSICAL THERAPY | Facility: CLINIC | Age: 34
End: 2024-11-18
Payer: COMMERCIAL

## 2024-11-27 ENCOUNTER — HOSPITAL ENCOUNTER (OUTPATIENT)
Dept: RADIOLOGY | Facility: CLINIC | Age: 34
Discharge: HOME | End: 2024-11-27
Payer: COMMERCIAL

## 2024-11-27 ENCOUNTER — OFFICE VISIT (OUTPATIENT)
Dept: ORTHOPEDIC SURGERY | Facility: CLINIC | Age: 34
End: 2024-11-27
Payer: COMMERCIAL

## 2024-11-27 DIAGNOSIS — S82.401S FRACTURE OF TIBIA WITH FIBULA, RIGHT, OPEN, SEQUELA: ICD-10-CM

## 2024-11-27 DIAGNOSIS — M79.641 RIGHT HAND PAIN: ICD-10-CM

## 2024-11-27 DIAGNOSIS — S82.201S FRACTURE OF TIBIA WITH FIBULA, RIGHT, OPEN, SEQUELA: ICD-10-CM

## 2024-11-27 PROCEDURE — 73590 X-RAY EXAM OF LOWER LEG: CPT | Mod: RT

## 2024-11-27 PROCEDURE — 73610 X-RAY EXAM OF ANKLE: CPT | Mod: RT

## 2024-11-27 PROCEDURE — 73110 X-RAY EXAM OF WRIST: CPT | Mod: RT

## 2024-11-27 PROCEDURE — 73610 X-RAY EXAM OF ANKLE: CPT | Mod: RIGHT SIDE | Performed by: RADIOLOGY

## 2024-11-27 PROCEDURE — 99214 OFFICE O/P EST MOD 30 MIN: CPT | Performed by: ORTHOPAEDIC SURGERY

## 2024-11-27 PROCEDURE — 73110 X-RAY EXAM OF WRIST: CPT | Mod: RIGHT SIDE | Performed by: RADIOLOGY

## 2024-11-27 PROCEDURE — 73590 X-RAY EXAM OF LOWER LEG: CPT | Mod: RIGHT SIDE | Performed by: RADIOLOGY

## 2024-11-27 RX ORDER — ESCITALOPRAM OXALATE 5 MG/1
5 TABLET ORAL
COMMUNITY
Start: 2024-11-11

## 2024-11-27 ASSESSMENT — PAIN DESCRIPTION - DESCRIPTORS: DESCRIPTORS: SHOOTING;SHARP

## 2024-11-27 ASSESSMENT — PAIN - FUNCTIONAL ASSESSMENT: PAIN_FUNCTIONAL_ASSESSMENT: 0-10

## 2024-11-27 ASSESSMENT — PAIN SCALES - GENERAL: PAINLEVEL_OUTOF10: 8

## 2024-11-30 NOTE — PROGRESS NOTES
34-year-old female 1 year out from complex right sided injury with an open tibia fracture.  She unfortunately contracted severe complex regional pain syndrome and has had a hard time following through with pain management.  She had a series of things planned like ketamine injections and nerve blocks and she has had a few treatments but nothing sustaining.  She is requesting a neurology or neurosurgical evaluation for her peripheral nerve problem.  In terms of her fracture there is really nothing much more to do unfortunately.    The patient does not endorse fevers and chills. The patient does not endorse any change in her vision or hearing. They do not endorse chest pain, shortness of breath. The patient does not endorse any abdominal discomfort. They do not endorse any skin irritation or lesions. They do not endorse any new numbness and tingling or as otherwise stated in the history of present illness.    She is in no acute distress, alert and oriented x 3.    Mood and affect are appropriate.    Respirations are unlabored.    Distal limb is pink and well perfused.    Right lower extremity evaluation demonstrates well-healed surgical incisions throughout.  She is extremely hyperesthetic throughout.  She has hot cold intolerance.  She has pain with even passive motion or light touch of her skin.  Her limb is well-perfused.    I personally reviewed multiple views of the right tibia were obtained in the office today demonstrate maintenance of reduction, interval healing, and a stable position of the hardware.    34-year-old female with a healed right open tibia fracture with indwelling hardware.  She has fairly severe complex regional pain syndrome.  She was following with Dr. Centeno and she will continue to try to do so.  I like to also get her into see a neurosurgeon to see if is any peripheral nerve injection or block or ablation procedure as well as a neurologist for maximizing medical management of her nerve pain.   At this point I can see her back as needed.    Natural History reviewed. All questions answered. The patient was in agreement with the plan.      **This note was created using voice recognition software and was not corrected for typographical or grammatical errors.**

## 2024-12-02 ENCOUNTER — DOCUMENTATION (OUTPATIENT)
Dept: PHYSICAL THERAPY | Facility: CLINIC | Age: 34
End: 2024-12-02
Payer: COMMERCIAL

## 2024-12-02 NOTE — PROGRESS NOTES
"Physical Therapy    Discharge Summary    Name: William Abrams \"Millicent"  MRN: [id number]  : 1990  Date: 24    Discharge Summary: PT    Discharge Information: Date of evaluation  and Number of attended visits 2    Therapy Summary: Pt only attended one follow-up visit after initial eval.    Discharge Status: return to MD as needed      Rehab Discharge Reason: Failed to schedule and/or keep follow-up appointment(s)  "

## 2024-12-30 ENCOUNTER — OFFICE VISIT (OUTPATIENT)
Dept: PODIATRY | Facility: HOSPITAL | Age: 34
End: 2024-12-30
Payer: COMMERCIAL

## 2024-12-30 VITALS
OXYGEN SATURATION: 96 % | WEIGHT: 107 LBS | DIASTOLIC BLOOD PRESSURE: 71 MMHG | HEART RATE: 127 BPM | BODY MASS INDEX: 18.95 KG/M2 | SYSTOLIC BLOOD PRESSURE: 130 MMHG

## 2024-12-30 DIAGNOSIS — G89.29 CHRONIC PAIN OF RIGHT ANKLE: ICD-10-CM

## 2024-12-30 DIAGNOSIS — S82.401S TIBIA AND FIBULA OPEN FRACTURE, RIGHT, SEQUELA: ICD-10-CM

## 2024-12-30 DIAGNOSIS — M25.571 CHRONIC PAIN OF RIGHT ANKLE: ICD-10-CM

## 2024-12-30 DIAGNOSIS — S82.201S TIBIA AND FIBULA OPEN FRACTURE, RIGHT, SEQUELA: ICD-10-CM

## 2024-12-30 DIAGNOSIS — G90.521 COMPLEX REGIONAL PAIN SYNDROME TYPE 1 OF RIGHT LOWER EXTREMITY: Primary | ICD-10-CM

## 2024-12-30 PROCEDURE — 99214 OFFICE O/P EST MOD 30 MIN: CPT | Performed by: STUDENT IN AN ORGANIZED HEALTH CARE EDUCATION/TRAINING PROGRAM

## 2024-12-30 RX ORDER — ESCITALOPRAM OXALATE 10 MG/1
10 TABLET ORAL
COMMUNITY
Start: 2024-12-11 | End: 2025-01-09 | Stop reason: ALTCHOICE

## 2024-12-30 ASSESSMENT — PAIN SCALES - GENERAL: PAINLEVEL_OUTOF10: 6

## 2024-12-30 NOTE — PROGRESS NOTES
Initial Podiatric Office Visit:    Chief Complaint:   Chief Complaint   Patient presents with    New Patient Visit     Broken leg and ankle.        HPI:  This 34 y.o. female with PMH indicated below presents complaining of B/L ankle. Patient has extensive orthopedic history. Per patient, she broke her R ankle and tibia and fibula in November 2023 after walking up the stairs. Patient then underwent ORIF with Dr. Guerra. Patient then began undergoing PT. Patient began having worsening pain in her R LE. States the pain would be burning like her leg was on fire, then numb. States she began having trouble walking and driving. PT and ortho diagnosed patient with CRPS and she began seeing pain management. Patient states she received a nerve injection in her back, which worsened the CRPS. States pain management is now recommending ketamine infusions, which she is hesitant to do. Patient was told to begin mirror PT sessions, but states she has not been able to find a location nearby that does these sessions. In June 2024, patient fell and broke her L lateral malleolus. Patient was followed by Dr. Guerra for her L ankle fracture. Patient states she wore an ankle brace on her left ankle, which she has transitioned out of.  States she has been wearing a lace up ankle brace on her R ankle. Patient is requesting a second opinion. States she has an appointment with neurology in January. Patient denies any constitutional symptoms and has no other pedal complaints at this time.       PCP:  Corinne Hooker MD:    [facility]  Past Medical History:   Diagnosis Date    Disease of stomach and duodenum, unspecified     Stomach problems    Encounter for gynecological examination (general) (routine) without abnormal findings 02/12/2016    Well woman exam    Headache, unspecified 12/30/2013    Headache    Other conditions influencing health status     Menstruation    Other conditions influencing health status     History of pregnancy     Presence of (intrauterine) contraceptive device     IUD contraception   :    MEDICATIONS  Current Outpatient Medications   Medication Sig Dispense Refill    escitalopram (Lexapro) 10 mg tablet Take 1 tablet (10 mg) by mouth once daily in the morning. Take before meals.      amitriptyline (Elavil) 10 mg tablet TAKE 1 TABLET (10 MG) BY MOUTH ONCE DAILY AT BEDTIME. 90 tablet 1    gabapentin (Neurontin) 800 mg tablet Take 1 tablet (800 mg) by mouth 3 times a day. 90 tablet 11    levonorgestreL (Liletta) 20.4 mcg/24 hrs (8 yrs) 52 mg intrauterine device 52 mg by intrauterine route once daily.      loratadine (Claritin) 10 mg tablet Take 1 tablet (10 mg) by mouth once daily as needed.      NON FORMULARY Take 1 each by mouth once daily. Tumeric suppliment      sucralfate (Carafate) 100 mg/mL suspension Take 10 mL (1 g) by mouth 4 times a day with meals. Take 1 hour before meals and at bedtime 1200 mL 11    topiramate (Topamax) 100 mg tablet Take 1 tablet (100 mg) by mouth 2 times a day. 60 tablet 11    topiramate (Topamax) 25 mg tablet Take 1 tablet (25 mg) by mouth once daily. Take 1 tab at bedtime x7 days --> 1 BID x7 days--> 1 qAM 2 qHS x7 days--> 2 BID x7 days-->2 qAM, 3 qHS x7 days--> 3 BID x7 days --> 3 qAM, 4 at bedtime x7 days -->4 BID & call the office for the 100 mg strength 196 tablet 0     No current facility-administered medications for this visit.   :  Allergies   Allergen Reactions    Citrus And Derivatives Angioedema    Fish Derived Hives and Angioedema    Peanut Hives and Angioedema    Shellfish Derived Hives and Angioedema    Lactose Constipation    Latex Unknown   :  No past surgical history on file.  No family history on file.:  Social History     Socioeconomic History    Marital status:    Tobacco Use    Smoking status: Some Days     Types: Cigarettes   Vaping Use    Vaping status: Never Used   Substance and Sexual Activity    Alcohol use: Not Currently    Drug use: Never     Social Drivers of  Health     Financial Resource Strain: Low Risk  (11/19/2023)    Overall Financial Resource Strain (CARDIA)     Difficulty of Paying Living Expenses: Not hard at all   Food Insecurity: No Food Insecurity (11/19/2023)    Hunger Vital Sign     Worried About Running Out of Food in the Last Year: Never true     Ran Out of Food in the Last Year: Never true   Transportation Needs: No Transportation Needs (1/26/2024)    OASIS : Transportation     Lack of Transportation (Medical): No     Lack of Transportation (Non-Medical): No     Patient Unable or Declines to Respond: No   Physical Activity: Insufficiently Active (11/19/2023)    Exercise Vital Sign     Days of Exercise per Week: 4 days     Minutes of Exercise per Session: 30 min   Stress: No Stress Concern Present (11/19/2023)    [employer] [place] [employer] - Occupational Stress Questionnaire     Feeling of Stress : Not at all   Social Connections: Feeling Socially Integrated (1/26/2024)    OASIS : Social Isolation     Frequency of experiencing loneliness or isolation: Never   Recent Concern: Social Connections - Socially Isolated (11/19/2023)    Social Connection and Isolation Panel [NHANES]     Frequency of Communication with Friends and Family: Once a week     Frequency of Social Gatherings with Friends and Family: Once a week     Attends Church Services: Never     Active Member of Clubs or Organizations: No     Attends Club or Organization Meetings: Never     Marital Status:    Intimate Partner Violence: Not At Risk (11/19/2023)    Humiliation, Afraid, Rape, and Kick questionnaire     Fear of Current or Ex-Partner: No     Emotionally Abused: No     Physically Abused: No     Sexually Abused: No   Housing Stability: Low Risk  (11/19/2023)    Housing Stability Vital Sign     Unable to Pay for Housing in the Last Year: No     Number of Places Lived in the Last Year: 2     Unstable Housing in the Last Year: No       REVIEW OF  SYSTEMS    GENERAL: No weight loss, malaise or fevers.  HEENT: Negative for frequent or significant headaches,   RESPIRATORY: Negative for cough, wheezing or shortness of breath.  CARDIOVASCULAR: Negative for chest pain, leg swelling or palpitations.  GI: Negative for abdominal discomfort,  MUSCULOSKELETAL: negativ back pain      SKIN: Negative for lesions, rash, and itching.  NEURO: positive numbess, tingling or burning in feet      Physical Exam:   Patient is alert and oriented x 3 in NAD    Vascular:   Palpable Dorsalis Pedis and Posterior Tibial Pulses B/L  Capillary Fill time < 3 seconds to digits 1-5 B/L  Skin temperature warm to cool tibial tuberosity to the digits B/L, R cooler than left  No edema.  No  varicosities    Neurological:   Intact light touch/epicritic sensation B/L  ntact sharp/dull sensations      Dermatological:   Skin appears well hydrated and supple. good color, texture, turgor. No open lesions present. Hyperkeratosis absent . Webspaces clean and dry 1-4 b/l. Nails 1-5 b/l appear normal.    Musculoskeletal/Orthopaedic:     +5/5 muscle strength Dorsiflexion, Plantarflexion, Inversion, Eversion on L. Pain with palpation of lateral malleolus.   4/5 muscle strength Df, PF, inversion, eversion on R. Diffuse pain with palpation of ankle and LE On R    ASSESSMENT:   1. Complex regional pain syndrome type 1 of right lower extremity        2. Tibia and fibula open fracture, right, sequela        3. Chronic pain of right ankle             Plan:    - Initial Office Visit   - Etiology and treatment options were discussed with the patient.  - Instructed patient to keep neurology appointment as well as scrambler PT appointment.  - Will reach out to Nikki about finding location for mirror therapy PT.   - Instructed patient to reach out to Dr. Guerra and PCP about issues with obtaining disability.  - From a podiatry standpoint, there are no other options to recommend for patient's CRPS. Recommend patient  continue to follow with pain management, and go to ordered consults.   -RTC as needed    Lizeth Frost DPM PGY-1  Podiatric Surgery & Medicine     I saw and evaluated the patient. I personally obtained the key and critical portions of the history and physical exam or was physically present for key and critical portions performed by the resident/fellow. I reviewed the resident/fellow's documentation and discussed the patient with the resident/fellow. I agree with the resident/fellow's medical decision making as documented in the note.    I spent 55 minutes in the professional and overall care of this patient, extensive chart review.     Lizeth Kc DPM

## 2025-01-09 ENCOUNTER — OFFICE VISIT (OUTPATIENT)
Dept: PAIN MEDICINE | Facility: CLINIC | Age: 35
End: 2025-01-09
Payer: COMMERCIAL

## 2025-01-09 ENCOUNTER — OFFICE VISIT (OUTPATIENT)
Dept: NEUROSURGERY | Facility: HOSPITAL | Age: 35
End: 2025-01-09
Payer: COMMERCIAL

## 2025-01-09 VITALS
RESPIRATION RATE: 16 BRPM | SYSTOLIC BLOOD PRESSURE: 126 MMHG | HEIGHT: 63 IN | DIASTOLIC BLOOD PRESSURE: 78 MMHG | BODY MASS INDEX: 18.96 KG/M2 | WEIGHT: 107 LBS | HEART RATE: 114 BPM

## 2025-01-09 VITALS
HEIGHT: 63 IN | HEART RATE: 105 BPM | BODY MASS INDEX: 18.96 KG/M2 | WEIGHT: 107 LBS | OXYGEN SATURATION: 98 % | SYSTOLIC BLOOD PRESSURE: 108 MMHG | DIASTOLIC BLOOD PRESSURE: 70 MMHG | RESPIRATION RATE: 16 BRPM

## 2025-01-09 DIAGNOSIS — S82.401B TIBIA/FIBULA FRACTURE, RIGHT, OPEN TYPE I OR II, INITIAL ENCOUNTER: ICD-10-CM

## 2025-01-09 DIAGNOSIS — S82.401S TIBIA AND FIBULA OPEN FRACTURE, RIGHT, SEQUELA: ICD-10-CM

## 2025-01-09 DIAGNOSIS — S82.401S FRACTURE OF TIBIA WITH FIBULA, RIGHT, OPEN, SEQUELA: ICD-10-CM

## 2025-01-09 DIAGNOSIS — R26.2 DIFFICULTY WALKING: ICD-10-CM

## 2025-01-09 DIAGNOSIS — S82.201S TIBIA AND FIBULA OPEN FRACTURE, RIGHT, SEQUELA: ICD-10-CM

## 2025-01-09 DIAGNOSIS — S82.201S FRACTURE OF TIBIA WITH FIBULA, RIGHT, OPEN, SEQUELA: ICD-10-CM

## 2025-01-09 DIAGNOSIS — G57.71 COMPLEX REGIONAL PAIN SYNDROME TYPE 2 OF RIGHT LOWER EXTREMITY: Primary | ICD-10-CM

## 2025-01-09 DIAGNOSIS — S82.201B TIBIA/FIBULA FRACTURE, RIGHT, OPEN TYPE I OR II, INITIAL ENCOUNTER: ICD-10-CM

## 2025-01-09 DIAGNOSIS — G90.529 COMPLEX REGIONAL PAIN SYNDROME TYPE 1 OF LOWER EXTREMITY, UNSPECIFIED LATERALITY: ICD-10-CM

## 2025-01-09 PROCEDURE — 3008F BODY MASS INDEX DOCD: CPT | Performed by: ANESTHESIOLOGY

## 2025-01-09 PROCEDURE — 99204 OFFICE O/P NEW MOD 45 MIN: CPT | Performed by: STUDENT IN AN ORGANIZED HEALTH CARE EDUCATION/TRAINING PROGRAM

## 2025-01-09 PROCEDURE — 99214 OFFICE O/P EST MOD 30 MIN: CPT | Performed by: STUDENT IN AN ORGANIZED HEALTH CARE EDUCATION/TRAINING PROGRAM

## 2025-01-09 PROCEDURE — 99213 OFFICE O/P EST LOW 20 MIN: CPT | Performed by: STUDENT IN AN ORGANIZED HEALTH CARE EDUCATION/TRAINING PROGRAM

## 2025-01-09 PROCEDURE — 99204 OFFICE O/P NEW MOD 45 MIN: CPT | Performed by: ANESTHESIOLOGY

## 2025-01-09 PROCEDURE — 99214 OFFICE O/P EST MOD 30 MIN: CPT | Performed by: ANESTHESIOLOGY

## 2025-01-09 RX ORDER — ESCITALOPRAM OXALATE 10 MG/1
10 TABLET ORAL DAILY
COMMUNITY

## 2025-01-09 ASSESSMENT — ENCOUNTER SYMPTOMS
RESPIRATORY NEGATIVE: 1
PSYCHIATRIC NEGATIVE: 1
GASTROINTESTINAL NEGATIVE: 1
WEAKNESS: 1
NUMBNESS: 1
EYES NEGATIVE: 1
ENDOCRINE NEGATIVE: 1
CARDIOVASCULAR NEGATIVE: 1
HEMATOLOGIC/LYMPHATIC NEGATIVE: 1
JOINT SWELLING: 1
CONSTITUTIONAL NEGATIVE: 1
ALLERGIC/IMMUNOLOGIC NEGATIVE: 1

## 2025-01-09 ASSESSMENT — PATIENT HEALTH QUESTIONNAIRE - PHQ9
SUM OF ALL RESPONSES TO PHQ9 QUESTIONS 1 AND 2: 0
2. FEELING DOWN, DEPRESSED OR HOPELESS: NOT AT ALL
1. LITTLE INTEREST OR PLEASURE IN DOING THINGS: NOT AT ALL

## 2025-01-09 ASSESSMENT — LIFESTYLE VARIABLES: TOTAL SCORE: 1

## 2025-01-09 ASSESSMENT — PAIN - FUNCTIONAL ASSESSMENT: PAIN_FUNCTIONAL_ASSESSMENT: 0-10

## 2025-01-09 ASSESSMENT — PAIN SCALES - GENERAL
PAINLEVEL_OUTOF10: 7

## 2025-01-09 ASSESSMENT — PAIN DESCRIPTION - DESCRIPTORS: DESCRIPTORS: BURNING;SHARP;NUMBNESS

## 2025-01-09 NOTE — PROGRESS NOTES
"Subjective   Patient ID: William Abrams \"Aleksander\" is a 34 y.o. female who presents for right lower leg pain.  HPI  Patient here today for new patient consultation for her complex regional pain syndrome of her right lower extremity.  She reports that she suffered a tibial fibula fracture when she was walking and looking at her phone and fell.  She subsequently had surgery at [facility].  She follows with orthopedic surgeon Dr. Guerra.  She also had consultations with Dr. Centeno about her CRPS.  She was diagnosed by Dr. Centeno with CRPS type I of right lower extremity.  All of these notes can be found in the [facility] epic chart.  She had been working diligently in physical therapy and was making good strides on her range of motion and pain.  She then underwent a lumbar sympathetic block with Dr. Centeno and reports that this reversed all of her forward progress.  She has significant pain in the ankle, swelling, hypersensitivity.  As well as skin color changes.  She has weakness in her knee.  She reports that wearing a brace is important for her as she does feel like she will fall.  She also has support around the ankle.  She states that this makes her feel confident to walk around.  She does not use any other assistive devices.  She has been out of physical therapy for several months but but we willing to go back to try to get back to where she was.  She has not discussed any other interventional treatments.  She is currently on amitriptyline and topiramate for her pain which does help mild to moderately.  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  She is trying to pursue some type of temporary disability until she can get rehabilitated and get her pain under control.  She is reporting her pain today is a 6-7 out of 10 depending on activity level.  Pain is worse with standing and walking.  It is worse when other people touch it.  She has a hard time showering.       Review of Systems "   Constitutional: Negative.    HENT: Negative.     Eyes: Negative.    Respiratory: Negative.     Cardiovascular: Negative.    Gastrointestinal: Negative.    Endocrine: Negative.    Genitourinary: Negative.    Musculoskeletal:  Positive for gait problem and joint swelling.   Skin: Negative.    Allergic/Immunologic: Negative.    Neurological:  Positive for weakness and numbness.   Hematological: Negative.    Psychiatric/Behavioral: Negative.         Objective   Physical Exam  Vitals and nursing note reviewed.   Constitutional:       Appearance: Normal appearance.   HENT:      Head: Normocephalic and atraumatic.      Right Ear: Ear canal and external ear normal.      Left Ear: Ear canal and external ear normal.      Nose: Nose normal.      Mouth/Throat:      Mouth: Mucous membranes are moist.      Pharynx: Oropharynx is clear.   Eyes:      Conjunctiva/sclera: Conjunctivae normal.      Pupils: Pupils are equal, round, and reactive to light.   Cardiovascular:      Rate and Rhythm: Normal rate.   Pulmonary:      Effort: Pulmonary effort is normal. No respiratory distress.   Musculoskeletal:      Cervical back: Normal range of motion and neck supple.      Lumbar back: Tenderness present. Normal range of motion.      Right ankle: Swelling present. Decreased range of motion.        Legs:       Comments: Patient initially had unloading brace on right knee.  As well as Velcro ankle support.  Has minimal swelling around the ankle.  Allodynia noted around the ankle and dorsal of the foot.  There is swelling as well.   Skin:     General: Skin is warm and dry.   Neurological:      Mental Status: She is alert.   Psychiatric:         Mood and Affect: Mood normal.         Thought Content: Thought content normal.         Assessment/Plan   Problem List Items Addressed This Visit             ICD-10-CM    Tibia and fibula open fracture, right, sequela S82.201S, S82.401S    Tibia/fibula fracture, right, open type I or II, initial  encounter S82.201B, S82.401B    Difficulty walking R26.2    Complex regional pain syndrome type 1 G90.50        I nice discussion with the patient today our plan will be as follows.    Radiology: All available imaging was reviewed today.    Physically: I will have the patient restart physical therapy focusing on a complex regional pain syndrome protocol with the therapist out at the  wellness center in [place].  I will have her do a combination of land and water therapy to help with desensitization as well as restore range of motion and strength.    Psychologically: I did check with the patient's about her mental health she says at this time she does not feel like she needs any therapy and is trying to be strong through the whole process.    Medication: Patient should continue with current medication regimen at this time she does not need any refills.    Duration: Greater than 6 months.    Intervention: We talked about many different interventions today including peripheral nerve blocks, peripheral nerve stimulation, spinal cord stimulation.  At this time we will proceed forward with scrambler therapy for her right lower extremity incoordination with her aqua and land therapy program that we will be restarting for her.  The patient did voiced understanding and would like to move forward with this.  Risks, benefit, and alternatives of the procedure were discussed with the patient.  Oswestry score has been compelted and recorded.      Dermatome mapping  Right lower extremity L4, L5, S1 proximal to the knee        Please note that this report has been produced using speech recognition software. It may contain errors related to grammar, punctuation or spelling. Electronically signed, but not reviewed.       Sourav August MD 01/09/25 3:21 PM

## 2025-01-09 NOTE — PROGRESS NOTES
[facility] [place]  Department of Neurological Surgery  New Patient Visit    History of Present Illness:  William Abrams is a 34 y.o. year old female who presents with right leg numbness and hyperalgesia / allodynia. William had a traumatic RLE injury in NOV 2023 falling down the stairs with a severe open tibia/fibula fracture s/p intramedullary nail.           Patient's BMI is Body mass index is 18.95 kg/m².    14/14 systems reviewed and negative other than what is listed in the history of present illness    Patient Active Problem List   Diagnosis    Tibia and fibula open fracture, right, sequela    Tibia/fibula fracture, right, open type I or II, initial encounter    Right ankle pain    Difficulty walking    Left ankle pain    Complex regional pain syndrome type 1    Back pain    Dizziness    Imprisonment     Past Medical History:   Diagnosis Date    Disease of stomach and duodenum, unspecified     Stomach problems    Encounter for gynecological examination (general) (routine) without abnormal findings 02/12/2016    Well woman exam    Headache, unspecified 12/30/2013    Headache    Other conditions influencing health status     Menstruation    Other conditions influencing health status     History of pregnancy    Presence of (intrauterine) contraceptive device     IUD contraception     No past surgical history on file.  Social History     Tobacco Use    Smoking status: Former     Types: Cigarettes    Smokeless tobacco: Not on file   Substance Use Topics    Alcohol use: Not Currently     family history is not on file.    Current Outpatient Medications:     amitriptyline (Elavil) 10 mg tablet, TAKE 1 TABLET (10 MG) BY MOUTH ONCE DAILY AT BEDTIME., Disp: 90 tablet, Rfl: 1    escitalopram (Lexapro) 10 mg tablet, Take 1 tablet (10 mg) by mouth once daily in the morning. Take before meals., Disp: , Rfl:     gabapentin (Neurontin) 800 mg tablet, Take 1 tablet (800 mg) by mouth 3 times a day., Disp: 90  tablet, Rfl: 11    levonorgestreL (Liletta) 20.4 mcg/24 hrs (8 yrs) 52 mg intrauterine device, 52 mg by intrauterine route once daily., Disp: , Rfl:     loratadine (Claritin) 10 mg tablet, Take 1 tablet (10 mg) by mouth once daily as needed., Disp: , Rfl:     NON FORMULARY, Take 1 each by mouth once daily. Tumeric suppliment, Disp: , Rfl:     sucralfate (Carafate) 100 mg/mL suspension, Take 10 mL (1 g) by mouth 4 times a day with meals. Take 1 hour before meals and at bedtime, Disp: 1200 mL, Rfl: 11    topiramate (Topamax) 100 mg tablet, Take 1 tablet (100 mg) by mouth 2 times a day., Disp: 60 tablet, Rfl: 11    topiramate (Topamax) 25 mg tablet, Take 1 tablet (25 mg) by mouth once daily. Take 1 tab at bedtime x7 days --> 1 BID x7 days--> 1 qAM 2 qHS x7 days--> 2 BID x7 days-->2 qAM, 3 qHS x7 days--> 3 BID x7 days --> 3 qAM, 4 at bedtime x7 days -->4 BID & call the office for the 100 mg strength (Patient not taking: Reported on 1/9/2025), Disp: 196 tablet, Rfl: 0  Allergies   Allergen Reactions    Citrus And Derivatives Angioedema    Fish Derived Hives and Angioedema    Peanut Hives and Angioedema    Shellfish Derived Hives and Angioedema    Lactose Constipation    Latex Unknown       Physical Examination    General: Well developed, awake/alert/oriented x3, no distress, alert and cooperative  Skin: Warm and dry, no lesions, no rashes  ENMT: Mucous membranes moist, no apparent injury, no lesions seen  Head/Neck: Neck Supple, no apparent injury  Respiratory/Thorax: Normal breath sounds with good chest expansion, thorax symmetric  Cardiovascular: No pitting edema, no JVD    Motor Strength: 5/5 Throughout all extremities    Muscle Bulk: Normal and symmetric in all extremities    Sensation: severe hyperalgesia / allodynia    Right Plantar foot - full numbness   Right foot skin changes.       Results    I personally reviewed and interpreted the imaging results.      Assessment and Plan:  William Abrams is a 34 y.o. year  old female who presents with right leg numbness and hyperalgesia / allodynia. William had a traumatic RLE injury in NOV 2023 falling down the stairs with a severe open tibia/fibula fracture s/p intramedullary nail.           William's symptoms encompass her entire right leg distal to the knee without a defined nerve distribution.  She has severe hyperalgesia / allodynia.  She has no weakness on exam. She has temperature sensitivity and skin changes.  Her findings all correlate with CRPS.  There is no neurosurgical intervention to be offered.  Referred her to [facility] per neurology team recommendation.     I have reviewed all prior documentation and reviewed the electronic medical record since admission. I have personally have reviewed all advanced imaging not just the reports and used my interpretation as documented as the relevant findings. I have reviewed the risks and benefits of all treatment recommendations listed in this note with the patient and family.       The above clinical summary has been dictated with voice recognition software. It has not been proofread for grammatical errors, typographical mistakes, or other semantic inconsistencies.    Thank you for visiting our office today. It was our pleasure to take part in your healthcare.     Do not hesitate to call with any questions regarding your plan of care after leaving at [phone] M-F 8am-4pm.     To clinicians, thank you very much for this kind referral. It is a privilege to partner with you in the care of your patients. My office would be delighted to assist you with any further consultations or with questions regarding the plan of care outlined. Do not hesitate to call the office or contact me directly.       Sincerely,      Peterson Ibarra MD, PhD  Attending Neurosurgeon, [facility]   of Neurological Surgery  [employer]  Office: [phone]  Fax: [phone]    [facility]  [address]  [address] [address]

## 2025-01-21 ENCOUNTER — EVALUATION (OUTPATIENT)
Dept: PHYSICAL THERAPY | Facility: CLINIC | Age: 35
End: 2025-01-21
Payer: COMMERCIAL

## 2025-01-21 DIAGNOSIS — M25.571 CHRONIC PAIN OF RIGHT ANKLE: Primary | ICD-10-CM

## 2025-01-21 DIAGNOSIS — G89.29 CHRONIC PAIN OF RIGHT ANKLE: Primary | ICD-10-CM

## 2025-01-21 DIAGNOSIS — G90.529 COMPLEX REGIONAL PAIN SYNDROME TYPE 1 OF LOWER EXTREMITY, UNSPECIFIED LATERALITY: ICD-10-CM

## 2025-01-21 DIAGNOSIS — G89.29 CHRONIC PAIN OF RIGHT KNEE: ICD-10-CM

## 2025-01-21 DIAGNOSIS — M25.561 CHRONIC PAIN OF RIGHT KNEE: ICD-10-CM

## 2025-01-21 PROCEDURE — 97161 PT EVAL LOW COMPLEX 20 MIN: CPT | Mod: GP

## 2025-01-21 PROCEDURE — 97535 SELF CARE MNGMENT TRAINING: CPT | Mod: GP

## 2025-01-21 PROCEDURE — 97110 THERAPEUTIC EXERCISES: CPT | Mod: GP

## 2025-01-21 NOTE — PROGRESS NOTES
"Physical Therapy    Physical Therapy Evaluation and Treatment      Patient Name: William Abrams \"Aleksander\"  MRN: [id number]  Today's Date: 1/21/2025    Time Calculation  Start Time: 1045  Stop Time: 1130  Time Calculation (min): 45 min    Insurance:  [place]: 1 out of [place]  Authorization information: none  Insurance Type: 2025 BENEFIT / NO AUTH / NO OOP / MED [place] / [employer]  Certification Period Start Date: 1/21/25  Certification Period End Date: 4/21/25    General:  Reason for visit: Complex regional pain syndrome type 1 of lower extremity, unspecified laterality    Referred by: Sourav August MD    Current Problem:   1. Chronic pain of right ankle  Follow Up In Physical Therapy      2. Complex regional pain syndrome type 1 of lower extremity, unspecified laterality  Referral to Physical Therapy      3. Chronic pain of right knee  Follow Up In Physical Therapy          SUBJECTIVE:  William Abrams \"Aleksander\" is a 34 y.o. female referred to PT for Complex regional pain syndrome type 1 of lower extremity, unspecified laterality. Patient presents with chief complaint of chronic R ankle and R knee pain secondary to CRPS. Patient reports that she had R tibial/fibular fracture on 11/2023 with IM by Dr. Guerra. Patient reports that she has had reoccurring issues with R leg secondary to chronic pain and increased sensitivity. Patient reports that she has since been diagnosed with CPRS and has been seeing pain management. Patient reports that in August got nerve block injection and had bad reaction broke out in hives and her back hurt and feels that I reversed overall progress. Patient reports that she also broke L fibula in July did not require surgical intervention, participated in PT.   Patient reports that she wears R knee brace and R ankle brace every day. Patient states that she cannot walk without braces as she does not feel comfortable or stable. Patient would like to get to point where she can walk without " braces.   Patient has also been taking medication for pain and wants to eventually wean off but right now cannot. Patient reporst that in April she is starting electrical stimulation treatment with Dr. Ibarra.     Onset: 2024  Imaging:   XR tibia fibula right (11/28/24)  FINDINGS:  Right ankle, three views. Right tibia fibula, two views.  Intramedullary nail and screw fixation of remote healed mid tibial  diaphyseal fracture. The hardware is intact. Remote healed fracture  through the proximal fibula as well without instrumentation. The  joints are maintained. No soft tissue abnormality seen.      IMPRESSION:  ORIF of a mid tibial diaphyseal fracture with intact hardware  Remote healed fracture through the proximal fibula without  Instrumentation    Occupation: Not working-    PLOF: standing for too long, cold temperature, walking for too long, stairs, running, putting shoes on, touching leg   Home living: Lives with    Stairs in home   5 step to get into home  Bedroom on second floor - railing on 1 (one step at time)  Walk in shower   Able to get dressed indepdnently but hurts to bend knee     Relevant Past Medical History:Reviewed in chart; Headache, difficulty walking, CRPS type 1, back pain, dizziness,   Surgical History: Reviewed in chart; Tib/fib fracture with IMN (11/19/23)   Medications: Reviewed in chart; Amitriptyline, lexapro, gabapentin, levonorgestrel, sucralfate, topamax   Allergies: citrus and derivative, fish derived, peanut, shellfish derived, lactose, latex     Precautions: PMHx considerations: Headache, difficulty walking, CRPS type 1, back pain, dizziness, Tib/fib fracture with IMN (11/19/23)   STEADI Fall Risk:  (score of 4+ indicates fall risk)- did not fill out during evaluation- will complete at next follow up visit       PT Outcome Measure:   Other Measures  Lower Extremity Funtional Score (LEFS): 32/80    Pain:  Lowest:  6/10  Highest:  9/10  Location:  R knee (medial) and  "R ankle  Description:  throbbing, aching, numb, cold   Aggravating Factors:  standing for too long, cold/hot temperature, walking for too long, stairs, running, putting shoes on, touching leg   Relieving Factors:  none   Sleep Pattern:  sometimes pain can wake up at night   Previous Interventions:  PT in past   PT 9/23/24-12/2/24  PT 6/24/24- 8/21/24       Red Flags: Do you have any of the following? Numbness/tingling (chronic)   Fever/chills, unexplained weight changes, dizziness/fainting, unexplained change in bowel or bladder functions, unexplained malaise or muscle weakness, night pain/sweats, numbness or tingling    Patient/Family Goal: \"get out of the braces, and be able to manage pain and gain stability.     OBJECTIVE:    Observation/Posture: Patient presents with lace up ankle brace on R ankle and velcro brace on R knee  Able to demonstrate hip and knee flexion to get boots on and off    Gait:  Deviations: Patient demonstrates antalgic gait pattern.     Palpation:  Hypersensitivity to light touch palpation on R foot     Range of Motion:   KNEE ROM LEFT RIGHT    AROM AROM   Flexion (0-135) 140 120   Extension (0) 0 0      ANKLE ROM AROM    LEFT RIGHT   Dorsiflexion (20) 20 20 *P   Plantarflexion (30-50) 35 20 *P   Inversion (30) 25 17 *P   Eversion (20) 20 20 *P      Strength:  HIP MMT LEFT RIGHT   Flexion 4-/5 4-/5   Adduction  4-/5 4-/5   Abduction 4-/5 4-/5     KNEE MMT LEFT RIGHT   Flexion 4-/5 3/5   Extension 4-/5 Able to perform quad set      ANKLE MMT LEFT RIGHT   Dorsiflexion 4/5 3-/5   Plantarflexion 4/5 3-/5   Inversion 4/5 3-/5   Eversion 4/5 3-/5     Treatments:  Therapeutic Exercise: (10 minutes)  Supine R heel slide x3   Bridge x10 (focus on equal weight bearing)   Supine R qaud set with towel x5   Clam shell x5 ea.   Seated HR/TR x5     Therapeutic Activity: (8 minutes)  OP Education: Initial evaluation completed, findings and plan of care discussed with patient. Patient education on purpose of " interventions in order to improve proximal hip strength to improve LE stability. Patient education on benefits of trial of aquatic based interventions in order to aid in recovery and improve mobility. . Patient performed and was instructed in an individualized HEP with handout issued as below.     HEP:  Access Code: [email or web address]  URL: [email or web address]  Date: 01/21/2025  Prepared by: Moon    Exercises  - Clamshell  - 1 x daily - 7 x weekly - 2 sets - 10 reps  - Supine Bridge  - 1 x daily - 7 x weekly - 2 sets - 10 reps  - Supine Heel Slide  - 1 x daily - 7 x weekly - 2 sets - 10 reps  - Supine Quad Set  - 1 x daily - 7 x weekly - 2 sets - 10 reps  - Seated Heel Toe Raises  - 1 x daily - 7 x weekly - 2 sets - 10 reps  ASSESSMENT:  William Abrams is a 34 y.o. female referred to PT for Complex regional pain syndrome type 1 of lower extremity, unspecified laterality. Patient presents with chief complaint of chronic R ankle and R knee pain secondary to CRPS. Patient demonstrates poor gait mechanics, increased sensitivty and pain with all planes of R ankle AROM. Patient demonstrates decreased gross LE strength. Patient would benefit from trial of PT in order to address aforementioned impairments.     Charges:   Eval Complexity: Low    Therapeutic Exercise: 1 unit   Self Care: 1 unit     Response to treatment: Pt verbalized good understanding of education provided. Pt demonstrated appropriate performance of HEP with good understanding. Did not exhibit exacerbation of symptoms at end of session.      PLAN:  Trial of aquatic based therapy and then recheck on land to assess progress/transition to land based therapy if appropriate   OP PT PLAN:  Treatment/Interventions: Aquatic Therapy , Education/Instruction , Gait training , Manual Therapy  , Neuromuscular re-education , Self care/home management , Therapeutic activities , and Therapeutic exercise    PT Plan: Skilled PT   PT Frequency: 1-2 times per  week  Duration:5 weeks   Certification Period Start Date: 1/21/25  Certification Period End Date: 4/21/25  Visits Approved: 2025 BENEFIT / NO AUTH / NO OOP / MED [place] / [employer]ICA[employer]  Rehab Potential: Fair  Clinical Presentation: Stable   Plan of Care Agreement: Patient    Goals:    Active       PT Problem       PT Goals- STG        Start:  01/21/25    Expected End:  04/21/25       SHORT TERM GOALS:  1) Pt will report decrease in pain from 9/10 to </= 5/10 to improve quality of life.   2) Pt will demonstrate improved R ankle ROM in all planes in order to promote proper gait mechanics.   3) Pt will improve R knee AROM flexion to WFL pain free in order to improve ability to perform ADL's without increase in pain.          PT Goals- LTG        Start:  01/21/25    Expected End:  04/21/25       LONG TERM GOALS:  1) Pt will be independent with HEP to promote self management of condition.    2) Pt will improve B hip strength to >/= 4+/5 in order to promote improved proximal stability throughout LE chain.  3) Pt will demonstrate reciprocal stair negotiation with SA support to demonstrate improved LE strength and stability.

## 2025-01-27 ENCOUNTER — TREATMENT (OUTPATIENT)
Dept: PHYSICAL THERAPY | Facility: CLINIC | Age: 35
End: 2025-01-27
Payer: COMMERCIAL

## 2025-01-27 DIAGNOSIS — G89.29 CHRONIC PAIN OF RIGHT ANKLE: Primary | ICD-10-CM

## 2025-01-27 DIAGNOSIS — G89.29 CHRONIC PAIN OF RIGHT KNEE: ICD-10-CM

## 2025-01-27 DIAGNOSIS — M25.571 CHRONIC PAIN OF RIGHT ANKLE: Primary | ICD-10-CM

## 2025-01-27 DIAGNOSIS — M25.561 CHRONIC PAIN OF RIGHT KNEE: ICD-10-CM

## 2025-01-27 PROCEDURE — 97113 AQUATIC THERAPY/EXERCISES: CPT | Mod: GP

## 2025-01-27 ASSESSMENT — ENCOUNTER SYMPTOMS
LOSS OF SENSATION IN FEET: 1
DEPRESSION: 1
OCCASIONAL FEELINGS OF UNSTEADINESS: 1

## 2025-01-27 ASSESSMENT — PAIN - FUNCTIONAL ASSESSMENT: PAIN_FUNCTIONAL_ASSESSMENT: 0-10

## 2025-01-27 ASSESSMENT — PAIN SCALES - GENERAL: PAINLEVEL_OUTOF10: 8

## 2025-01-27 NOTE — PROGRESS NOTES
"Physical Therapy    Physical Therapy Treatment    Patient Name: William Abrams \"Aleksander\"  MRN: [id number]  Today's Date: 1/27/2025    Time Calculation  Start Time: 1505  Stop Time: 1543  Time Calculation (min): 38 min    Insurance:  [place]: 2 out of [place]  Authorization information: none  Insurance Type: 2025 BENEFIT / NO AUTH / NO OOP / MED [place] / [employer] MED[employer]  Certification Period Start Date: 1/21/25  Certification Period End Date: 4/21/25    General:  Reason for visit: Complex regional pain syndrome type 1 of lower extremity, unspecified laterality    Referred by: Sourav August MD    Current Problem:   1. Chronic pain of right ankle        2. Chronic pain of right knee            SUBJECTIVE:   Patient reports that  leg and knee are bothering her a lot today, knee and ankle especially. HEP is going ok.      Precautions: PMHx considerations: Headache, difficulty walking, CRPS type 1, back pain, dizziness, Tib/fib fracture with IMN (11/19/23)   STEADI Fall Risk: 14 (score of 4+ indicates fall risk)        Pain:  Pain Assessment  Pain Assessment: 0-10  0-10 (Numeric) Pain Score: 8    OBJECTIVE:    Patient demonstrates decreased speed with stair negotiation, step to pattern with B UE.  After session able to perform ascending stair negotiation with SA support demonstrating reciprocal stair negotiation.     Treatments:  Therapeutic Exercise: (38 minutes)  Water level: 3ft.  Water temperature: 92 degrees    3 ft.  Walk F/B x5' ea  Walk lateral x5'  MIP x5'  Mini squats x3'(cues to maintain flat feet)   Alt. Hip ABD/ADD x4'  A/P leg swings x3' ea    HEP:  Access Code: [email or web address]  URL: [email or web address]  Date: 01/21/2025  Prepared by: Moon     Exercises  - Clamshell  - 1 x daily - 7 x weekly - 2 sets - 10 reps  - Supine Bridge  - 1 x daily - 7 x weekly - 2 sets - 10 reps  - Supine Heel Slide  - 1 x daily - 7 x weekly - 2 sets - 10 reps  - Supine Quad Set  - 1 x daily - 7 x weekly - 2 sets - " 10 reps  - Seated Heel Toe Raises  - 1 x daily - 7 x weekly - 2 sets - 10 reps    ASSESSMENT:   Patient requiring rest breaks during each intervention secondary to adjustment of temperature for patient . Patient encouraged to perform interventions as able using benefits of buoyant environment to promote improve strength and mobility. Patient reported feeling good at end of treatment session. Patient was able to get out of pool with SA support in stair negotiation.       Charges:   Aquatic Therapy: 3 unit  t      PLAN:  Trial of aquatic based therapy and then recheck on land to assess progress/transition to land based therapy if appropriate     OP PT PLAN:  Treatment/Interventions: Aquatic Therapy , Education/Instruction , Gait training , Manual Therapy  , Neuromuscular re-education , Self care/home management , Therapeutic activities , and Therapeutic exercise    PT Plan: Skilled PT   PT Frequency: 1-2 times per week  Duration:5 weeks   Certification Period Start Date: 1/21/25  Certification Period End Date: 4/21/25  Visits Approved: 2025 BENEFIT / NO AUTH / NO OOP / MED [facility] / [employer] MEDICAID

## 2025-02-04 ENCOUNTER — APPOINTMENT (OUTPATIENT)
Dept: PHYSICAL THERAPY | Facility: CLINIC | Age: 35
End: 2025-02-04
Payer: COMMERCIAL

## 2025-02-04 DIAGNOSIS — M25.561 CHRONIC PAIN OF RIGHT KNEE: ICD-10-CM

## 2025-02-04 DIAGNOSIS — G89.29 CHRONIC PAIN OF RIGHT ANKLE: Primary | ICD-10-CM

## 2025-02-04 DIAGNOSIS — M25.571 CHRONIC PAIN OF RIGHT ANKLE: Primary | ICD-10-CM

## 2025-02-04 DIAGNOSIS — G89.29 CHRONIC PAIN OF RIGHT KNEE: ICD-10-CM

## 2025-02-04 NOTE — PROGRESS NOTES
"Physical Therapy    Physical Therapy Treatment    Patient Name: William Abrams \"Aleksander\"  MRN: [id number]  Today's Date: 2/4/2025         Insurance:  [place]: 3 out of [place]  Authorization information: none  Insurance Type: 2025 BENEFIT / NO AUTH / NO OOP / MED [employer] / [employer] MED[employer]  Certification Period Start Date: 1/21/25  Certification Period End Date: 4/21/25    General:  Reason for visit: Complex regional pain syndrome type 1 of lower extremity, unspecified laterality    Referred by: Sourav August MD    Current Problem:   1. Chronic pain of right ankle        2. Chronic pain of right knee            SUBJECTIVE:   Patient reports      Precautions: PMHx considerations: Headache, difficulty walking, CRPS type 1, back pain, dizziness, Tib/fib fracture with IMN (11/19/23)   STEADI Fall Risk: 14 (score of 4+ indicates fall risk)        Pain:       OBJECTIVE:         Treatments:  Therapeutic Exercise: (40 minutes)  Water level: 3ft.  Water temperature: 92 degrees    3 ft.  Walk F/B x5' ea  Walk lateral x5'  MIP x5'  Mini squats x3'(cues to maintain flat feet)   Alt. Hip ABD/ADD x4'  A/P leg swings x3' ea    HEP:  Access Code: [email or web address]  URL: [email or web address]  Date: 01/21/2025  Prepared by: Moon     Exercises  - Clamshell  - 1 x daily - 7 x weekly - 2 sets - 10 reps  - Supine Bridge  - 1 x daily - 7 x weekly - 2 sets - 10 reps  - Supine Heel Slide  - 1 x daily - 7 x weekly - 2 sets - 10 reps  - Supine Quad Set  - 1 x daily - 7 x weekly - 2 sets - 10 reps  - Seated Heel Toe Raises  - 1 x daily - 7 x weekly - 2 sets - 10 reps    ASSESSMENT:       Charges:   Aquatic Therapy: 3 unit       PLAN:  Trial of aquatic based therapy and then recheck on land to assess progress/transition to land based therapy if appropriate     OP PT PLAN:  Treatment/Interventions: Aquatic Therapy , Education/Instruction , Gait training , Manual Therapy  , Neuromuscular re-education , Self care/home management , " Therapeutic activities , and Therapeutic exercise    PT Plan: Skilled PT   PT Frequency: 1-2 times per week  Duration:5 weeks   Certification Period Start Date: 1/21/25  Certification Period End Date: 4/21/25  Visits Approved: 2025 BENEFIT / NO AUTH / NO OOP / MED [employer] / [employer] MED[employer]

## 2025-02-06 ENCOUNTER — TREATMENT (OUTPATIENT)
Dept: PHYSICAL THERAPY | Facility: CLINIC | Age: 35
End: 2025-02-06
Payer: COMMERCIAL

## 2025-02-06 DIAGNOSIS — G89.29 CHRONIC PAIN OF RIGHT ANKLE: Primary | ICD-10-CM

## 2025-02-06 DIAGNOSIS — M25.561 CHRONIC PAIN OF RIGHT KNEE: ICD-10-CM

## 2025-02-06 DIAGNOSIS — G89.29 CHRONIC PAIN OF RIGHT KNEE: ICD-10-CM

## 2025-02-06 DIAGNOSIS — M25.571 CHRONIC PAIN OF RIGHT ANKLE: Primary | ICD-10-CM

## 2025-02-06 PROCEDURE — 97113 AQUATIC THERAPY/EXERCISES: CPT | Mod: GP,CQ | Performed by: SPECIALIST/TECHNOLOGIST

## 2025-02-06 ASSESSMENT — PAIN - FUNCTIONAL ASSESSMENT: PAIN_FUNCTIONAL_ASSESSMENT: 0-10

## 2025-02-06 ASSESSMENT — PAIN SCALES - GENERAL: PAINLEVEL_OUTOF10: 8

## 2025-02-06 NOTE — PROGRESS NOTES
"Physical Therapy    Physical Therapy Treatment    Patient Name: William Abrams \"Aleksander\"  MRN: [id number]  Today's Date: 2/6/2025    Time Calculation  Start Time: 1555  Stop Time: 1640  Time Calculation (min): 45 min    Insurance:  [place]: 3 out of [place]  Authorization information: none  Insurance Type: 2025 BENEFIT / NO AUTH / NO OOP / MED [place] / [employer] MED[employer]  Certification Period Start Date: 1/21/25  Certification Period End Date: 4/21/25    General:  Reason for visit: Complex regional pain syndrome type 1 of lower extremity, unspecified laterality    Referred by: Sourav August MD    Current Problem:   1. Chronic pain of right ankle        2. Chronic pain of right knee            SUBJECTIVE:   Patient reports having pain on arrival 7-8 of 10.  Patient notes pain on R ankle & knee      Precautions: PMHx considerations: Headache, difficulty walking, CRPS type 1, back pain, dizziness, Tib/fib fracture with IMN (11/19/23)   STEADI Fall Risk: 14 (score of 4+ indicates fall risk)        Pain:  Pain Assessment  Pain Assessment: 0-10  0-10 (Numeric) Pain Score: 8    OBJECTIVE:         Treatments:  Therapeutic Exercise: (40 minutes)  Water level: 3ft.  Water temperature: 92 degrees    3 ft.  Walk F/B x4' ea    Noodle deep water  Hang 4'  Hip abd/add 4'  Cross country ski 4'   Hams 4'   Ankle pumps 2'  Bicycle 4'       HEP:  Access Code: [email or web address]  URL: [email or web address]  Date: 01/21/2025    ASSESSMENT:   Patient noted deep water work decreased sensitivity in knee and ankle.  Patient noted feeling better post treatment with less pain than arrival and less pain than last session.      Charges:   Aquatic Therapy: 3 unit       PLAN:  Trial of aquatic based therapy and then recheck on land to assess progress/transition to land based therapy if appropriate     OP PT PLAN:  Treatment/Interventions: Aquatic Therapy , Education/Instruction , Gait training , Manual Therapy  , Neuromuscular " re-education , Self care/home management , Therapeutic activities , and Therapeutic exercise    PT Plan: Skilled PT   PT Frequency: 1-2 times per week  Duration: 5 weeks   Certification Period Start Date: 1/21/25  Certification Period End Date: 4/21/25  Visits Approved: 2025 BENEFIT / NO AUTH / NO OOP / MED [facility] / [employer] MEDICAID

## 2025-02-11 ENCOUNTER — TREATMENT (OUTPATIENT)
Dept: PHYSICAL THERAPY | Facility: CLINIC | Age: 35
End: 2025-02-11
Payer: COMMERCIAL

## 2025-02-11 DIAGNOSIS — G89.29 CHRONIC PAIN OF RIGHT KNEE: ICD-10-CM

## 2025-02-11 DIAGNOSIS — M25.571 CHRONIC PAIN OF RIGHT ANKLE: Primary | ICD-10-CM

## 2025-02-11 DIAGNOSIS — G89.29 CHRONIC PAIN OF RIGHT ANKLE: Primary | ICD-10-CM

## 2025-02-11 DIAGNOSIS — M25.561 CHRONIC PAIN OF RIGHT KNEE: ICD-10-CM

## 2025-02-11 PROCEDURE — 97113 AQUATIC THERAPY/EXERCISES: CPT | Mod: GP,CQ

## 2025-02-11 ASSESSMENT — PAIN - FUNCTIONAL ASSESSMENT: PAIN_FUNCTIONAL_ASSESSMENT: 0-10

## 2025-02-11 ASSESSMENT — PAIN SCALES - GENERAL: PAINLEVEL_OUTOF10: 8

## 2025-02-11 NOTE — PROGRESS NOTES
"Physical Therapy    Physical Therapy Treatment    Patient Name: William Abrams \"Aleksander\"  MRN: [id number]  Today's Date: 2/11/2025  Time Calculation  Start Time: 0903  Stop Time: 0943  Time Calculation (min): 40 min    Insurance:  Visit [place]: 4 out of [place]  Authorization information: none  Insurance Type: 2025 BENEFIT / NO AUTH / NO OOP / MED NEC / [employer] MEDICA[employer]  Certification Period Start Date: 1/21/25  Certification Period End Date: 4/21/25    General:  Reason for visit: Complex regional pain syndrome type 1 of lower extremity, unspecified laterality    Referred by: Sourav August MD    Current Problem:   1. Chronic pain of right ankle  Follow Up In Physical Therapy      2. Chronic pain of right knee  Follow Up In Physical Therapy        SUBJECTIVE:   Patient reports having some pain today and that they felt some relief from last treatment session. Pt reports having had to elevate after treatment session. Pt reports having burning feeling on their foot and going up the back of their leg. Patient reports currently jogging (at a slow pace) and doing yoga.      Precautions: PMHx considerations: Headache, difficulty walking, CRPS type 1, back pain, dizziness, Tib/fib fracture with IMN (11/19/23)   STEADI Fall Risk: 14 (score of 4+ indicates fall risk)        Pain:  Pain Assessment  Pain Assessment: 0-10  0-10 (Numeric) Pain Score: 8    OBJECTIVE:     Antalgic gait pattern    Treatments:  Therapeutic Exercise: (40 minutes)  Water level: 3ft.  Water temperature: 92 degrees    3 ft.  Walk F/B x4' ea    Noodle deep water  Hang 4'  Hip abd/add 4'  Hams 4'   Ankle pumps 4'  Bicycle 4'   Cross country ski 4'     HEP:  Access Code: [email or web address]  URL: [email or web address]  Date: 01/21/2025    ASSESSMENT:   While attempting 3ft depth based exercises patient reported a large increase pain so opted to deep end. Pt reported feeling relief post treatment session specially HS stretch. 8/10 --> 6/10 post " treatment.     Charges:   Aquatic Therapy: 3 unit    PLAN:  Trial of aquatic based therapy and then recheck on land to assess progress/transition to land based therapy if appropriate.     OP PT PLAN:  Treatment/Interventions: Aquatic Therapy , Education/Instruction , Gait training , Manual Therapy  , Neuromuscular re-education , Self care/home management , Therapeutic activities , and Therapeutic exercise    PT Plan: Skilled PT   PT Frequency: 1-2 times per week  Duration: 5 weeks   Certification Period Start Date: 1/21/25  Certification Period End Date: 4/21/25  Visits Approved: 2025 BENEFIT / NO AUTH / NO OOP / MED [facility]

## 2025-02-20 ENCOUNTER — OFFICE VISIT (OUTPATIENT)
Dept: PAIN MEDICINE | Facility: CLINIC | Age: 35
End: 2025-02-20
Payer: COMMERCIAL

## 2025-02-20 VITALS
SYSTOLIC BLOOD PRESSURE: 102 MMHG | WEIGHT: 107 LBS | DIASTOLIC BLOOD PRESSURE: 68 MMHG | HEIGHT: 63 IN | BODY MASS INDEX: 18.96 KG/M2 | RESPIRATION RATE: 16 BRPM

## 2025-02-20 DIAGNOSIS — G90.521 COMPLEX REGIONAL PAIN SYNDROME TYPE 1 OF RIGHT LOWER EXTREMITY: Primary | ICD-10-CM

## 2025-02-20 PROCEDURE — 99214 OFFICE O/P EST MOD 30 MIN: CPT | Performed by: ANESTHESIOLOGY

## 2025-02-20 PROCEDURE — 3008F BODY MASS INDEX DOCD: CPT | Performed by: ANESTHESIOLOGY

## 2025-02-20 ASSESSMENT — PAIN SCALES - GENERAL
PAINLEVEL_OUTOF10: 8
PAINLEVEL_OUTOF10: 8

## 2025-02-20 ASSESSMENT — PATIENT HEALTH QUESTIONNAIRE - PHQ9
2. FEELING DOWN, DEPRESSED OR HOPELESS: NOT AT ALL
SUM OF ALL RESPONSES TO PHQ9 QUESTIONS 1 AND 2: 0
1. LITTLE INTEREST OR PLEASURE IN DOING THINGS: NOT AT ALL

## 2025-02-20 ASSESSMENT — ENCOUNTER SYMPTOMS
GASTROINTESTINAL NEGATIVE: 1
NUMBNESS: 1
CARDIOVASCULAR NEGATIVE: 1
ENDOCRINE NEGATIVE: 1
HEMATOLOGIC/LYMPHATIC NEGATIVE: 1
RESPIRATORY NEGATIVE: 1
WEAKNESS: 1
EYES NEGATIVE: 1
CONSTITUTIONAL NEGATIVE: 1
PSYCHIATRIC NEGATIVE: 1
ALLERGIC/IMMUNOLOGIC NEGATIVE: 1
JOINT SWELLING: 1

## 2025-02-20 ASSESSMENT — PAIN - FUNCTIONAL ASSESSMENT: PAIN_FUNCTIONAL_ASSESSMENT: 0-10

## 2025-02-20 NOTE — PROGRESS NOTES
"Subjective   Patient ID: William Abrams \"Aleksander\" is a 34 y.o. female who presents for No chief complaint on file..  HPI  Patient here today for follow-up and management of her complex regional pain syndrome of her right lower extremity.  Patient reports she continues to have significant pain.  Physical therapy in the water has been excellent and she would like this extended.  She was unsure about the scrambler therapy and did not know if something was supposed to start today.  She also had questions about weaning down off of her medications.  Pain is a 8 out of 10.  Pain is worse with standing and walking.  She does continue to wear her knee brace for support.  She is here today to talk talk more through the plan as she was confused about where we were moving forward.  Review of Systems   Constitutional: Negative.    HENT: Negative.     Eyes: Negative.    Respiratory: Negative.     Cardiovascular: Negative.    Gastrointestinal: Negative.    Endocrine: Negative.    Genitourinary: Negative.    Musculoskeletal:  Positive for gait problem and joint swelling.   Skin: Negative.    Allergic/Immunologic: Negative.    Neurological:  Positive for weakness and numbness.   Hematological: Negative.    Psychiatric/Behavioral: Negative.         Objective   Physical Exam  Vitals and nursing note reviewed.   Constitutional:       Appearance: Normal appearance.   HENT:      Head: Normocephalic and atraumatic.      Right Ear: Ear canal and external ear normal.      Left Ear: Ear canal and external ear normal.      Nose: Nose normal.      Mouth/Throat:      Mouth: Mucous membranes are moist.      Pharynx: Oropharynx is clear.   Eyes:      Conjunctiva/sclera: Conjunctivae normal.      Pupils: Pupils are equal, round, and reactive to light.   Cardiovascular:      Rate and Rhythm: Normal rate.   Pulmonary:      Effort: Pulmonary effort is normal. No respiratory distress.   Musculoskeletal:      Cervical back: Normal range of motion and " neck supple.      Lumbar back: Tenderness present. Normal range of motion.      Right ankle: Swelling present. Decreased range of motion.        Legs:       Comments: Patient initially had unloading brace on right knee.  As well as Velcro ankle support.  Has minimal swelling around the ankle.  Allodynia noted around the ankle and dorsal of the foot.  There is swelling as well.   Skin:     General: Skin is warm and dry.   Neurological:      Mental Status: She is alert.   Psychiatric:         Mood and Affect: Mood normal.         Thought Content: Thought content normal.         Assessment/Plan   Problem List Items Addressed This Visit             ICD-10-CM    Complex regional pain syndrome type 1 - Primary G90.50    Relevant Orders    Referral to Physical Therapy        I nice discussion with the patient today our plan will be as follows.    Radiology: All available imaging was reviewed today.    Physically: Patient has done excellent with aqua therapy and I will renew her order so she can continue to do it for another 8 weeks.    Psychologically: At this time patient did not want any referral for any mental health support.    Medication: Patient will need to start weaning down off of her topiramate as well as her gabapentin in preparation for upcoming scrambler treatment in April.  I will have our nurse Tricia reach out to her to start helping her titrate down off these medications.    Duration: Greater than 1 year    Intervention: Again I discussed with the patient about more invasive options as she upset that her pain has been so severe.  However she wants to hold off for scrambler therapy at this time.  I does not want to proceed forward with any invasive interventional options.        Please note that this report has been produced using speech recognition software. It may contain errors related to grammar, punctuation or spelling. Electronically signed, but not reviewed.       Sourav August MD 02/20/25 3:12 PM

## 2025-02-21 ENCOUNTER — APPOINTMENT (OUTPATIENT)
Dept: PHYSICAL THERAPY | Facility: CLINIC | Age: 35
End: 2025-02-21
Payer: COMMERCIAL

## 2025-02-21 DIAGNOSIS — G89.29 CHRONIC PAIN OF RIGHT KNEE: ICD-10-CM

## 2025-02-21 DIAGNOSIS — M25.571 CHRONIC PAIN OF RIGHT ANKLE: Primary | ICD-10-CM

## 2025-02-21 DIAGNOSIS — M25.561 CHRONIC PAIN OF RIGHT KNEE: ICD-10-CM

## 2025-02-21 DIAGNOSIS — G89.29 CHRONIC PAIN OF RIGHT ANKLE: Primary | ICD-10-CM

## 2025-02-21 NOTE — PROGRESS NOTES
"Physical Therapy    Physical Therapy Treatment    Patient Name: William Abrams \"Aleksander\"  MRN: [id number]  Today's Date: 2/21/2025       Insurance:  [place]: 5 out of [place]  Authorization information: none  Insurance Type: 2025 BENEFIT / NO AUTH / NO OOP / MED NEC / [employer] MEDICA[employer]  Certification Period Start Date: 1/21/25  Certification Period End Date: 4/21/25    General:  Reason for visit: Complex regional pain syndrome type 1 of lower extremity, unspecified laterality    Referred by: Sourav August MD    Current Problem:   1. Chronic pain of right ankle        2. Chronic pain of right knee          SUBJECTIVE:   Patient reports      Precautions: PMHx considerations: Headache, difficulty walking, CRPS type 1, back pain, dizziness, Tib/fib fracture with IMN (11/19/23)   STEADI Fall Risk: 14 (score of 4+ indicates fall risk)        Pain:       OBJECTIVE:     Antalgic gait pattern    Treatments:  Therapeutic Exercise: (40 minutes)  Water level: 3ft.  Water temperature: 92 degrees    3 ft.  Walk F/B x4' ea    Noodle deep water  Hang 4'  Hip abd/add 4'  Hams 4'   Ankle pumps 4'  Bicycle 4'   Cross country ski 4'     HEP:  Access Code: [email or web address]  URL: [email or web address]  Date: 01/21/2025    ASSESSMENT:       Charges:   Aquatic Therapy: 3 unit    PLAN:  Trial of aquatic based therapy and then recheck on land to assess progress/transition to land based therapy if appropriate.     OP PT PLAN:  Treatment/Interventions: Aquatic Therapy , Education/Instruction , Gait training , Manual Therapy  , Neuromuscular re-education , Self care/home management , Therapeutic activities , and Therapeutic exercise    PT Plan: Skilled PT   PT Frequency: 1-2 times per week  Duration: 5 weeks   Certification Period Start Date: 1/21/25  Certification Period End Date: 4/21/25  Visits Approved: 2025 BENEFIT / NO AUTH / NO OOP / MED NEC / [employer] MEDICA[employer]       "

## 2025-02-27 ENCOUNTER — APPOINTMENT (OUTPATIENT)
Dept: PHYSICAL THERAPY | Facility: CLINIC | Age: 35
End: 2025-02-27
Payer: COMMERCIAL

## 2025-02-27 DIAGNOSIS — G89.29 CHRONIC PAIN OF RIGHT KNEE: ICD-10-CM

## 2025-02-27 DIAGNOSIS — M25.571 CHRONIC PAIN OF RIGHT ANKLE: Primary | ICD-10-CM

## 2025-02-27 DIAGNOSIS — M25.561 CHRONIC PAIN OF RIGHT KNEE: ICD-10-CM

## 2025-02-27 DIAGNOSIS — G89.29 CHRONIC PAIN OF RIGHT ANKLE: Primary | ICD-10-CM

## 2025-03-05 ENCOUNTER — APPOINTMENT (OUTPATIENT)
Dept: PHYSICAL THERAPY | Facility: CLINIC | Age: 35
End: 2025-03-05
Payer: COMMERCIAL

## 2025-03-05 DIAGNOSIS — G89.29 CHRONIC PAIN OF RIGHT KNEE: ICD-10-CM

## 2025-03-05 DIAGNOSIS — M25.561 CHRONIC PAIN OF RIGHT KNEE: ICD-10-CM

## 2025-03-05 DIAGNOSIS — M25.571 CHRONIC PAIN OF RIGHT ANKLE: Primary | ICD-10-CM

## 2025-03-05 DIAGNOSIS — G89.29 CHRONIC PAIN OF RIGHT ANKLE: Primary | ICD-10-CM

## 2025-03-13 ENCOUNTER — APPOINTMENT (OUTPATIENT)
Dept: PHYSICAL THERAPY | Facility: CLINIC | Age: 35
End: 2025-03-13
Payer: COMMERCIAL

## 2025-03-13 DIAGNOSIS — M25.571 CHRONIC PAIN OF RIGHT ANKLE: Primary | ICD-10-CM

## 2025-03-13 DIAGNOSIS — G89.29 CHRONIC PAIN OF RIGHT ANKLE: Primary | ICD-10-CM

## 2025-03-13 DIAGNOSIS — G89.29 CHRONIC PAIN OF RIGHT KNEE: ICD-10-CM

## 2025-03-13 DIAGNOSIS — M25.561 CHRONIC PAIN OF RIGHT KNEE: ICD-10-CM

## 2025-04-07 ENCOUNTER — OFFICE VISIT (OUTPATIENT)
Dept: PAIN MEDICINE | Facility: CLINIC | Age: 35
End: 2025-04-07
Payer: COMMERCIAL

## 2025-04-07 VITALS
SYSTOLIC BLOOD PRESSURE: 100 MMHG | HEIGHT: 63 IN | WEIGHT: 107 LBS | HEART RATE: 115 BPM | OXYGEN SATURATION: 99 % | BODY MASS INDEX: 18.96 KG/M2 | DIASTOLIC BLOOD PRESSURE: 70 MMHG | RESPIRATION RATE: 18 BRPM

## 2025-04-07 DIAGNOSIS — G90.521 COMPLEX REGIONAL PAIN SYNDROME TYPE 1 OF RIGHT LOWER EXTREMITY: Primary | ICD-10-CM

## 2025-04-07 PROCEDURE — 3008F BODY MASS INDEX DOCD: CPT | Performed by: PHYSICIAN ASSISTANT

## 2025-04-07 PROCEDURE — 99215 OFFICE O/P EST HI 40 MIN: CPT | Performed by: PHYSICIAN ASSISTANT

## 2025-04-07 ASSESSMENT — ENCOUNTER SYMPTOMS
SLEEP DISTURBANCE: 0
ARTHRALGIAS: 1
SORE THROAT: 0
CHILLS: 0
PAIN: 1
NUMBNESS: 1
CHEST TIGHTNESS: 0
DIARRHEA: 0
VOMITING: 0
NAUSEA: 0
COUGH: 0
FEVER: 0
FATIGUE: 0
PALPITATIONS: 0
UNEXPECTED WEIGHT CHANGE: 0
WEAKNESS: 1
SHORTNESS OF BREATH: 0
ACTIVITY CHANGE: 0
VOICE CHANGE: 0

## 2025-04-07 ASSESSMENT — PAIN DESCRIPTION - DESCRIPTORS: DESCRIPTORS: BURNING;NUMBNESS;SHARP

## 2025-04-07 ASSESSMENT — LIFESTYLE VARIABLES
SKIP TO QUESTIONS 9-10: 1
HOW OFTEN DO YOU HAVE A DRINK CONTAINING ALCOHOL: NEVER
AUDIT-C TOTAL SCORE: 0
HOW MANY STANDARD DRINKS CONTAINING ALCOHOL DO YOU HAVE ON A TYPICAL DAY: PATIENT DOES NOT DRINK
HOW OFTEN DO YOU HAVE SIX OR MORE DRINKS ON ONE OCCASION: NEVER

## 2025-04-07 ASSESSMENT — PAIN SCALES - GENERAL
PAINLEVEL_OUTOF10: 9
PAINLEVEL_OUTOF10: 9

## 2025-04-07 ASSESSMENT — PATIENT HEALTH QUESTIONNAIRE - PHQ9
2. FEELING DOWN, DEPRESSED OR HOPELESS: NOT AT ALL
1. LITTLE INTEREST OR PLEASURE IN DOING THINGS: NOT AT ALL
SUM OF ALL RESPONSES TO PHQ9 QUESTIONS 1 & 2: 0

## 2025-04-07 ASSESSMENT — PAIN - FUNCTIONAL ASSESSMENT: PAIN_FUNCTIONAL_ASSESSMENT: 0-10

## 2025-04-07 NOTE — PROGRESS NOTES
"Subjective   Patient ID: William Abrams \"Aleksander\" is a 34 y.o. female who presents for Pain (Scrambler  therapy).  Is a 34-year-old female with a history of CRPS in the right lower extremity.  Patient continues to have neuropathic symptoms in a stocking glove pattern knee down.  Associated with pain numbness burning aggravation with use ambulation and walking patient continues to use bracing that does seem to provide some benefit she did notes that she has stopped gabapentin she is still taking amitriptyline 100 mg twice daily    Patient is rating her pain a 9 out of 10 this is been aggravated due to her driving.  Continued burning numbness tingling and pain in the right lower extremity    Pain  Associated symptoms include weakness. Pertinent negatives include no chest pain, diarrhea, fatigue, fever, nausea, shortness of breath or vomiting.       Review of Systems   Constitutional:  Negative for activity change, chills, fatigue, fever and unexpected weight change.   HENT:  Negative for ear pain, sore throat and voice change.    Eyes:  Negative for visual disturbance.   Respiratory:  Negative for cough, chest tightness and shortness of breath.    Cardiovascular:  Negative for chest pain and palpitations.   Gastrointestinal:  Negative for diarrhea, nausea and vomiting.   Musculoskeletal:  Positive for arthralgias and gait problem.   Neurological:  Positive for weakness and numbness.   Psychiatric/Behavioral:  Negative for behavioral problems, self-injury, sleep disturbance and suicidal ideas.        Objective   Physical Exam  Musculoskeletal:        Legs:        Assessment/Plan   Problem List Items Addressed This Visit             ICD-10-CM    Complex regional pain syndrome type 1 - Primary G90.50     TREATMENT PLAN:  Day [ 1 ] of Scrambler Therapy.   Verbal consent obtained.  Session initiated by myself with proper electrode/lead placement to applicable dermatomes.   Patient tolerated treatment well .  Encouraged " patient to keep a diary or log of symptoms to monitor for any changes in pain, sensation, etc.    Recommend follow-up as needed.       PROCEDURE NOTE:  Treatment [ 1 ] with Scrambler therapy was initiated by myself.   Verbal consent obtained from patient.  Therapy start time: [ 256  ]   Leads were applied to: [ RLE L4 L5 and S1 ]  therapeutic dose :  [  1500  ]    Patient tolerated treatment [  well ] .  Pain reported at start of therapy session was about a [  9/10 ].  Pain reported at end of therapy session;  [ slight improvement ] .  Leads were removed and patient left the office without any difficulty.  60 min total scrambler treatment time.       We did discuss that the amitriptyline will need to get titrated down and like to have patient utilize 1 tablet/day this may affect treatment patient is well aware.  Leonardo Yadav PA-C 04/07/25 3:04 PM

## 2025-04-08 ENCOUNTER — OFFICE VISIT (OUTPATIENT)
Dept: PAIN MEDICINE | Facility: CLINIC | Age: 35
End: 2025-04-08
Payer: COMMERCIAL

## 2025-04-08 VITALS
HEART RATE: 115 BPM | DIASTOLIC BLOOD PRESSURE: 68 MMHG | BODY MASS INDEX: 18.96 KG/M2 | RESPIRATION RATE: 18 BRPM | HEIGHT: 63 IN | WEIGHT: 107 LBS | SYSTOLIC BLOOD PRESSURE: 94 MMHG | OXYGEN SATURATION: 100 %

## 2025-04-08 DIAGNOSIS — G90.521 COMPLEX REGIONAL PAIN SYNDROME TYPE 1 OF RIGHT LOWER EXTREMITY: Primary | ICD-10-CM

## 2025-04-08 PROCEDURE — 3008F BODY MASS INDEX DOCD: CPT | Performed by: PHYSICIAN ASSISTANT

## 2025-04-08 PROCEDURE — 99215 OFFICE O/P EST HI 40 MIN: CPT | Performed by: PHYSICIAN ASSISTANT

## 2025-04-08 ASSESSMENT — PATIENT HEALTH QUESTIONNAIRE - PHQ9
SUM OF ALL RESPONSES TO PHQ9 QUESTIONS 1 & 2: 0
2. FEELING DOWN, DEPRESSED OR HOPELESS: NOT AT ALL
1. LITTLE INTEREST OR PLEASURE IN DOING THINGS: NOT AT ALL

## 2025-04-08 ASSESSMENT — PAIN SCALES - GENERAL
PAINLEVEL_OUTOF10: 8
PAINLEVEL_OUTOF10: 8

## 2025-04-08 ASSESSMENT — ENCOUNTER SYMPTOMS
DIARRHEA: 0
SHORTNESS OF BREATH: 0
WEAKNESS: 1
VOMITING: 0
FEVER: 0
PAIN: 1
FATIGUE: 0
NAUSEA: 0

## 2025-04-08 ASSESSMENT — LIFESTYLE VARIABLES
AUDIT-C TOTAL SCORE: 0
HOW OFTEN DO YOU HAVE A DRINK CONTAINING ALCOHOL: NEVER
HOW OFTEN DO YOU HAVE SIX OR MORE DRINKS ON ONE OCCASION: NEVER
HOW MANY STANDARD DRINKS CONTAINING ALCOHOL DO YOU HAVE ON A TYPICAL DAY: PATIENT DOES NOT DRINK
SKIP TO QUESTIONS 9-10: 1

## 2025-04-08 ASSESSMENT — PAIN - FUNCTIONAL ASSESSMENT: PAIN_FUNCTIONAL_ASSESSMENT: 0-10

## 2025-04-08 ASSESSMENT — PAIN DESCRIPTION - DESCRIPTORS: DESCRIPTORS: ACHING;THROBBING

## 2025-04-08 NOTE — PROGRESS NOTES
"Subjective   Patient ID: William Abrams \"Aleksander\" is a 34 y.o. female who presents for Pain (Scrambler therapy ).  Is a 34-year-old female with a history of CRPS in the right lower extremity.  Patient continues to have neuropathic symptoms in a stocking glove pattern knee down.  Associated with pain numbness burning aggravation with use ambulation and walking patient continues to use bracing that does seem to provide some benefit she did notes that she has stopped gabapentin she is still taking amitriptyline 100 mg twice daily    Patient is rating her pain a 8 out of 10 this is been aggravated due to her driving.  Continued burning numbness tingling and pain in the right lower extremity. She reported that she has about 30 minutes of decent relief post treatment yesterday.     Pain  Associated symptoms include weakness. Pertinent negatives include no chest pain, diarrhea, fatigue, fever, nausea, shortness of breath or vomiting.       Review of Systems   Constitutional:  Negative for fatigue and fever.   Respiratory:  Negative for shortness of breath.    Cardiovascular:  Negative for chest pain.   Gastrointestinal:  Negative for diarrhea, nausea and vomiting.   Neurological:  Positive for weakness.       Objective   Physical Exam  Musculoskeletal:        Legs:          Assessment/Plan   Problem List Items Addressed This Visit    None  TREATMENT PLAN:  Day [ 2 ] of Scrambler Therapy.   Verbal consent obtained.  Session initiated by myself with proper electrode/lead placement to applicable dermatomes.   Patient tolerated treatment well .  Encouraged patient to keep a diary or log of symptoms to monitor for any changes in pain, sensation, etc.    Recommend follow-up as needed.       PROCEDURE NOTE:  Treatment [ 2 ] with Scrambler therapy was initiated by myself.   Verbal consent obtained from patient.  Therapy start time: [ 245  ]   Leads were applied to: [ RLE L4 L5 and S1 ]  therapeutic dose :  [  1400  ]     Patient " tolerated treatment [  well ] .  Pain reported at start of therapy session was about a [  8/10 ].  Pain reported at end of therapy session;  [ 7/10] .  Leads were removed and patient left the office without any difficulty.  60 min total scrambler treatment time.         Leonardo Yadav PA-C 04/08/25 2:55 PM    25-Aug-2021 23:31

## 2025-04-09 ENCOUNTER — OFFICE VISIT (OUTPATIENT)
Dept: PAIN MEDICINE | Facility: CLINIC | Age: 35
End: 2025-04-09
Payer: COMMERCIAL

## 2025-04-09 VITALS
DIASTOLIC BLOOD PRESSURE: 60 MMHG | HEIGHT: 63 IN | WEIGHT: 107 LBS | SYSTOLIC BLOOD PRESSURE: 100 MMHG | HEART RATE: 107 BPM | BODY MASS INDEX: 18.96 KG/M2 | RESPIRATION RATE: 18 BRPM | OXYGEN SATURATION: 99 %

## 2025-04-09 DIAGNOSIS — G90.521 COMPLEX REGIONAL PAIN SYNDROME TYPE 1 OF RIGHT LOWER EXTREMITY: Primary | ICD-10-CM

## 2025-04-09 PROCEDURE — 3008F BODY MASS INDEX DOCD: CPT | Performed by: PHYSICIAN ASSISTANT

## 2025-04-09 PROCEDURE — 99214 OFFICE O/P EST MOD 30 MIN: CPT | Performed by: PHYSICIAN ASSISTANT

## 2025-04-09 ASSESSMENT — LIFESTYLE VARIABLES
HOW OFTEN DO YOU HAVE SIX OR MORE DRINKS ON ONE OCCASION: NEVER
SKIP TO QUESTIONS 9-10: 1
AUDIT-C TOTAL SCORE: 0
HOW OFTEN DO YOU HAVE A DRINK CONTAINING ALCOHOL: NEVER
HOW MANY STANDARD DRINKS CONTAINING ALCOHOL DO YOU HAVE ON A TYPICAL DAY: PATIENT DOES NOT DRINK

## 2025-04-09 ASSESSMENT — ENCOUNTER SYMPTOMS
FEVER: 0
WEAKNESS: 1
PAIN: 1
NAUSEA: 0
FATIGUE: 0
VOMITING: 0
DIARRHEA: 0
SHORTNESS OF BREATH: 0

## 2025-04-09 ASSESSMENT — PAIN - FUNCTIONAL ASSESSMENT: PAIN_FUNCTIONAL_ASSESSMENT: 0-10

## 2025-04-09 ASSESSMENT — PAIN SCALES - GENERAL
PAINLEVEL_OUTOF10: 9
PAINLEVEL_OUTOF10: 9

## 2025-04-09 ASSESSMENT — PAIN DESCRIPTION - DESCRIPTORS: DESCRIPTORS: BURNING

## 2025-04-09 NOTE — PROGRESS NOTES
"Subjective   Patient ID: William Abrams \"Aleksander\" is a 34 y.o. female who presents for Pain (Scrambler therapy).  Is a 34-year-old female with a history of CRPS in the right lower extremity.  Patient continues to have neuropathic symptoms in a stocking glove pattern knee down.  Associated with pain numbness burning aggravation with use ambulation and walking patient continues to use bracing that does seem to provide some benefit she did notes that she has stopped gabapentin she is still taking amitriptyline 100 mg twice daily    Patient is rating her pain a 9 out of 10. She feel the neur symptoms are aggravated but she is not sure why.     Pain  Associated symptoms include weakness. Pertinent negatives include no chest pain, diarrhea, fatigue, fever, nausea, shortness of breath or vomiting.       Review of Systems   Constitutional:  Negative for fatigue and fever.   Respiratory:  Negative for shortness of breath.    Cardiovascular:  Negative for chest pain.   Gastrointestinal:  Negative for diarrhea, nausea and vomiting.   Neurological:  Positive for weakness.       Objective   Physical Exam    Assessment/Plan   Problem List Items Addressed This Visit             ICD-10-CM    Complex regional pain syndrome type 1 - Primary G90.50   TREATMENT PLAN:  Day [ 3 ] of Scrambler Therapy.   Verbal consent obtained.  Session initiated by myself with proper electrode/lead placement to applicable dermatomes.   Patient tolerated treatment well .  Encouraged patient to keep a diary or log of symptoms to monitor for any changes in pain, sensation, etc.    Recommend follow-up as needed.       PROCEDURE NOTE:  Treatment [ 3 ] with Scrambler therapy was initiated by myself.   Verbal consent obtained from patient.  Therapy start time: [ 239  ]   Leads were applied to: [ RLE L4 L5 and S1 ]  therapeutic dose :  [  1200  ]     Patient tolerated treatment [  well ] .  Pain reported at start of therapy session was about a [  9/10 ].  Pain " reported at end of therapy session;  [ 8/10] .  Leads were removed and patient left the office without any difficulty.  40 min total scrambler treatment time. Pt had a call that required her to leave the session.          Leonardo Yadav PA-C 04/09/25 3:22 PM

## 2025-04-10 ENCOUNTER — APPOINTMENT (OUTPATIENT)
Dept: PHYSICAL THERAPY | Facility: CLINIC | Age: 35
End: 2025-04-10
Payer: COMMERCIAL

## 2025-04-10 ENCOUNTER — OFFICE VISIT (OUTPATIENT)
Dept: PAIN MEDICINE | Facility: CLINIC | Age: 35
End: 2025-04-10
Payer: COMMERCIAL

## 2025-04-10 VITALS
DIASTOLIC BLOOD PRESSURE: 60 MMHG | RESPIRATION RATE: 18 BRPM | BODY MASS INDEX: 18.96 KG/M2 | OXYGEN SATURATION: 97 % | HEIGHT: 63 IN | WEIGHT: 107 LBS | SYSTOLIC BLOOD PRESSURE: 98 MMHG | HEART RATE: 109 BPM

## 2025-04-10 DIAGNOSIS — G89.29 CHRONIC PAIN OF RIGHT ANKLE: Primary | ICD-10-CM

## 2025-04-10 DIAGNOSIS — G90.521 COMPLEX REGIONAL PAIN SYNDROME TYPE 1 OF RIGHT LOWER EXTREMITY: Primary | ICD-10-CM

## 2025-04-10 DIAGNOSIS — M25.561 CHRONIC PAIN OF RIGHT KNEE: ICD-10-CM

## 2025-04-10 DIAGNOSIS — G89.29 CHRONIC PAIN OF RIGHT KNEE: ICD-10-CM

## 2025-04-10 DIAGNOSIS — M25.571 CHRONIC PAIN OF RIGHT ANKLE: Primary | ICD-10-CM

## 2025-04-10 PROCEDURE — 99215 OFFICE O/P EST HI 40 MIN: CPT | Performed by: PHYSICIAN ASSISTANT

## 2025-04-10 PROCEDURE — 3008F BODY MASS INDEX DOCD: CPT | Performed by: PHYSICIAN ASSISTANT

## 2025-04-10 ASSESSMENT — ENCOUNTER SYMPTOMS
NAUSEA: 0
SHORTNESS OF BREATH: 0
VOMITING: 0
FEVER: 0
FATIGUE: 0
WEAKNESS: 1
DIARRHEA: 0
PAIN: 1

## 2025-04-10 ASSESSMENT — PAIN SCALES - GENERAL
PAINLEVEL_OUTOF10: 8
PAINLEVEL_OUTOF10: 8

## 2025-04-10 ASSESSMENT — LIFESTYLE VARIABLES
HOW OFTEN DO YOU HAVE A DRINK CONTAINING ALCOHOL: NEVER
HOW OFTEN DO YOU HAVE SIX OR MORE DRINKS ON ONE OCCASION: NEVER
AUDIT-C TOTAL SCORE: 0
SKIP TO QUESTIONS 9-10: 1
HOW MANY STANDARD DRINKS CONTAINING ALCOHOL DO YOU HAVE ON A TYPICAL DAY: PATIENT DOES NOT DRINK

## 2025-04-10 ASSESSMENT — PAIN - FUNCTIONAL ASSESSMENT: PAIN_FUNCTIONAL_ASSESSMENT: 0-10

## 2025-04-10 NOTE — PROGRESS NOTES
"Subjective   Patient ID: William Abrams \"Aleksander\" is a 34 y.o. female who presents for Pain (Scrambler therapy ).  Is a 34-year-old female with a history of CRPS in the right lower extremity.  Patient continues to have neuropathic symptoms in a stocking glove pattern knee down.  Associated with pain numbness burning aggravation with use ambulation and walking patient continues to use bracing that does seem to provide some benefit she did notes that she has stopped gabapentin she is still taking amitriptyline 100 mg twice daily    Patient is rating her pain a 8 out of 10. Distal wound region is increased in achy pains. She has PT later today.     Pain  Associated symptoms include weakness. Pertinent negatives include no chest pain, diarrhea, fatigue, fever, nausea, shortness of breath or vomiting.       Review of Systems   Constitutional:  Negative for fatigue and fever.   Respiratory:  Negative for shortness of breath.    Cardiovascular:  Negative for chest pain.   Gastrointestinal:  Negative for diarrhea, nausea and vomiting.   Neurological:  Positive for weakness.       Objective   Physical Exam  Musculoskeletal:        Legs:          Assessment/Plan   Problem List Items Addressed This Visit             ICD-10-CM    Complex regional pain syndrome type 1 - Primary G90.50   Day [ 4 ] of Scrambler Therapy.   Verbal consent obtained.  Session initiated by myself with proper electrode/lead placement to applicable dermatomes.   Patient tolerated treatment well .  Encouraged patient to keep a diary or log of symptoms to monitor for any changes in pain, sensation, etc.    Recommend follow-up as needed.       PROCEDURE NOTE:  Treatment [ 4 ] with Scrambler therapy was initiated by myself.   Verbal consent obtained from patient.  Therapy start time: [ 111  ]   Leads were applied to: [ RLE L4 L5 and S1 ]  therapeutic dose :  [  1400  ]     Patient tolerated treatment [  well ] .  Pain reported at start of therapy session was " about a [  8/10 ].  Pain reported at end of therapy session;  [ no reporting ] .  Leads were removed and patient left the office without any difficulty.  60 min total scrambler treatment time.         eLonardo Yadav PA-C 04/11/25 2:50 PM

## 2025-04-11 ENCOUNTER — OFFICE VISIT (OUTPATIENT)
Dept: PAIN MEDICINE | Facility: CLINIC | Age: 35
End: 2025-04-11
Payer: COMMERCIAL

## 2025-04-11 VITALS
HEIGHT: 63 IN | DIASTOLIC BLOOD PRESSURE: 70 MMHG | RESPIRATION RATE: 18 BRPM | HEART RATE: 106 BPM | BODY MASS INDEX: 18.96 KG/M2 | WEIGHT: 107 LBS | SYSTOLIC BLOOD PRESSURE: 100 MMHG | OXYGEN SATURATION: 98 %

## 2025-04-11 DIAGNOSIS — G90.521 COMPLEX REGIONAL PAIN SYNDROME TYPE 1 OF RIGHT LOWER EXTREMITY: Primary | ICD-10-CM

## 2025-04-11 PROCEDURE — 99214 OFFICE O/P EST MOD 30 MIN: CPT | Performed by: PHYSICIAN ASSISTANT

## 2025-04-11 PROCEDURE — 3008F BODY MASS INDEX DOCD: CPT | Performed by: PHYSICIAN ASSISTANT

## 2025-04-11 ASSESSMENT — ENCOUNTER SYMPTOMS
VOMITING: 0
NAUSEA: 0
PAIN: 1
FEVER: 0
DIARRHEA: 0
SHORTNESS OF BREATH: 0
WEAKNESS: 1
FATIGUE: 0

## 2025-04-11 ASSESSMENT — PAIN - FUNCTIONAL ASSESSMENT: PAIN_FUNCTIONAL_ASSESSMENT: 0-10

## 2025-04-11 ASSESSMENT — PAIN SCALES - GENERAL
PAINLEVEL_OUTOF10: 7
PAINLEVEL_OUTOF10: 7

## 2025-04-11 ASSESSMENT — PAIN DESCRIPTION - DESCRIPTORS: DESCRIPTORS: ACHING

## 2025-04-11 NOTE — PROGRESS NOTES
"Subjective   Patient ID: William Abrams \"Aleksander\" is a 34 y.o. female who presents for Pain (Scrambler therapy).  Is a 34-year-old female with a history of CRPS in the right lower extremity.  Patient continues to have neuropathic symptoms in a stocking glove pattern knee down.  Associated with pain numbness burning aggravation with use ambulation and walking patient continues to use bracing that does seem to provide some benefit she did notes that she has stopped gabapentin she is still taking amitriptyline 100 mg twice daily    Patient is rating her pain a 7 out of 10. Pt PT order was told  for aquatic PT.     Pain  Associated symptoms include weakness. Pertinent negatives include no chest pain, diarrhea, fatigue, fever, nausea, shortness of breath or vomiting.       Review of Systems   Constitutional:  Negative for fatigue and fever.   Respiratory:  Negative for shortness of breath.    Cardiovascular:  Negative for chest pain.   Gastrointestinal:  Negative for diarrhea, nausea and vomiting.   Neurological:  Positive for weakness.       Objective   Physical Exam  Musculoskeletal:        Legs:        Assessment/Plan   Problem List Items Addressed This Visit             ICD-10-CM    Complex regional pain syndrome type 1 - Primary G90.50   TREATMENT PLAN:  Day [ 5 ] of Scrambler Therapy.   Verbal consent obtained.  Session initiated by myself with proper electrode/lead placement to applicable dermatomes.   Patient tolerated treatment well .  Encouraged patient to keep a diary or log of symptoms to monitor for any changes in pain, sensation, etc.    Recommend follow-up as needed.       PROCEDURE NOTE:  Treatment [ 5 ] with Scrambler therapy was initiated by myself.   Verbal consent obtained from patient.  Therapy start time: [ 241  ]   Leads were applied to: [ L4 L5 And S1 RLE ]  therapeutic dose :  [  1200  ]    Patient tolerated treatment [ moderately   ] .  Pain reported at start of therapy session was about a " [ 7 /10 ].  Pain reported at end of therapy session;  [  no reported changes. ] .  Leads were removed and patient left the office without any difficulty.  60 min total scrambler treatment time.           Leonardo Yadav PA-C 04/11/25 2:42 PM

## 2025-04-14 ENCOUNTER — OFFICE VISIT (OUTPATIENT)
Dept: PAIN MEDICINE | Facility: CLINIC | Age: 35
End: 2025-04-14
Payer: COMMERCIAL

## 2025-04-14 DIAGNOSIS — G90.521 COMPLEX REGIONAL PAIN SYNDROME TYPE 1 OF RIGHT LOWER EXTREMITY: Primary | ICD-10-CM

## 2025-04-14 PROCEDURE — 99214 OFFICE O/P EST MOD 30 MIN: CPT | Performed by: PHYSICIAN ASSISTANT

## 2025-04-14 ASSESSMENT — ENCOUNTER SYMPTOMS
FATIGUE: 0
NAUSEA: 0
VOMITING: 0
WEAKNESS: 1
SHORTNESS OF BREATH: 0
DIARRHEA: 0
FEVER: 0

## 2025-04-14 NOTE — PROGRESS NOTES
"Subjective   Patient ID: William Abrams \"Aleksander\" is a 34 y.o. female who presents for No chief complaint on file..  Patient is a 34-year-old female here today and states that her Friday sessions of scrambler therapy extremely exacerbated her condition.  States that it she only wants to do 30 minutes of therapy today.  He started aquatic therapy.  She is also needing a repeat of her functional capacity exam for her disability claim.         Review of Systems   Constitutional:  Negative for fatigue and fever.   Respiratory:  Negative for shortness of breath.    Cardiovascular:  Negative for chest pain.   Gastrointestinal:  Negative for diarrhea, nausea and vomiting.   Neurological:  Positive for weakness.       Objective   Physical Exam  Vitals reviewed.   Constitutional:       Appearance: Normal appearance.   HENT:      Head: Normocephalic and atraumatic.      Mouth/Throat:      Mouth: Mucous membranes are moist.   Neck:      Vascular: No JVD.   Pulmonary:      Effort: Pulmonary effort is normal. No tachypnea or bradypnea.   Abdominal:      Palpations: Abdomen is soft.   Skin:     General: Skin is warm and dry.   Neurological:      Mental Status: She is alert and oriented to person, place, and time.   Psychiatric:         Mood and Affect: Mood normal.         Behavior: Behavior normal. Behavior is cooperative.         Assessment/Plan   Problem List Items Addressed This Visit             ICD-10-CM    Complex regional pain syndrome type 1 - Primary G90.50    Relevant Orders    Referral to Physical Therapy     Provide patient with a functional capacity exam I am going to have her refer back to my supervising physician for evaluation for to for potential other interventional options including DRG or other stimulation.  At this point continue with aquatic therapy scrambler therapy seems to be aggravating and exacerbating patient's pain condition we are going to discontinue treatment at this time.        Leonardo SHIRLEY" ALOK Yadav 04/14/25 2:56 PM

## 2025-04-15 ENCOUNTER — APPOINTMENT (OUTPATIENT)
Dept: PAIN MEDICINE | Facility: CLINIC | Age: 35
End: 2025-04-15
Payer: COMMERCIAL

## 2025-04-16 ENCOUNTER — APPOINTMENT (OUTPATIENT)
Dept: PAIN MEDICINE | Facility: CLINIC | Age: 35
End: 2025-04-16
Payer: COMMERCIAL

## 2025-04-17 ENCOUNTER — APPOINTMENT (OUTPATIENT)
Dept: PAIN MEDICINE | Facility: CLINIC | Age: 35
End: 2025-04-17
Payer: COMMERCIAL

## 2025-04-18 ENCOUNTER — APPOINTMENT (OUTPATIENT)
Dept: PAIN MEDICINE | Facility: CLINIC | Age: 35
End: 2025-04-18
Payer: COMMERCIAL

## 2025-04-21 ENCOUNTER — OFFICE VISIT (OUTPATIENT)
Dept: PAIN MEDICINE | Facility: CLINIC | Age: 35
End: 2025-04-21
Payer: COMMERCIAL

## 2025-04-21 VITALS
WEIGHT: 107 LBS | SYSTOLIC BLOOD PRESSURE: 110 MMHG | HEIGHT: 63 IN | RESPIRATION RATE: 16 BRPM | BODY MASS INDEX: 18.96 KG/M2 | OXYGEN SATURATION: 98 % | HEART RATE: 109 BPM | DIASTOLIC BLOOD PRESSURE: 68 MMHG

## 2025-04-21 DIAGNOSIS — S82.201S FRACTURE OF TIBIA WITH FIBULA, RIGHT, OPEN, SEQUELA: ICD-10-CM

## 2025-04-21 DIAGNOSIS — M79.672 PAIN IN BOTH FEET: ICD-10-CM

## 2025-04-21 DIAGNOSIS — S82.401S FRACTURE OF TIBIA WITH FIBULA, RIGHT, OPEN, SEQUELA: ICD-10-CM

## 2025-04-21 DIAGNOSIS — M79.671 PAIN IN BOTH FEET: ICD-10-CM

## 2025-04-21 DIAGNOSIS — G90.50 COMPLEX REGIONAL PAIN SYNDROME TYPE 1, AFFECTING UNSPECIFIED SITE: ICD-10-CM

## 2025-04-21 DIAGNOSIS — G90.521 COMPLEX REGIONAL PAIN SYNDROME TYPE 1 OF RIGHT LOWER EXTREMITY: Primary | ICD-10-CM

## 2025-04-21 PROCEDURE — 99214 OFFICE O/P EST MOD 30 MIN: CPT | Performed by: ANESTHESIOLOGY

## 2025-04-21 PROCEDURE — 3008F BODY MASS INDEX DOCD: CPT | Performed by: ANESTHESIOLOGY

## 2025-04-21 RX ORDER — TOPIRAMATE 100 MG/1
100 TABLET, FILM COATED ORAL 2 TIMES DAILY
Qty: 60 TABLET | Refills: 11 | Status: SHIPPED | OUTPATIENT
Start: 2025-04-21 | End: 2026-04-21

## 2025-04-21 RX ORDER — GABAPENTIN 800 MG/1
800 TABLET ORAL 3 TIMES DAILY
Qty: 90 TABLET | Refills: 2 | Status: SHIPPED | OUTPATIENT
Start: 2025-04-21 | End: 2026-04-21

## 2025-04-21 RX ORDER — AMITRIPTYLINE HYDROCHLORIDE 10 MG/1
10 TABLET, FILM COATED ORAL NIGHTLY
Qty: 90 TABLET | Refills: 1 | Status: SHIPPED | OUTPATIENT
Start: 2025-04-21 | End: 2025-08-19

## 2025-04-21 ASSESSMENT — ENCOUNTER SYMPTOMS
GASTROINTESTINAL NEGATIVE: 1
NUMBNESS: 1
EYES NEGATIVE: 1
CONSTITUTIONAL NEGATIVE: 1
LEG PAIN: 1
JOINT SWELLING: 1
CARDIOVASCULAR NEGATIVE: 1
HEMATOLOGIC/LYMPHATIC NEGATIVE: 1
PSYCHIATRIC NEGATIVE: 1
WEAKNESS: 1
ALLERGIC/IMMUNOLOGIC NEGATIVE: 1
RESPIRATORY NEGATIVE: 1
ENDOCRINE NEGATIVE: 1

## 2025-04-21 ASSESSMENT — PATIENT HEALTH QUESTIONNAIRE - PHQ9
SUM OF ALL RESPONSES TO PHQ9 QUESTIONS 1 AND 2: 0
1. LITTLE INTEREST OR PLEASURE IN DOING THINGS: NOT AT ALL
2. FEELING DOWN, DEPRESSED OR HOPELESS: NOT AT ALL

## 2025-04-21 ASSESSMENT — PAIN SCALES - GENERAL
PAINLEVEL_OUTOF10: 8
PAINLEVEL_OUTOF10: 8

## 2025-04-21 ASSESSMENT — PAIN - FUNCTIONAL ASSESSMENT: PAIN_FUNCTIONAL_ASSESSMENT: 0-10

## 2025-04-21 ASSESSMENT — PAIN DESCRIPTION - DESCRIPTORS: DESCRIPTORS: BURNING;SHARP;NUMBNESS;THROBBING

## 2025-04-21 NOTE — PROGRESS NOTES
"Subjective   Patient ID: William Abrams \"Millicent" is a 34 y.o. female who presents for Leg Pain.  Leg Pain   Associated symptoms include numbness.   Patient here today for follow-up and management of her right lower extremity complex regional pain syndrome.  She underwent a week of treatments having scrambler therapy but had exacerbation of her symptoms and stopped the treatment.  She is here today to follow-up what the neck steps would be.  On top of her severe right foot pain she is also getting left foot pain she thinks because of compensation.  She is a very hard time walking she does wear a knee brace.  She is a hard time driving.  She is unable to work.  She would like to know what other options there are from least invasive to most invasive.  She also would like a consultation on both of her feet with a new foot doctor.  She reports that the last foot doctor kind of told her she did not know what to do which is how she got to pain management.  But she would like to know if there is anything structurally going wrong with her feet.  She does get significant nerve pain in the right leg and knee, skin sensitivity, swelling, skin temperature changes.  She does need refills of her topiramate, gabapentin, amitriptyline.    Review of Systems   Constitutional: Negative.    HENT: Negative.     Eyes: Negative.    Respiratory: Negative.     Cardiovascular: Negative.    Gastrointestinal: Negative.    Endocrine: Negative.    Genitourinary: Negative.    Musculoskeletal:  Positive for gait problem and joint swelling.   Skin: Negative.    Allergic/Immunologic: Negative.    Neurological:  Positive for weakness and numbness.   Hematological: Negative.    Psychiatric/Behavioral: Negative.         Objective   Physical Exam  Vitals and nursing note reviewed.   Constitutional:       Appearance: Normal appearance.   HENT:      Head: Normocephalic and atraumatic.      Right Ear: Ear canal and external ear normal.      Left Ear: Ear " canal and external ear normal.      Nose: Nose normal.      Mouth/Throat:      Mouth: Mucous membranes are moist.      Pharynx: Oropharynx is clear.   Eyes:      Conjunctiva/sclera: Conjunctivae normal.      Pupils: Pupils are equal, round, and reactive to light.   Cardiovascular:      Rate and Rhythm: Normal rate.   Pulmonary:      Effort: Pulmonary effort is normal. No respiratory distress.   Musculoskeletal:      Cervical back: Normal range of motion and neck supple.      Lumbar back: Tenderness present. Normal range of motion.      Right ankle: Swelling present. Decreased range of motion.      Right foot: Normal range of motion. No deformity.      Left foot: Decreased range of motion. No deformity.        Legs:         Feet:       Comments: Patient initially had unloading brace on right knee.  As well as Velcro ankle support.  Has minimal swelling around the ankle.  Allodynia noted around the ankle and dorsal of the foot.  There is swelling as well.   Skin:     General: Skin is warm and dry.   Neurological:      Mental Status: She is alert.   Psychiatric:         Mood and Affect: Mood normal.         Thought Content: Thought content normal.         Assessment/Plan   Problem List Items Addressed This Visit           ICD-10-CM    Complex regional pain syndrome type 1 - Primary G90.50    Relevant Medications    amitriptyline (Elavil) 10 mg tablet    topiramate (Topamax) 100 mg tablet    Other Relevant Orders    [facility]    Referral to Podiatry    [facility]    Referral to Podiatry     Other Visit Diagnoses         Codes      Fracture of tibia with fibula, right, open, sequela     S82.201S, S82.401S    Relevant Medications    gabapentin (Neurontin) 800 mg tablet    Other Relevant Orders    Referral to Podiatry      Pain in both feet     M79.671, M79.672    Relevant Orders    Referral to Podiatry             I nice discussion with the patient today our plan will  be as follows.    Radiology: All available imaging was reviewed today.    Physically: Patient should continue home physical therapy exercises for complex regional pain syndrome and desensitization.    Psychologically: Nothing at this time    Medication: Gabapentin, topiramate, amitriptyline all refilled today.  A PDMP report was checked today, and was consistent with reported prescribing.    Duration: Greater than 1 year    Intervention: Patient would like to continue to stay conservative and would like referral for ketamine infusions.  I will send a referral over to the [facility] in [place].    I will also refer the patient to Dr. Leyva of [facility] for consultation for her bilateral foot pain.        Please note that this report has been produced using speech recognition software. It may contain errors related to grammar, punctuation or spelling. Electronically signed, but not reviewed.       Sourav August MD 04/21/25 11:08 AM

## 2025-04-24 ENCOUNTER — DOCUMENTATION (OUTPATIENT)
Dept: PHYSICAL THERAPY | Facility: CLINIC | Age: 35
End: 2025-04-24
Payer: COMMERCIAL

## 2025-04-24 DIAGNOSIS — M25.571 CHRONIC PAIN OF RIGHT ANKLE: Primary | ICD-10-CM

## 2025-04-24 DIAGNOSIS — M25.561 CHRONIC PAIN OF RIGHT KNEE: ICD-10-CM

## 2025-04-24 DIAGNOSIS — G89.29 CHRONIC PAIN OF RIGHT KNEE: ICD-10-CM

## 2025-04-24 DIAGNOSIS — G89.29 CHRONIC PAIN OF RIGHT ANKLE: Primary | ICD-10-CM

## 2025-04-29 ENCOUNTER — OFFICE VISIT (OUTPATIENT)
Dept: PAIN MEDICINE | Facility: CLINIC | Age: 35
End: 2025-04-29
Payer: COMMERCIAL

## 2025-04-29 VITALS
TEMPERATURE: 98.6 F | BODY MASS INDEX: 20.38 KG/M2 | HEART RATE: 93 BPM | OXYGEN SATURATION: 98 % | RESPIRATION RATE: 18 BRPM | WEIGHT: 115 LBS | SYSTOLIC BLOOD PRESSURE: 114 MMHG | HEIGHT: 63 IN | DIASTOLIC BLOOD PRESSURE: 56 MMHG

## 2025-04-29 DIAGNOSIS — G90.521 COMPLEX REGIONAL PAIN SYNDROME TYPE 1 OF RIGHT LOWER EXTREMITY: Primary | ICD-10-CM

## 2025-04-29 DIAGNOSIS — G90.50 COMPLEX REGIONAL PAIN SYNDROME TYPE 1, AFFECTING UNSPECIFIED SITE: ICD-10-CM

## 2025-04-29 PROCEDURE — 3008F BODY MASS INDEX DOCD: CPT | Performed by: ANESTHESIOLOGY

## 2025-04-29 PROCEDURE — 99214 OFFICE O/P EST MOD 30 MIN: CPT | Performed by: ANESTHESIOLOGY

## 2025-04-29 RX ORDER — ALBUTEROL SULFATE 0.83 MG/ML
3 SOLUTION RESPIRATORY (INHALATION) AS NEEDED
OUTPATIENT
Start: 2025-04-29

## 2025-04-29 RX ORDER — NITROGLYCERIN 0.4 MG/1
0.4 TABLET SUBLINGUAL ONCE
OUTPATIENT
Start: 2025-04-29 | End: 2025-04-29

## 2025-04-29 RX ORDER — METOPROLOL TARTRATE 25 MG/1
25 TABLET, FILM COATED ORAL ONCE
OUTPATIENT
Start: 2025-04-29 | End: 2025-04-29

## 2025-04-29 RX ORDER — FAMOTIDINE 10 MG/ML
20 INJECTION, SOLUTION INTRAVENOUS ONCE AS NEEDED
OUTPATIENT
Start: 2025-04-29

## 2025-04-29 RX ORDER — DIPHENHYDRAMINE HYDROCHLORIDE 50 MG/ML
50 INJECTION, SOLUTION INTRAMUSCULAR; INTRAVENOUS AS NEEDED
OUTPATIENT
Start: 2025-04-29

## 2025-04-29 RX ORDER — EPINEPHRINE 1 MG/ML
0.3 INJECTION, SOLUTION, CONCENTRATE INTRAVENOUS EVERY 5 MIN PRN
OUTPATIENT
Start: 2025-04-29

## 2025-04-29 RX ORDER — KETOROLAC TROMETHAMINE 30 MG/ML
30 INJECTION, SOLUTION INTRAMUSCULAR; INTRAVENOUS ONCE
OUTPATIENT
Start: 2025-04-29 | End: 2025-04-29

## 2025-04-29 RX ORDER — ONDANSETRON HYDROCHLORIDE 2 MG/ML
4 INJECTION, SOLUTION INTRAVENOUS ONCE
OUTPATIENT
Start: 2025-04-29 | End: 2025-04-29

## 2025-04-29 SDOH — ECONOMIC STABILITY: FOOD INSECURITY: WITHIN THE PAST 12 MONTHS, THE FOOD YOU BOUGHT JUST DIDN'T LAST AND YOU DIDN'T HAVE MONEY TO GET MORE.: NEVER TRUE

## 2025-04-29 SDOH — ECONOMIC STABILITY: FOOD INSECURITY: WITHIN THE PAST 12 MONTHS, YOU WORRIED THAT YOUR FOOD WOULD RUN OUT BEFORE YOU GOT MONEY TO BUY MORE.: NEVER TRUE

## 2025-04-29 ASSESSMENT — LIFESTYLE VARIABLES
HOW OFTEN DO YOU HAVE SIX OR MORE DRINKS ON ONE OCCASION: NEVER
HOW MANY STANDARD DRINKS CONTAINING ALCOHOL DO YOU HAVE ON A TYPICAL DAY: 1 OR 2
HOW OFTEN DO YOU HAVE A DRINK CONTAINING ALCOHOL: MONTHLY OR LESS
AUDIT-C TOTAL SCORE: 1
SKIP TO QUESTIONS 9-10: 1

## 2025-04-29 ASSESSMENT — ENCOUNTER SYMPTOMS
OCCASIONAL FEELINGS OF UNSTEADINESS: 0
EYE PAIN: 0
DEPRESSION: 0
DIFFICULTY URINATING: 0
NUMBNESS: 1
SHORTNESS OF BREATH: 0
BLOOD IN STOOL: 0
FEVER: 0
ADENOPATHY: 0
LOSS OF SENSATION IN FEET: 0

## 2025-04-29 ASSESSMENT — PATIENT HEALTH QUESTIONNAIRE - PHQ9
2. FEELING DOWN, DEPRESSED OR HOPELESS: NOT AT ALL
1. LITTLE INTEREST OR PLEASURE IN DOING THINGS: NOT AT ALL
SUM OF ALL RESPONSES TO PHQ9 QUESTIONS 1 AND 2: 0

## 2025-04-29 ASSESSMENT — PAIN SCALES - GENERAL
PAINLEVEL_OUTOF10: 8
PAINLEVEL_OUTOF10: 8

## 2025-04-29 ASSESSMENT — PAIN - FUNCTIONAL ASSESSMENT: PAIN_FUNCTIONAL_ASSESSMENT: 0-10

## 2025-04-29 NOTE — PROGRESS NOTES
"Chief Complain    New Patient Visit (For pain right leg that's been going on for a year. Had open fracture surgery 11/19/2024, that was performed by Dr. Guerra. Have images on file with . Currently in Pt, aquatic therapy, scrambler therapY. None has help with the pain. Is currently take gabapentin, amtriplyne, and topirmate. Is still in constant pain. Was referred for ketamine infusions.)    History Of Present Illness  William Abrams \"Aleksander\" is a 34 y.o. female here for evaluation of right lower extremity pain from her ankle to her knee. The patient has been experiencing these symptoms for last one year(s). The patient describes the pain as sporadic sharp pain. The patient's current pain score is 7-8 on a scale from 0-10. The pain is worsened by standing and driving  and is alleviated by nothing relieves the pain. Since the start of the symptoms the pain has been worse.    The patient denies any fever, chills, weight loss, numbness, bladder/ bowel incontinence, history of cancer, history of IV drug abuse, recent trauma.    Past Medical History  She has a past medical history of Disease of stomach and duodenum, unspecified, Encounter for gynecological examination (general) (routine) without abnormal findings (02/12/2016), Headache, unspecified (12/30/2013), Other conditions influencing health status, Other conditions influencing health status, and Presence of (intrauterine) contraceptive device.    Surgical History  She has a past surgical history that includes Leg Surgery (Right).    Social History  She reports that she has been smoking cigarettes. She does not have any smokeless tobacco history on file. She reports that she does not currently use alcohol. She reports current drug use. Drug: Marijuana.    Family History  Family History[1]     Allergies  Citrus and derivatives, Fish derived, Peanut, Shellfish derived, Lactose, and Latex    Review of Systems  Review of Systems   Constitutional:  Negative for fever. " "  HENT:  Negative for ear pain.    Eyes:  Negative for pain.   Respiratory:  Negative for shortness of breath.    Cardiovascular:  Negative for chest pain.   Gastrointestinal:  Negative for blood in stool.   Endocrine: Negative for cold intolerance.   Genitourinary:  Negative for difficulty urinating.   Skin:  Negative for rash.   Allergic/Immunologic: Positive for food allergies.   Neurological:  Positive for numbness.   Hematological:  Negative for adenopathy.   Psychiatric/Behavioral:  Negative for suicidal ideas.         Physical Exam  Physical Exam  Constitutional:       Appearance: Normal appearance.   HENT:      Head: Normocephalic and atraumatic.   Eyes:      Extraocular Movements: Extraocular movements intact.      Pupils: Pupils are equal, round, and reactive to light.   Cardiovascular:      Rate and Rhythm: Normal rate and regular rhythm.   Pulmonary:      Effort: Pulmonary effort is normal.   Abdominal:      Palpations: Abdomen is soft.   Musculoskeletal:      Cervical back: Neck supple.   Skin:     General: Skin is warm.   Neurological:      General: No focal deficit present.      Mental Status: She is alert and oriented to person, place, and time.   Psychiatric:         Mood and Affect: Mood normal.         Behavior: Behavior normal.           Last Recorded Vitals  Blood pressure 114/56, pulse 93, temperature 37 °C (98.6 °F), resp. rate 18, height 1.6 m (5' 3\"), weight 52.2 kg (115 lb), SpO2 98%.    Reviewed Images  Reviewed and independently interpreted Xray right leg reveal intramedullary nailing in her right tibia with no evidence of hardware failure, callus formation in the distal tibia with no evidence of nonunion or malunion    Reviewed Labs   Latest Reference Range & Units 11/20/23 05:45   GLUCOSE 74 - 99 mg/dL 89   SODIUM 136 - 145 mmol/L 137   POTASSIUM 3.5 - 5.3 mmol/L 3.6   CHLORIDE 98 - 107 mmol/L 102   Bicarbonate 21 - 32 mmol/L 27   Anion Gap 10 - 20 mmol/L 12   Blood Urea Nitrogen 6 - 23 " "mg/dL 16   Creatinine 0.50 - 1.05 mg/dL 0.75   EGFR >60 mL/min/1.73m*2 >90   Calcium 8.6 - 10.6 mg/dL 8.4 (L)   (L): Data is abnormally low      Latest Reference Range & Units 11/20/23 05:45   WBC 4.4 - 11.3 x10*3/uL 13.9 (H)   nRBC 0.0 - 0.0 /100 WBCs 0.0   RBC 4.00 - 5.20 x10*6/uL 2.78 (L)   HEMOGLOBIN 12.0 - 16.0 g/dL 9.3 (L)   HEMATOCRIT 36.0 - 46.0 % 28.6 (L)   MCV 80 - 100 fL 103 (H)   MCH 26.0 - 34.0 pg 33.5   MCHC 32.0 - 36.0 g/dL 32.5   RED CELL DISTRIBUTION WIDTH 11.5 - 14.5 % 11.9   Platelets 150 - 450 x10*3/uL 249   (H): Data is abnormally high  (L): Data is abnormally low  Assessment/Plan   Encounter Diagnoses   Name Primary?    Complex regional pain syndrome type 1 of right lower extremity     Complex regional pain syndrome type 1, affecting unspecified site         William Abrams \"Aleksander\" is a 34 y.o. female here for evaluation of chronic right lower extremity pain which she has been experiencing for last 1 year or so.  After she fractured her tibia a year ago after falling from stairs.  She is status post open reduction internal fixation.  She however continues to have significant pain associated with hypersensitivity, allodynia, vasomotor changes.  He has been diagnosed with complex regional pain syndrome, she has been evaluated and treated by Dr. Griffintic she has had medical management with membrane stabilizers, she had tried physical therapy, scrambler therapy and lumbar sympathetic block with limited benefit.  Lumbar sympathetic block worsens her symptoms.  The pain has been gradually getting worse over the last several months.  Given the lack of conservative treatment thus far she is a good candidate for ketamine, lidocaine and propofol infusions.  Discussed risk benefits and alternatives.  Does not have any indications of the infusion.  She is to proceed with the discussed therapy.           Manuelito Adan MD         [1] No family history on file.    "

## 2025-05-02 ENCOUNTER — APPOINTMENT (OUTPATIENT)
Dept: PODIATRY | Facility: CLINIC | Age: 35
End: 2025-05-02
Payer: COMMERCIAL

## 2025-05-21 ENCOUNTER — EVALUATION (OUTPATIENT)
Dept: PHYSICAL THERAPY | Facility: CLINIC | Age: 35
End: 2025-05-21
Payer: COMMERCIAL

## 2025-05-21 DIAGNOSIS — G89.29 CHRONIC PAIN OF RIGHT KNEE: ICD-10-CM

## 2025-05-21 DIAGNOSIS — G89.29 CHRONIC PAIN OF RIGHT ANKLE: ICD-10-CM

## 2025-05-21 DIAGNOSIS — M25.571 CHRONIC PAIN OF RIGHT ANKLE: ICD-10-CM

## 2025-05-21 DIAGNOSIS — G90.521 COMPLEX REGIONAL PAIN SYNDROME TYPE 1 OF RIGHT LOWER EXTREMITY: Primary | ICD-10-CM

## 2025-05-21 DIAGNOSIS — M25.561 CHRONIC PAIN OF RIGHT KNEE: ICD-10-CM

## 2025-05-21 PROCEDURE — 97110 THERAPEUTIC EXERCISES: CPT | Mod: GP

## 2025-05-21 PROCEDURE — 97161 PT EVAL LOW COMPLEX 20 MIN: CPT | Mod: GP

## 2025-05-21 ASSESSMENT — PAIN - FUNCTIONAL ASSESSMENT: PAIN_FUNCTIONAL_ASSESSMENT: 0-10

## 2025-05-21 ASSESSMENT — PAIN SCALES - GENERAL: PAINLEVEL_OUTOF10: 8

## 2025-05-21 NOTE — PROGRESS NOTES
"  Physical Therapy  Physical Therapy Orthopedic Evaluation    Patient Name: William Abrams \"Aleksander\"  MRN: [id number]  Today's Date: 5/21/2025  Time Calculation  Start Time: 1320  Stop Time: 1400  Time Calculation (min): 40 min    Insurance:  Visit number: 5 [place]  Authorization info: no auth   Ins[facility]     General:  Reason for visit: CRPS1  Referred by: Leonardo Yadav       Current Problem  1. Complex regional pain syndrome type 1 of right lower extremity  Referral to Physical Therapy      2. Chronic pain of right ankle  Follow Up In Physical Therapy      3. Chronic pain of right knee            Precautions:   Precautions  STEADI Fall Risk Score (The score of 4 or more indicates an increased risk of falling): 6  Precautions Comment: None    Medical History Form: Reviewed (scanned into chart)    Subjective:   35 y.o. female presents to the clinic with complaints of R LE pain.  Diagnosed with CRPS1 & has been to this clinic twice for the same complaints.  Has also trailed multiple modalities such as electric shock therapy, aquatic, & injections without improvements in symptoms.She fractured her her tibia/fibula in 2023 where she has since experienced nerve pain and numbness in her LE. During the evaluation she dons a knee brace on her R.  She reports due to her LE symptoms she experiences instability, bracing helps improve this sensation.  Also notes she Fractured her L ankle in January of 2024 that she feels was due to overuse.   Is starting Ketamine injections in June.  Patient notes she is unable to stand for long periods of time w/o increased pain. Additionally wants to work on her balance as she often feels unstable.     Current Condition:   Same    Pain:  Pain Assessment: 0-10  0-10 (Numeric) Pain Score: 8  Pain Type: Chronic pain  Pain Location: Leg  Pain Orientation: Right  Location: R lower leg pain   Description: numb & dull   Aggravating Factors: movement   Relieving Factors:  " Rest    Relevant Information (PMH & Previous Tests/Imaging): x-ray  Previous Interventions/Treatments: Physical Therapy and Injections    Prior Level of Function (PLOF)  Patient previously independent with all ADLs  Exercise/Physical Activity: None     Patients Living Environment: Reviewed and no concern    Primary Language: English    There are no spiritual/cultural practices/values/needs that are important to know    Patient's Goal(s) for Therapy: reduce her pain and improve mobility     Red Flags: Do you have any of the following? No  Fever/chills, unexplained weight changes, dizziness/fainting, unexplained change in bowel or bladder functions, unexplained malaise or muscle weakness, night pain/sweats, numbness or tingling    Objective:  Objective       ROM    AROM (Degrees)    Grossly WNL        Strength Testing  Grossly 5/5 L  Grossly 4/5 R limited due to pain       Functional Screening  Squat: NBOS, notes pain with deep flexion     Stairs: reciprocal pattern, HRS, mildly unsteady, no instability noted in R knee       Posture: forward flexed       Palpation: tenderness to palpation at anterior surface of R shin       Flexibility: WNL    Gait: decreased cong, no other deviations noted        Outcome Measures:       EDUCATION: home exercise program, plan of care, activity modifications, pain management, and injury pathology       Goals: Set and discussed today  Active       PT Problem       PT Goals- STG        Start:  01/21/25    Expected End:  06/25/25       SHORT TERM GOALS:  1) Patient will report that she is able to stand for ~3 hours without exacerbations in pain demonstrating improvements in her functional capacity within 5 weeks.   2) Patient will demonstrate compliance in her HEP demonstrating improvements towards independence in HCM within 1 week.   3) Patient will demonstrate a 4/5 gross LE MMT grade demonstrating improvements in symptoms that are not limited by pain within 5 weeks.          PT  Goals- LTG        Start:  01/21/25    Expected End:  07/16/25       LONG TERM GOALS:  1) 1) Patient will report that she is able to stand for ~5 hours without exacerbations in pain demonstrating improvements in her functional capacity within 8 weeks.     2) Pt will improve LE strength to 5/5 in order to promote improved proximal stability throughout LE chain, that is not limited by pain within 8 weeks.  3) Pt will demonstrate reciprocal stair negotiation w/o HRS & no visible signs of instability demonstrating improved LE balance and stability with functional movements within 8 weeks.              Plan of care was developed with input and agreement by the patient      Treatment Performed:    Therapeutic Exercise:    25 min  Access Code: [email or web address]  URL: [email or web address]  Date: 05/21/2025  Prepared by: Angela Chavarria    Exercises  - Heel Raises with Counter Support  - 1 x daily - 7 x weekly - 2 sets - 20 reps  - Standard Lunge  - 1 x daily - 7 x weekly - 2 sets - 15 reps  - Sit to Stand with Arms Crossed  - 1 x daily - 7 x weekly - 2 sets - 20 reps  - Step Up  - 1 x daily - 7 x weekly - 2 sets - 30 reps      PT Evaluation Time Entry  PT Evaluation (Low) Time Entry: 15  PT Therapeutic Procedures Time Entry  Therapeutic Exercise Time Entry: 25      Eval low  TEx2                Assessment:   William Abrams presents to the clinic with complaints of CRPS1 that affects her R LE.  Patient primary complaints consist of chronic pain that interferes with er ability to stand, walk, and perform other functional daily tasks without high levels of pain. Patient returns to the clinic for her second round of aquatic therapy.  She was given a HEP consisting of functional movements to perform in conjunction with therapy.  Patient was educated on the course of care.  She would benefit from skilled PT to appropriately address her chronic complaints and improve her participation in ADL.        Clinical  Presentation: Stable and/or uncomplicated characteristics    Plan:     Planned Interventions include: therapeutic exercise, self-care home management, manual therapy, therapeutic activities, gait training, neuromuscular coordination, vasopneumatic, dry needling, aquatic therapy  Frequency: 1 x Week  Duration: 8 Weeks  Rehab Potential/Prognosis: Donnie Chavarria, PT

## 2025-06-03 DIAGNOSIS — G90.521 COMPLEX REGIONAL PAIN SYNDROME TYPE 1 OF RIGHT LOWER EXTREMITY: Primary | ICD-10-CM

## 2025-06-03 RX ORDER — KETOROLAC TROMETHAMINE 30 MG/ML
30 INJECTION, SOLUTION INTRAMUSCULAR; INTRAVENOUS ONCE
OUTPATIENT
Start: 2025-06-05 | End: 2025-06-05

## 2025-06-05 ENCOUNTER — TREATMENT (OUTPATIENT)
Dept: PHYSICAL THERAPY | Facility: CLINIC | Age: 35
End: 2025-06-05
Payer: COMMERCIAL

## 2025-06-05 DIAGNOSIS — M25.571 CHRONIC PAIN OF RIGHT ANKLE: Primary | ICD-10-CM

## 2025-06-05 DIAGNOSIS — G89.29 CHRONIC PAIN OF RIGHT ANKLE: Primary | ICD-10-CM

## 2025-06-05 DIAGNOSIS — M25.561 CHRONIC PAIN OF RIGHT KNEE: ICD-10-CM

## 2025-06-05 DIAGNOSIS — G89.29 CHRONIC PAIN OF RIGHT KNEE: ICD-10-CM

## 2025-06-05 PROCEDURE — 97113 AQUATIC THERAPY/EXERCISES: CPT | Mod: GP

## 2025-06-05 NOTE — PROGRESS NOTES
"  Physical Therapy  Physical Therapy Treatment Note    Patient Name: William Abrams \"Aleksander\"  MRN: [id number]  Today's Date: 6/5/2025  Time Calculation  Start Time: 0930  Stop Time: 1015  Time Calculation (min): 45 min    Insurance:  Visit number: 6 [facility]  Authorization info: no auth   Insurance Type: [facility] medicaid     General:  Reason for visit: CRPS1  Referred by: Leonardo Yadav     Current Problem  1. Chronic pain of right ankle        2. Chronic pain of right knee            Precautions:  Falls risk       Subjective:     Patient reports she started working part time.  Notes increased time on her feet has lead to increased pain. Notes bilateral ankle pain. She is getting her ketamine shot tomorrow.      Pain   8    Performing HEP?: No      Objective:   Decreased R sided WB       Treatment Performed:    Therapeutic Exercise:    45 min  Pool Depth: 4 foot, Temperature 92°    -Forward walk 3'  -Retro walk 3'  -Side Step 4'  -MIP 4'  -Alt Hams 4'  -HR/TR 2'  -Hip ext R/L 2' each  -Hip abd/add R/L 2' each  -1/4 squats 2'       PT Therapeutic Procedures Time Entry  Aquatic Therapy Time Entry: 45                      Assessment:   William Abrams continues to experience high levels of pain due to her CRPS.  However, patient has been more active starting her new job.  Enrico reports she felt symptom relief working with aquatic therapy within and post session.   Patient has 3 more AQ sessions before her re-check.       Plan:  MARITZA Chavarria, PT    "

## 2025-06-12 ENCOUNTER — TREATMENT (OUTPATIENT)
Dept: PHYSICAL THERAPY | Facility: CLINIC | Age: 35
End: 2025-06-12
Payer: COMMERCIAL

## 2025-06-12 DIAGNOSIS — G89.29 CHRONIC PAIN OF RIGHT ANKLE: ICD-10-CM

## 2025-06-12 DIAGNOSIS — M25.571 CHRONIC PAIN OF RIGHT ANKLE: ICD-10-CM

## 2025-06-12 DIAGNOSIS — M25.561 CHRONIC PAIN OF RIGHT KNEE: ICD-10-CM

## 2025-06-12 DIAGNOSIS — G90.521 COMPLEX REGIONAL PAIN SYNDROME TYPE 1 OF RIGHT LOWER EXTREMITY: Primary | ICD-10-CM

## 2025-06-12 DIAGNOSIS — G89.29 CHRONIC PAIN OF RIGHT KNEE: ICD-10-CM

## 2025-06-12 PROCEDURE — 97113 AQUATIC THERAPY/EXERCISES: CPT | Mod: GP

## 2025-06-12 NOTE — PROGRESS NOTES
"  Physical Therapy  Physical Therapy Treatment Note    Patient Name: William Abrams \"Aleksander\"  MRN: [id number]  Today's Date: 6/12/2025  Time Calculation  Start Time: 1155  Stop Time: 1230  Time Calculation (min): 35 min    Insurance:  Visit [place]: 7 [place]  Authorization info: no auth   Insurance Type: [facility] medicaid     General:  Reason for visit: CRPS1  Referred by: Leonardo Yadav     Current Problem  1. Complex regional pain syndrome type 1 of right lower extremity        2. Chronic pain of right ankle        3. Chronic pain of right knee            Precautions:  Falls risk       Subjective:     Patient continues to experience pain at her part time job she picked up.  She works part time as a  at a hotel. Notes both her ankles have been painful.     Patient arrived ~10 minutes late to her appointment today.     Pain   8    Performing HEP?: No      Objective:   Decreased R sided WB       Treatment Performed:    Therapeutic Exercise:    35 min  Pool Depth: 4 foot, Temperature 92°    -Forward walk 3'  -Retro walk 3'  -Side Step 4'  -MIP 4'  -Alt Hams 4'  -HR/TR 2'  -Hip ext R/L 2' each  -Hip abd/add R/L 2' each  -1/4 squats 2'       PT Therapeutic Procedures Time Entry  Aquatic Therapy Time Entry: 35         Units: AQ             Assessment:   William Abrams continues to experience high levels of pain due to her CRPS.  Patient notes she has felt improvements in symptoms for ~48 hours after last session.  Noting mild improvements after this session as well 8/10-> 5/10.  Patient has 2 additional AQ therapy sessions before her recheck.      Plan:  AQ      Angela Chavarria, PT    "

## 2025-06-18 ENCOUNTER — TREATMENT (OUTPATIENT)
Dept: PHYSICAL THERAPY | Facility: CLINIC | Age: 35
End: 2025-06-18
Payer: COMMERCIAL

## 2025-06-18 DIAGNOSIS — M25.561 CHRONIC PAIN OF RIGHT KNEE: ICD-10-CM

## 2025-06-18 DIAGNOSIS — M25.571 CHRONIC PAIN OF RIGHT ANKLE: Primary | ICD-10-CM

## 2025-06-18 DIAGNOSIS — G89.29 CHRONIC PAIN OF RIGHT ANKLE: Primary | ICD-10-CM

## 2025-06-18 DIAGNOSIS — G89.29 CHRONIC PAIN OF RIGHT KNEE: ICD-10-CM

## 2025-06-18 ASSESSMENT — PAIN - FUNCTIONAL ASSESSMENT: PAIN_FUNCTIONAL_ASSESSMENT: 0-10

## 2025-06-18 ASSESSMENT — PAIN SCALES - GENERAL: PAINLEVEL_OUTOF10: 6

## 2025-06-18 NOTE — PROGRESS NOTES
"  Physical Therapy  Physical Therapy Treatment Note    Patient Name: William Abrams \"Aleksander\"  MRN: [id number]  Today's Date: 6/18/2025  Time Calculation  Start Time: 1051  Stop Time: 1130  Time Calculation (min): 39 min    Insurance:  Visit [place]: 8 [place]  Authorization info: no auth   Insurance Type: [facility] medicaid     General:  Reason for visit: CRPS1  Referred by: Leonardo Yadav     Current Problem  1. Chronic pain of right ankle        2. Chronic pain of right knee            Precautions:  Falls risk     Subjective:   Pt reports having a marginally better day today compared to most days.  Patient was ready for pool therapy 6 minutes late to session.     Pain  Pain Assessment: 0-10  0-10 (Numeric) Pain Score: 6  Pain Type: Chronic pain  Pain Location: Leg  Pain Orientation: Right8    Performing HEP?: No      Objective:   Antalgic gait pattern with double UE support ascending and descending pool stairs.     Treatment Performed:    Therapeutic Exercise:    35 min  Pool Depth: 4 foot, Temperature 92°    -Forward walk 5'  -Retro walk 5'  -Side Step 5'  -HR/TR 5'  -hip abduction 5'  -HS curls 5'  -MIP 5''               Units: AQ 3 units             Assessment:   Pt requires verbal cueing to maintain upright posture during hip extension. Pt had greatest difficulty with HR/TR.     Plan:  MARITZA Hartman PTA    "

## 2025-06-19 DIAGNOSIS — G90.521 COMPLEX REGIONAL PAIN SYNDROME TYPE 1 OF RIGHT LOWER EXTREMITY: Primary | ICD-10-CM

## 2025-06-19 RX ORDER — KETOROLAC TROMETHAMINE 30 MG/ML
30 INJECTION, SOLUTION INTRAMUSCULAR; INTRAVENOUS ONCE
OUTPATIENT
Start: 2025-06-26 | End: 2025-06-26

## 2025-06-25 ENCOUNTER — TREATMENT (OUTPATIENT)
Dept: PHYSICAL THERAPY | Facility: CLINIC | Age: 35
End: 2025-06-25
Payer: COMMERCIAL

## 2025-06-25 DIAGNOSIS — M25.561 CHRONIC PAIN OF RIGHT KNEE: ICD-10-CM

## 2025-06-25 DIAGNOSIS — G89.29 CHRONIC PAIN OF RIGHT KNEE: ICD-10-CM

## 2025-06-25 DIAGNOSIS — G89.29 CHRONIC PAIN OF RIGHT ANKLE: Primary | ICD-10-CM

## 2025-06-25 DIAGNOSIS — M25.571 CHRONIC PAIN OF RIGHT ANKLE: Primary | ICD-10-CM

## 2025-06-25 PROCEDURE — 97113 AQUATIC THERAPY/EXERCISES: CPT | Mod: GP

## 2025-06-25 NOTE — PROGRESS NOTES
"  Physical Therapy  Physical Therapy Treatment Note    Patient Name: William Abrams \"Aleksander\"  MRN: [id number]  Today's Date: 6/25/2025  Time Calculation  Start Time: 1000  Stop Time: 1045  Time Calculation (min): 45 min    Insurance:  Visit [place]: [place]  Authorization info: no auth   Insurance Type: [facility] medicaid     General:  Reason for visit: CRPS1  Referred by: Leonardo Yadav     Current Problem  1. Chronic pain of right ankle        2. Chronic pain of right knee            Precautions:  Falls risk     Subjective:     Patient is feeling less pain today.  Feels the warm weather has helped with her pain.     Pain   6    Performing HEP?: No      Objective:   Decreased antalgic gait     Treatment Performed:    Therapeutic Exercise:    45 min  Pool Depth: 4 foot, Temperature 92°    -Forward walk 5'  -Retro walk 5'  -Side Step 5'  -HR/TR 5'  -hip abduction 5'  -HS curls 5'  -MIP 5''     PT Therapeutic Procedures Time Entry  Aquatic Therapy Time Entry: 45         Units: AQ 3              Assessment:   William bArams continues to express pain in her LE due to chronic pain.  Patient continues to demonstrate poor carryover in symptoms between sessions.  Today was her last AQT session before he re-check.        Plan:  MARITZA Chavarria, PT    "

## 2025-06-26 ENCOUNTER — INFUSION (OUTPATIENT)
Dept: INFUSION THERAPY | Facility: CLINIC | Age: 35
End: 2025-06-26
Payer: COMMERCIAL

## 2025-06-26 DIAGNOSIS — G90.521 COMPLEX REGIONAL PAIN SYNDROME TYPE 1 OF RIGHT LOWER EXTREMITY: ICD-10-CM

## 2025-06-26 NOTE — PROGRESS NOTES
6/26/2025 Patient came to infusion appointment by medical transport who dropped her off. Patient was informed by this RN of the importance of having a responsible adult who the patient is familiar with as her ride and/or chaperone. Patient was also given a copy of the policies and this RN explained all of the infusion center policies to the patient. Patient was scheduled a future appointment and again reminded to have a responsible adult to accompany patient.

## 2025-06-27 ENCOUNTER — APPOINTMENT (OUTPATIENT)
Dept: OBSTETRICS AND GYNECOLOGY | Facility: CLINIC | Age: 35
End: 2025-06-27
Payer: COMMERCIAL

## 2025-06-27 VITALS
WEIGHT: 97.2 LBS | SYSTOLIC BLOOD PRESSURE: 105 MMHG | DIASTOLIC BLOOD PRESSURE: 70 MMHG | HEART RATE: 102 BPM | BODY MASS INDEX: 17.22 KG/M2

## 2025-06-27 DIAGNOSIS — Z01.419 WELL WOMAN EXAM WITH ROUTINE GYNECOLOGICAL EXAM: Primary | ICD-10-CM

## 2025-06-27 DIAGNOSIS — Z11.3 SCREENING FOR STD (SEXUALLY TRANSMITTED DISEASE): ICD-10-CM

## 2025-06-27 PROCEDURE — 99395 PREV VISIT EST AGE 18-39: CPT

## 2025-06-27 ASSESSMENT — PATIENT HEALTH QUESTIONNAIRE - PHQ9
2. FEELING DOWN, DEPRESSED OR HOPELESS: NOT AT ALL
1. LITTLE INTEREST OR PLEASURE IN DOING THINGS: SEVERAL DAYS
2. FEELING DOWN, DEPRESSED OR HOPELESS: SEVERAL DAYS
1. LITTLE INTEREST OR PLEASURE IN DOING THINGS: NOT AT ALL
SUM OF ALL RESPONSES TO PHQ9 QUESTIONS 1 AND 2: 2
SUM OF ALL RESPONSES TO PHQ9 QUESTIONS 1 AND 2: 0

## 2025-06-27 ASSESSMENT — ENCOUNTER SYMPTOMS
MUSCULOSKELETAL NEGATIVE: 0
ALLERGIC/IMMUNOLOGIC NEGATIVE: 0
HEMATOLOGIC/LYMPHATIC NEGATIVE: 0
RESPIRATORY NEGATIVE: 0
PSYCHIATRIC NEGATIVE: 0
GASTROINTESTINAL NEGATIVE: 0
ENDOCRINE NEGATIVE: 0
EYES NEGATIVE: 0
NEUROLOGICAL NEGATIVE: 0
CONSTITUTIONAL NEGATIVE: 0
CARDIOVASCULAR NEGATIVE: 0

## 2025-06-27 ASSESSMENT — PAIN SCALES - GENERAL: PAINLEVEL_OUTOF10: 0-NO PAIN

## 2025-06-27 NOTE — PROGRESS NOTES
"Karl Abrams \"Aleksander\" is a 35 y.o. female who is here for Annual Exam (Patient is here for annual /No pain /No falls /Last pap 3/18/2021/STD blood and urine ).     Concerns today:  Wanting to have IUD removed- desires pregnancy     Periods are non existent on IUD    Sexual Activity: sexually active, male partners; Patient reports 1 partners in the last 12 months.    History of prior STI: none  Desires STI screening? Yes    Current contraception: IUD    Last pap:   Last mammogram: NA    Family history of uterine or ovarian cancer: no  Family history of breast cancer: no      OB History    Para Term  AB Living   1 1 1 0 0 1   SAB IAB Ectopic Multiple Live Births   0 0 0 0 1      Objective   /70   Pulse 102   Wt (!) 44.1 kg (97 lb 3.2 oz)      General:   Alert and oriented x 3   Heart:  Lungs: Regular rate, rhythm  Clear to auscultation bilaterally   Thyroid: Euthyroid, normal shape and size   Breast: Symmetrical, no skin changes/nipple discharge, redness, tenderness, no masses palpated bilaterally   Abdomen: Soft, non tender   Vulva: EGBUS normal   Vagina: Pink, normal discharge   Cervix: No CMT, IUD strings visible.    Uterus: Normal shape, size   Adnexa: NT bilaterally       Assessment/Plan   Diagnoses and all orders for this visit:  Well woman exam with routine gynecological exam  Screening for STD (sexually transmitted disease)  -     C. trachomatis / N. gonorrhoeae, Amplified, Urogenital; Future  -     Hepatitis B Surface Antibody; Future  -     Hepatitis B Surface Antigen; Future  -     Hepatitis C Antibody; Future  -     HIV 1/2 Antigen/Antibody Screen with Reflex to Confirmation; Future  -     Syphilis Screen with Reflex; Future  -     Trichomonas vaginalis, Amplified; Future  -     HSV1 IgG and HSV2 IgG; Future    Offered to remove IUD today, patient decided that she would like to keep it in a bit longer.   All patient questions answered.   Encouraged to reach out to " our office with any questions or concerns.   Encouraged patient to follow up for IUD removal when desired, annually, and prn    GALILEO Magallanes

## 2025-06-28 LAB
C TRACH RRNA SPEC QL NAA+PROBE: NOT DETECTED
HBV SURFACE AB SERPL IA-ACNC: <5 MIU/ML
HBV SURFACE AG SERPL QL IA: NORMAL
HCV AB SERPL QL IA: NORMAL
HIV 1+2 AB+HIV1 P24 AG SERPL QL IA: NORMAL
HIV 1+2 AB+HIV1 P24 AG SERPL QL IA: NORMAL
HSV1 IGG SER IA-ACNC: NORMAL
HSV2 IGG SER IA-ACNC: NORMAL
N GONORRHOEA RRNA SPEC QL NAA+PROBE: NOT DETECTED
QUEST GC CT AMPLIFIED (ALWAYS MESSAGE): NORMAL
T PALLIDUM AB SER QL IA: NORMAL
T VAGINALIS RRNA SPEC QL NAA+PROBE: DETECTED

## 2025-06-30 ENCOUNTER — TELEPHONE (OUTPATIENT)
Dept: OBSTETRICS AND GYNECOLOGY | Facility: CLINIC | Age: 35
End: 2025-06-30
Payer: COMMERCIAL

## 2025-06-30 ENCOUNTER — TELEPHONE (OUTPATIENT)
Dept: PEDIATRICS | Facility: CLINIC | Age: 35
End: 2025-06-30
Payer: COMMERCIAL

## 2025-06-30 DIAGNOSIS — A59.01 TRICHOMONAS VAGINITIS: Primary | ICD-10-CM

## 2025-06-30 RX ORDER — METRONIDAZOLE 500 MG/1
500 TABLET ORAL 2 TIMES DAILY
Qty: 14 TABLET | Refills: 0 | Status: SHIPPED | OUTPATIENT
Start: 2025-06-30 | End: 2025-07-07

## 2025-06-30 NOTE — TELEPHONE ENCOUNTER
Dorris alejo was consulted by [facility] to contact pt via phone call to provide counseling resources for sx of depression. This laejoker contacted pt at [phone], no answer, left VM offering support and requesting a c/b. Will also send [employer] message.     ALIVIA Acevedo

## 2025-06-30 NOTE — TELEPHONE ENCOUNTER
Pt notified of + trich, need for tx, partners tx, abstinence x 7d and safer sex. Offered EPT and rx also sent by amy norwood for pt and partner Evon Abrams [id number]

## 2025-07-02 ENCOUNTER — APPOINTMENT (OUTPATIENT)
Dept: PHYSICAL THERAPY | Facility: CLINIC | Age: 35
End: 2025-07-02
Payer: COMMERCIAL

## 2025-07-02 DIAGNOSIS — M25.561 CHRONIC PAIN OF RIGHT KNEE: ICD-10-CM

## 2025-07-02 DIAGNOSIS — M25.571 CHRONIC PAIN OF RIGHT ANKLE: Primary | ICD-10-CM

## 2025-07-02 DIAGNOSIS — G89.29 CHRONIC PAIN OF RIGHT ANKLE: Primary | ICD-10-CM

## 2025-07-02 DIAGNOSIS — G89.29 CHRONIC PAIN OF RIGHT KNEE: ICD-10-CM

## 2025-07-02 LAB
HBV SURFACE AB SERPL IA-ACNC: <5 MIU/ML
HBV SURFACE AG SERPL QL IA: NORMAL
HCV AB SERPL QL IA: NORMAL
HIV 1+2 AB+HIV1 P24 AG SERPL QL IA: NORMAL
HIV 1+2 AB+HIV1 P24 AG SERPL QL IA: NORMAL
HSV1 IGG SER IA-ACNC: <0.9 INDEX
HSV2 IGG SER IA-ACNC: <0.9 INDEX
T PALLIDUM AB SER QL IA: NEGATIVE

## 2025-07-03 ENCOUNTER — TELEPHONE (OUTPATIENT)
Dept: PAIN MEDICINE | Facility: CLINIC | Age: 35
End: 2025-07-03
Payer: COMMERCIAL

## 2025-07-03 NOTE — LETTER
"July 3, 2025     Patient: William Abrams   YOB: 1990   Date of Visit: 6/26/2025       To Whom It May Concern:    William Abrams was seen in our clinic on 6/26/2025. Please excuse William \"Aleksander\" for her absence from work on this day to make the appointment.    If you have any questions or concerns, please don't hesitate to call.         Sincerely,         Manuelito Adan MD        CC: No Recipients  "

## 2025-07-10 ENCOUNTER — APPOINTMENT (OUTPATIENT)
Dept: PHYSICAL THERAPY | Facility: CLINIC | Age: 35
End: 2025-07-10
Payer: COMMERCIAL

## 2025-07-10 DIAGNOSIS — M25.561 CHRONIC PAIN OF RIGHT KNEE: ICD-10-CM

## 2025-07-10 DIAGNOSIS — M25.571 CHRONIC PAIN OF RIGHT ANKLE: Primary | ICD-10-CM

## 2025-07-10 DIAGNOSIS — G89.29 CHRONIC PAIN OF RIGHT KNEE: ICD-10-CM

## 2025-07-10 DIAGNOSIS — G89.29 CHRONIC PAIN OF RIGHT ANKLE: Primary | ICD-10-CM

## 2025-07-15 RX ORDER — KETOROLAC TROMETHAMINE 30 MG/ML
30 INJECTION, SOLUTION INTRAMUSCULAR; INTRAVENOUS ONCE
Status: CANCELLED | OUTPATIENT
Start: 2025-07-17 | End: 2025-07-17

## 2025-07-17 ENCOUNTER — INFUSION (OUTPATIENT)
Dept: INFUSION THERAPY | Facility: CLINIC | Age: 35
End: 2025-07-17
Payer: COMMERCIAL

## 2025-07-17 VITALS
OXYGEN SATURATION: 95 % | HEART RATE: 72 BPM | RESPIRATION RATE: 11 BRPM | DIASTOLIC BLOOD PRESSURE: 62 MMHG | TEMPERATURE: 97.9 F | SYSTOLIC BLOOD PRESSURE: 109 MMHG

## 2025-07-17 DIAGNOSIS — G90.521 COMPLEX REGIONAL PAIN SYNDROME TYPE 1 OF RIGHT LOWER EXTREMITY: ICD-10-CM

## 2025-07-17 LAB — PREGNANCY TEST URINE, POC: NEGATIVE

## 2025-07-17 PROCEDURE — 96368 THER/DIAG CONCURRENT INF: CPT | Mod: INF

## 2025-07-17 PROCEDURE — 81025 URINE PREGNANCY TEST: CPT

## 2025-07-17 PROCEDURE — 96375 TX/PRO/DX INJ NEW DRUG ADDON: CPT | Mod: INF

## 2025-07-17 PROCEDURE — 2500000004 HC RX 250 GENERAL PHARMACY W/ HCPCS (ALT 636 FOR OP/ED): Performed by: NURSE PRACTITIONER

## 2025-07-17 PROCEDURE — 96365 THER/PROPH/DIAG IV INF INIT: CPT | Mod: INF

## 2025-07-17 RX ORDER — METOPROLOL TARTRATE 25 MG/1
25 TABLET, FILM COATED ORAL ONCE
OUTPATIENT
Start: 2025-08-07 | End: 2025-08-07

## 2025-07-17 RX ORDER — ALBUTEROL SULFATE 0.83 MG/ML
3 SOLUTION RESPIRATORY (INHALATION) AS NEEDED
OUTPATIENT
Start: 2025-08-07

## 2025-07-17 RX ORDER — KETOROLAC TROMETHAMINE 30 MG/ML
30 INJECTION, SOLUTION INTRAMUSCULAR; INTRAVENOUS ONCE
Status: COMPLETED | OUTPATIENT
Start: 2025-07-17 | End: 2025-07-17

## 2025-07-17 RX ORDER — DIPHENHYDRAMINE HYDROCHLORIDE 50 MG/ML
50 INJECTION, SOLUTION INTRAMUSCULAR; INTRAVENOUS AS NEEDED
OUTPATIENT
Start: 2025-08-07

## 2025-07-17 RX ORDER — NITROGLYCERIN 0.4 MG/1
0.4 TABLET SUBLINGUAL ONCE
OUTPATIENT
Start: 2025-08-07 | End: 2025-08-07

## 2025-07-17 RX ORDER — EPINEPHRINE 1 MG/ML
0.3 INJECTION, SOLUTION, CONCENTRATE INTRAVENOUS EVERY 5 MIN PRN
OUTPATIENT
Start: 2025-08-07

## 2025-07-17 RX ORDER — FAMOTIDINE 10 MG/ML
20 INJECTION, SOLUTION INTRAVENOUS ONCE AS NEEDED
OUTPATIENT
Start: 2025-08-07

## 2025-07-17 RX ORDER — ONDANSETRON HYDROCHLORIDE 2 MG/ML
4 INJECTION, SOLUTION INTRAVENOUS ONCE
OUTPATIENT
Start: 2025-08-07 | End: 2025-08-07

## 2025-07-17 RX ORDER — KETOROLAC TROMETHAMINE 30 MG/ML
30 INJECTION, SOLUTION INTRAMUSCULAR; INTRAVENOUS ONCE
OUTPATIENT
Start: 2025-08-07 | End: 2025-08-07

## 2025-07-17 RX ADMIN — Medication: at 10:20

## 2025-07-17 RX ADMIN — KETOROLAC TROMETHAMINE 30 MG: 30 INJECTION, SOLUTION INTRAMUSCULAR at 10:20

## 2025-07-17 RX ADMIN — PROPOFOL 100 MG: 10 INJECTION, EMULSION INTRAVENOUS at 10:20

## 2025-07-17 ASSESSMENT — ENCOUNTER SYMPTOMS
LOSS OF SENSATION IN FEET: 1
DEPRESSION: 1
OCCASIONAL FEELINGS OF UNSTEADINESS: 1

## 2025-07-17 ASSESSMENT — PAIN SCALES - GENERAL
PAINLEVEL_OUTOF10: 7
PAINLEVEL_OUTOF10: 0 - NO PAIN

## 2025-07-17 ASSESSMENT — PAIN - FUNCTIONAL ASSESSMENT
PAIN_FUNCTIONAL_ASSESSMENT: 0-10
PAIN_FUNCTIONAL_ASSESSMENT: 0-10

## 2025-07-17 NOTE — PATIENT INSTRUCTIONS
Today :We administered (ketamine 30 mg, lidocaine (Xylocaine) 150 mg in sodium chloride 0.9% 510.5 mL IV), propofol (Diprivan), and ketorolac.     For:   1. Complex regional pain syndrome type 1 of right lower extremity         Your next appointment is due in:  3 weeks        Please read the  Medication Guide that was given to you and reviewed during todays visit.     (Tell all doctors including dentists that you are taking this medication)     Go to the emergency room or call 911 if:  -You have signs of allergic reaction:   -Rash, hives, itching.   -Swollen, blistered, peeling skin.   -Swelling of face, lips, mouth, tongue or throat.   -Tightness of chest, trouble breathing, swallowing or talking     Call your doctor:  - If IV / injection site gets red, warm, swollen, itchy or leaks fluid or pus.     (Leave dressing on your IV site for at least 2 hours and keep area clean and dry  - If you get sick or have symptoms of infection or are not feeling well for any reason.    (Wash your hands often, stay away from people who are sick)  - If you have side effects from your medication that do not go away or are bothersome.     (Refer to the teaching your nurse gave you for side effects to call your doctor about)    - Common side effects may include:  stuffy nose, headache, feeling tired, muscle aches, upset stomach  - Before receiving any vaccines     - Call [facility] at   If:  - You get sick, are on antibiotics, have had a recent vaccine, have surgery or dental work and your doctor wants your visit rescheduled.  - You need to cancel and reschedule your visit for any reason. Call at least 2 days before your visit if you need to cancel.   - Your insurance changes before your next visit.    (We will need to get approval from your new insurance. This can take up to two weeks.)     [facility] is opened Monday thru Friday. We are closed on weekends and holidays.   Voice mail will take  your call if the center is closed. If you leave a message please allow 24 hours for a call back during weekdays. If you leave a message on a weekend/holiday, we will call you back the next business day.    A pharmacist is available Monday - Friday from 8:30AM to 3:30PM to help answer any questions you may have about your prescriptions(s). Please call pharmacy at:    [facility]: [phone]  [facility]: [phone]  [facility]: [phone]              [facility]      Pain Infusion Aftercare Instructions      1. It is normal to feel sedated, tired and low in energy after a pain infusion. DO NOT DRIVE, OPERATE ANY MACHINERY, OR MAKE ANY IMPORTANT DECISIONS FOR AT LEAST 24 HOURS AFTER THE INFUSION.     2. Call the pain center at [phone] with any problems, questions, or concerns.     3. Eat light after the infusion. If you feel queasy or sick to your stomach, laying down with your eyes closed may help. When you resume eating start with something mild like clear liquids, yogurt, applesauce, crackers, etc… Gradually advance to a regular diet.     4. Do not leave your house alone the evening of your pain infusion.     5. No alcohol or sedative medications, such as sleeping pills, for 24 hours after your pain infusion.     6. Resume all other prescribed medications unless directed otherwise by you physician.     7. If you have any medical emergencies, call 911 or go directly to the closest emergency room.    Patient Instructions  IV Infusion   [facility]      1. A responsible adult must stay with you during your infusion and drive you home. If you do not have a responsible adult with transportation home, your appointment will be rescheduled. Patients must still have a responsible adult if they use [employer]ides[employer], Uber, or Lyft. Uber, Rides[employer], or Lyft drivers do not qualify.   2. On the day of your infusion we ask that you please drink clear  liquids until your appointment.  You should NOT eat food 4 hours before your infusion.    3. Take all medications as prescribed before your infusion. Do not withhold blood pressure medication.   4. Patients who use a CPAP or BIPAP should bring it to the infusion appointment.   5. Children under 16 will not be permitted in the infusion clinic. Please do not bring young children or pets. For patients who must bring a service animal, paperwork will be required. NO EXCEPTIONS.  6.  All Women will be required to take a pregnancy test prior to the infusion unless they are above 55 years of age or have had a hysterectomy.   7. Patients who cancel their infusion should call the Infusion line at [phone] as soon as possible. We would appreciate a 48-hour notice. Please be on time for the appt. Patients who are more than 15 mins late will have to cancel and reschedule. Infusion appt times are minimal. Patients who do not show, cancel, or reschedule an appointment may have a long wait until we can get them back on the schedule.   8. We make every effort to schedule your infusions based on your physician's recommendations. However, factors will affect our infusion schedule and availability. We appreciate your understanding.  9. Appointments will only be scheduled for two appointments in the future.   10. No eating, drinking, or chewing gum during the infusion.   11. If you miss or cancel your infusion appointment it is YOUR responsibility to call us and reschedule.  Please call [phone] or [phone] to reschedule or cancel appointment

## 2025-07-17 NOTE — PROGRESS NOTES
S: Patient here for 1st opioid sparing pain infusion.     Purpose of pain infusion meds explained along with potential side effects.  Patient verbalized understanding.    B: Pain Issues are 7/10 pain in the right knee and right ankle. Is Patient breast feeding: no    Is Patient receiving any other form Ketamine from any other facility/ outside clinic ?: no    A: Patient currently has pain described on flow sheet documentation. Designated  is patient's daughter Misti @[phone]. They are taking a [id number]. Patient last ate solid food 10 hours ago, and had liquid 1 hour ago.    R: Plan; Obtain IV access, do patient risk assessment, and start opioid sparing infusion as ordered. Monitoring for S/S of adverse reactions.    Post infusion teaching provided. Patient verbalized understanding. VSS, Patient states pain is 0/10. Will assist patient to waiting car via wheelchair.

## 2025-08-07 ENCOUNTER — APPOINTMENT (OUTPATIENT)
Dept: INFUSION THERAPY | Facility: CLINIC | Age: 35
End: 2025-08-07
Payer: COMMERCIAL

## 2025-08-26 DIAGNOSIS — G90.521 COMPLEX REGIONAL PAIN SYNDROME TYPE 1 OF RIGHT LOWER EXTREMITY: Primary | ICD-10-CM

## 2025-08-26 RX ORDER — KETOROLAC TROMETHAMINE 30 MG/ML
30 INJECTION, SOLUTION INTRAMUSCULAR; INTRAVENOUS ONCE
Status: CANCELLED | OUTPATIENT
Start: 2025-08-28 | End: 2025-08-28

## 2025-08-28 ENCOUNTER — INFUSION (OUTPATIENT)
Dept: INFUSION THERAPY | Facility: CLINIC | Age: 35
End: 2025-08-28
Payer: COMMERCIAL

## 2025-08-28 VITALS
TEMPERATURE: 97.3 F | HEART RATE: 72 BPM | SYSTOLIC BLOOD PRESSURE: 108 MMHG | RESPIRATION RATE: 16 BRPM | OXYGEN SATURATION: 99 % | DIASTOLIC BLOOD PRESSURE: 53 MMHG

## 2025-08-28 DIAGNOSIS — G90.521 COMPLEX REGIONAL PAIN SYNDROME TYPE 1 OF RIGHT LOWER EXTREMITY: ICD-10-CM

## 2025-08-28 LAB — PREGNANCY TEST URINE, POC: NEGATIVE

## 2025-08-28 PROCEDURE — 96368 THER/DIAG CONCURRENT INF: CPT | Mod: INF

## 2025-08-28 PROCEDURE — 2500000004 HC RX 250 GENERAL PHARMACY W/ HCPCS (ALT 636 FOR OP/ED): Performed by: NURSE PRACTITIONER

## 2025-08-28 PROCEDURE — 81025 URINE PREGNANCY TEST: CPT

## 2025-08-28 PROCEDURE — 96365 THER/PROPH/DIAG IV INF INIT: CPT | Mod: INF

## 2025-08-28 PROCEDURE — 96375 TX/PRO/DX INJ NEW DRUG ADDON: CPT | Mod: INF

## 2025-08-28 RX ORDER — METOPROLOL TARTRATE 25 MG/1
25 TABLET, FILM COATED ORAL ONCE
OUTPATIENT
Start: 2025-09-18 | End: 2025-09-18

## 2025-08-28 RX ORDER — DIPHENHYDRAMINE HYDROCHLORIDE 50 MG/ML
50 INJECTION, SOLUTION INTRAMUSCULAR; INTRAVENOUS AS NEEDED
OUTPATIENT
Start: 2025-09-18

## 2025-08-28 RX ORDER — FAMOTIDINE 10 MG/ML
20 INJECTION, SOLUTION INTRAVENOUS ONCE AS NEEDED
OUTPATIENT
Start: 2025-09-18

## 2025-08-28 RX ORDER — KETOROLAC TROMETHAMINE 30 MG/ML
30 INJECTION, SOLUTION INTRAMUSCULAR; INTRAVENOUS ONCE
OUTPATIENT
Start: 2025-09-18 | End: 2025-09-18

## 2025-08-28 RX ORDER — NITROGLYCERIN 0.4 MG/1
0.4 TABLET SUBLINGUAL ONCE
OUTPATIENT
Start: 2025-09-18 | End: 2025-09-18

## 2025-08-28 RX ORDER — KETOROLAC TROMETHAMINE 30 MG/ML
30 INJECTION, SOLUTION INTRAMUSCULAR; INTRAVENOUS ONCE
Status: COMPLETED | OUTPATIENT
Start: 2025-08-28 | End: 2025-08-28

## 2025-08-28 RX ORDER — ALBUTEROL SULFATE 0.83 MG/ML
3 SOLUTION RESPIRATORY (INHALATION) AS NEEDED
OUTPATIENT
Start: 2025-09-18

## 2025-08-28 RX ORDER — EPINEPHRINE 1 MG/ML
0.3 INJECTION, SOLUTION, CONCENTRATE INTRAVENOUS EVERY 5 MIN PRN
OUTPATIENT
Start: 2025-09-18

## 2025-08-28 RX ORDER — ONDANSETRON HYDROCHLORIDE 2 MG/ML
4 INJECTION, SOLUTION INTRAVENOUS ONCE
OUTPATIENT
Start: 2025-09-18 | End: 2025-09-18

## 2025-08-28 RX ADMIN — KETOROLAC TROMETHAMINE 30 MG: 30 INJECTION, SOLUTION INTRAMUSCULAR at 11:03

## 2025-08-28 RX ADMIN — Medication: at 11:03

## 2025-08-28 RX ADMIN — PROPOFOL 100 MG: 10 INJECTION, EMULSION INTRAVENOUS at 11:03

## 2025-08-28 ASSESSMENT — PAIN SCALES - GENERAL
PAINLEVEL_OUTOF10: 7
PAINLEVEL_OUTOF10: 0 - NO PAIN
PAINLEVEL_OUTOF10: 0 - NO PAIN

## 2025-08-28 ASSESSMENT — ENCOUNTER SYMPTOMS
LOSS OF SENSATION IN FEET: 1
OCCASIONAL FEELINGS OF UNSTEADINESS: 1
DEPRESSION: 0

## 2025-08-28 ASSESSMENT — PAIN - FUNCTIONAL ASSESSMENT: PAIN_FUNCTIONAL_ASSESSMENT: 0-10

## (undated) DEVICE — BANDAGE, ELASTIC, ACE, ACE, DOUBLE LENGTH, 6 X 550 IN, LF

## (undated) DEVICE — APPLICATOR, PREP, CHLORAPREP, W/ORANGE TINT, 10.5ML

## (undated) DEVICE — DRAPE, SHEET, U, W/ADHESIVE STRIP, IMPERVIOUS, 60 X 70 IN, DISPOSABLE, LF, STERILE

## (undated) DEVICE — BANDAGE, COFLEX, 4 X 5 YDS, TAN, STERILE, LF

## (undated) DEVICE — TRAY, MINOR, SINGLE BASIN, STERILE

## (undated) DEVICE — CATHETER, THORACIC, STRAIGHT, ADULT, 40FR, PVC

## (undated) DEVICE — BANDAGE, ELASTIC, MATRIX, SELF-CLOSURE, 4 IN X 5 YD, LF

## (undated) DEVICE — DRAPE COVER, C ARM, FLOUROSCAN IMAGING SYS

## (undated) DEVICE — BANDAGE, GAUZE, CONFORMING, KERLIX, 6 PLY, 4.5 IN X 4.1 YD

## (undated) DEVICE — COVER, C-ARM W/CLIPS, OEC GE

## (undated) DEVICE — WIRE, K 3 X 285 FIXAT COATED

## (undated) DEVICE — MANIFOLD, 4 PORT NEPTUNE STANDARD

## (undated) DEVICE — DRESSING, NON-ADHERENT, OIL EMULSION, CURITY, 3 X 8 IN, STERILE

## (undated) DEVICE — PROTECTOR, NERVE, ULNAR, PINK

## (undated) DEVICE — Device

## (undated) DEVICE — BANDAGE, ELASTIC, SELF-CLOSE, 6 IN, HONEYCOMB, STERILE

## (undated) DEVICE — SUTURE, VICRYL, 2-0, 27 IN, FSL, UNDYED

## (undated) DEVICE — ELECTRODE, ELECTROSURGICAL, BLADE, STANDARD, 2.75 IN

## (undated) DEVICE — SUTURE, VICRYL, 1, 27 IN, CT-1, VIOLET

## (undated) DEVICE — SLEEVE, INSERT ELAST NAIL SPI 8-11

## (undated) DEVICE — DRILL BIT, AO, 2.0 X 135MM, SCALED

## (undated) DEVICE — DRAPE, SHEET, THREE QUARTER, FAN FOLD, 57 X 77 IN

## (undated) DEVICE — COVER, CART, 45 X 27 X 48 IN, CLEAR

## (undated) DEVICE — COVER, BACK TABLE, 65 X 90, HVY REINFORCED

## (undated) DEVICE — STAPLER, SKIN PROXIMATE, 35 WIDE

## (undated) DEVICE — IRRIGATION SET, Y, LARGE BORE